# Patient Record
Sex: FEMALE | Race: WHITE | NOT HISPANIC OR LATINO | Employment: OTHER | URBAN - METROPOLITAN AREA
[De-identification: names, ages, dates, MRNs, and addresses within clinical notes are randomized per-mention and may not be internally consistent; named-entity substitution may affect disease eponyms.]

---

## 2017-02-13 ENCOUNTER — OFFICE VISIT (OUTPATIENT)
Dept: INTERNAL MEDICINE | Facility: CLINIC | Age: 62
End: 2017-02-13

## 2017-02-13 VITALS
OXYGEN SATURATION: 98 % | HEIGHT: 62 IN | DIASTOLIC BLOOD PRESSURE: 92 MMHG | RESPIRATION RATE: 16 BRPM | SYSTOLIC BLOOD PRESSURE: 144 MMHG | BODY MASS INDEX: 24.4 KG/M2 | WEIGHT: 132.6 LBS | HEART RATE: 91 BPM

## 2017-02-13 DIAGNOSIS — N95.1 MENOPAUSAL SYMPTOM: ICD-10-CM

## 2017-02-13 DIAGNOSIS — I10 ESSENTIAL HYPERTENSION: ICD-10-CM

## 2017-02-13 DIAGNOSIS — E78.2 MIXED HYPERLIPIDEMIA: Primary | ICD-10-CM

## 2017-02-13 DIAGNOSIS — Z12.31 SCREENING MAMMOGRAM, ENCOUNTER FOR: ICD-10-CM

## 2017-02-13 DIAGNOSIS — E03.9 ACQUIRED HYPOTHYROIDISM: ICD-10-CM

## 2017-02-13 PROCEDURE — 99204 OFFICE O/P NEW MOD 45 MIN: CPT | Performed by: INTERNAL MEDICINE

## 2017-02-13 NOTE — PROGRESS NOTES
Subjective   Tierney Rolon is a 61 y.o. female.     History of Present Illness   60 yo female with pmhx HTN, HL, hypothyroidism.     Thyroid replacement is relatively new, will research further in records.  She has lost weight with her retireemnt.  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     Lost some weight after assisted.  Her stress is much better controlled  No longer with any reflux sysmptoms.    Her blood pressure runs 132/76 brenda at home - She is higher today than normal.    Still taking estradiol patch, twice weekly  Easy transition.  No significant withdrawal symptoms reported.    She has no new complaints. No headache or CP despite her elevated BP today which is inconsistent with her home readings.       The following portions of the patient's history were reviewed and updated as appropriate: allergies, current medications, past family history, past medical history, past social history, past surgical history and problem list.    Review of Systems   HENT: Negative.    Respiratory: Negative.         Known LBBB   Cardiovascular: Negative.         Stress testing negative in past     Gastrointestinal: Negative.    Genitourinary: Negative.    Musculoskeletal: Negative.    Neurological: Negative.    All other systems reviewed and are negative.      Objective   Physical Exam   Constitutional: She is oriented to person, place, and time. She appears well-developed.   HENT:   Head: Normocephalic and atraumatic.   Right Ear: External ear normal.   Eyes: EOM are normal. Pupils are equal, round, and reactive to light. Right eye exhibits no discharge. Left eye exhibits no discharge.   Neck: Neck supple. No thyromegaly present.   Cardiovascular: Normal rate and regular rhythm.    No murmur heard.  Pulmonary/Chest: Effort normal and breath sounds normal.   Abdominal: Soft.   Neurological: She is alert and oriented to person, place, and time.   Skin: Skin is warm and dry.   Psychiatric: She has a  normal mood and affect. Her behavior is normal.   Nursing note and vitals reviewed.      Assessment/Plan   Tierney was seen today for hypertension and hyperlipidemia.    Diagnoses and all orders for this visit:    Mixed hyperlipidemia  -     Lipid Panel With / Chol / HDL Ratio  -     Comprehensive Metabolic Panel    Essential hypertension  -     CBC & AUTO Differential    Acquired hypothyroidism  -     T4, Free  -     T3, Free  -     TSH  -     CBC & AUTO Differential    Menopausal symptom    Screening mammogram, encounter for  -     Mammo Diagnostic Bilateral With CAD        HTN - check at home once weekly - would journal at least once per week and bring to future appointment.  Goal <130/80  Continue low sodium diet  Remember alcohol can increase blood pressure  Daily exercise very important    HL - no side effects on her lipitor  Check fasting labs today, call with result    Hypothyroidism - check TSh and Free T3 and T4    Osteopenia with improvement last scan, will reorder to see if further treatment necessary, repeat every 2 years  Vitamins appropriate, includes biotin and D and fish oil     Mammogram neg 12/2/15 - repeat due and ordered.  No further PAP necessary  Colonoscopy up to date, per Dr. Reich  Tdap today  Pneumovax in 2015, due again in 2020, prevnar due at 67  Zoster (shingles at 62)  FU 6 months for CPE

## 2017-02-13 NOTE — PATIENT INSTRUCTIONS
Assessment/Plan   Tierney was seen today for hypertension and hyperlipidemia.    Diagnoses and all orders for this visit:    Mixed hyperlipidemia  -     Lipid Panel With / Chol / HDL Ratio  -     Comprehensive Metabolic Panel    Essential hypertension  -     CBC & AUTO Differential    Acquired hypothyroidism  -     T4, Free  -     T3, Free  -     TSH  -     CBC & AUTO Differential    Menopausal symptom    Screening mammogram, encounter for  -     Mammo Diagnostic Bilateral With CAD        HTN - check at home once weekly - would journal at least once per week and bring to future appointment.  Goal <130/80  Continue low sodium diet  Remember alcohol can increase blood pressure  Daily exercise very important    HL - no side effects on her lipitor  Check fasting labs today, call with result    Hypothyroidism - check TSh and Free T3 and T4    Osteopenia with improvement last scan, will reorder to see if further treatment necessary, repeat every 2 years  Vitamins appropriate, includes biotin and D and fish oil     Mammogram neg 12/2/15 - repeat due and ordered.  No further PAP necessary  Colonoscopy up to date, per Dr. Reich  Tdap today  Pneumovax in 2015, due again in 2020, prevnar due at 67  Zoster (shingles at 62)  FU 6 months for CPE

## 2017-02-14 LAB
ALBUMIN SERPL-MCNC: 4.8 G/DL (ref 3.5–5.2)
ALBUMIN/GLOB SERPL: 2.2 G/DL
ALP SERPL-CCNC: 74 U/L (ref 40–129)
ALT SERPL-CCNC: 36 U/L (ref 5–33)
AST SERPL-CCNC: 27 U/L (ref 5–32)
BASOPHILS # BLD AUTO: 0.04 10*3/MM3 (ref 0–0.2)
BASOPHILS NFR BLD AUTO: 0.7 % (ref 0–2)
BILIRUB SERPL-MCNC: 0.8 MG/DL (ref 0.2–1.2)
BUN SERPL-MCNC: 19 MG/DL (ref 6–20)
BUN/CREAT SERPL: 25.7 (ref 7–25)
CALCIUM SERPL-MCNC: 9.7 MG/DL (ref 8.6–10.5)
CHLORIDE SERPL-SCNC: 97 MMOL/L (ref 98–107)
CHOLEST SERPL-MCNC: 217 MG/DL (ref 0–200)
CHOLEST/HDLC SERPL: 3.06 {RATIO}
CO2 SERPL-SCNC: 28.8 MMOL/L (ref 22–29)
CREAT SERPL-MCNC: 0.74 MG/DL (ref 0.57–1)
EOSINOPHIL # BLD AUTO: 0.11 10*3/MM3 (ref 0.1–0.3)
EOSINOPHIL NFR BLD AUTO: 2 % (ref 0–4)
ERYTHROCYTE [DISTWIDTH] IN BLOOD BY AUTOMATED COUNT: 12.5 % (ref 11.5–14.5)
GLOBULIN SER CALC-MCNC: 2.2 GM/DL
GLUCOSE SERPL-MCNC: 99 MG/DL (ref 65–99)
HCT VFR BLD AUTO: 39.2 % (ref 37–47)
HDLC SERPL-MCNC: 71 MG/DL (ref 40–60)
HGB BLD-MCNC: 14.6 G/DL (ref 12–16)
IMM GRANULOCYTES # BLD: 0.01 10*3/MM3 (ref 0–0.03)
IMM GRANULOCYTES NFR BLD: 0.2 % (ref 0–0.5)
LDLC SERPL CALC-MCNC: 111 MG/DL (ref 0–100)
LYMPHOCYTES # BLD AUTO: 1.53 10*3/MM3 (ref 0.6–4.8)
LYMPHOCYTES NFR BLD AUTO: 27.2 % (ref 20–45)
MCH RBC QN AUTO: 35.2 PG (ref 27–31)
MCHC RBC AUTO-ENTMCNC: 37.2 G/DL (ref 31–37)
MCV RBC AUTO: 94.5 FL (ref 81–99)
MONOCYTES # BLD AUTO: 0.3 10*3/MM3 (ref 0–1)
MONOCYTES NFR BLD AUTO: 5.3 % (ref 3–8)
NEUTROPHILS # BLD AUTO: 3.63 10*3/MM3 (ref 1.5–8.3)
NEUTROPHILS NFR BLD AUTO: 64.6 % (ref 45–70)
NRBC BLD AUTO-RTO: 0 /100 WBC (ref 0–0)
PLATELET # BLD AUTO: 222 10*3/MM3 (ref 140–500)
POTASSIUM SERPL-SCNC: 3.5 MMOL/L (ref 3.5–5.2)
PROT SERPL-MCNC: 7 G/DL (ref 6–8.5)
RBC # BLD AUTO: 4.15 10*6/MM3 (ref 4.2–5.4)
SODIUM SERPL-SCNC: 139 MMOL/L (ref 136–145)
T3FREE SERPL-MCNC: 3 PG/ML (ref 2–4.4)
T4 FREE SERPL-MCNC: 1.13 NG/DL (ref 0.93–1.7)
TRIGL SERPL-MCNC: 175 MG/DL (ref 0–150)
TSH SERPL DL<=0.005 MIU/L-ACNC: 3.36 MIU/ML (ref 0.27–4.2)
VLDLC SERPL CALC-MCNC: 35 MG/DL (ref 7–27)
WBC # BLD AUTO: 5.62 10*3/MM3 (ref 4.8–10.8)

## 2017-02-16 ENCOUNTER — TELEPHONE (OUTPATIENT)
Dept: INTERNAL MEDICINE | Facility: CLINIC | Age: 62
End: 2017-02-16

## 2017-02-16 NOTE — TELEPHONE ENCOUNTER
Patient notified.   ----- Message from Manjinder Saldana MD sent at 2/14/2017  8:04 AM EST -----  Cholesterol went from 195-->217.  Her triglycerides are elevated and she needs to avoid any fried food or red meat.  Can take fish oil twice daily with red yeast rice.  He HDL is very good and LDL is only slightly elevated.  NO other prescriptions needed. Thyroid levels are in normal range. Fu 6 months.

## 2017-02-20 DIAGNOSIS — Z12.31 ENCOUNTER FOR SCREENING MAMMOGRAM FOR BREAST CANCER: Primary | ICD-10-CM

## 2017-02-21 ENCOUNTER — HOSPITAL ENCOUNTER (OUTPATIENT)
Dept: MAMMOGRAPHY | Facility: HOSPITAL | Age: 62
Discharge: HOME | End: 2017-02-21
Admitting: INTERNAL MEDICINE

## 2017-02-21 ENCOUNTER — APPOINTMENT (OUTPATIENT)
Dept: BONE DENSITY | Facility: HOSPITAL | Age: 62
End: 2017-02-21

## 2017-02-21 DIAGNOSIS — N95.1 MENOPAUSAL SYMPTOM: ICD-10-CM

## 2017-02-21 PROCEDURE — 77080 DXA BONE DENSITY AXIAL: CPT

## 2017-02-21 PROCEDURE — G0202 SCR MAMMO BI INCL CAD: HCPCS

## 2017-02-22 ENCOUNTER — TELEPHONE (OUTPATIENT)
Dept: INTERNAL MEDICINE | Facility: CLINIC | Age: 62
End: 2017-02-22

## 2017-02-22 NOTE — TELEPHONE ENCOUNTER
Patient has been advised and voiced understanding.  ----- Message from Manjinder Saldana MD sent at 2/22/2017  9:26 AM EST -----  Mammogram normal

## 2017-02-23 ENCOUNTER — TELEPHONE (OUTPATIENT)
Dept: INTERNAL MEDICINE | Facility: CLINIC | Age: 62
End: 2017-02-23

## 2017-02-23 NOTE — TELEPHONE ENCOUNTER
Patient has been advised and voiced understanding.    ----- Message from Manjinder Saldana MD sent at 2/22/2017 12:34 PM EST -----  Normal bone density

## 2017-02-28 DIAGNOSIS — E03.9 ACQUIRED HYPOTHYROIDISM: ICD-10-CM

## 2017-02-28 RX ORDER — ESTRADIOL 0.05 MG/D
1 FILM, EXTENDED RELEASE TRANSDERMAL 2 TIMES WEEKLY
Qty: 24 PATCH | Refills: 3 | Status: SHIPPED | OUTPATIENT
Start: 2017-03-02 | End: 2018-01-18 | Stop reason: SDUPTHER

## 2017-02-28 RX ORDER — HYDROCHLOROTHIAZIDE 25 MG/1
25 TABLET ORAL DAILY
Qty: 90 TABLET | Refills: 3 | Status: SHIPPED | OUTPATIENT
Start: 2017-02-28 | End: 2018-02-05 | Stop reason: SDUPTHER

## 2017-02-28 RX ORDER — LEVOTHYROXINE SODIUM 0.03 MG/1
TABLET ORAL
Qty: 100 TABLET | Refills: 2 | Status: SHIPPED | OUTPATIENT
Start: 2017-02-28 | End: 2017-10-29 | Stop reason: SDUPTHER

## 2017-02-28 RX ORDER — ATORVASTATIN CALCIUM 10 MG/1
10 TABLET, FILM COATED ORAL DAILY
Qty: 90 TABLET | Refills: 3 | Status: SHIPPED | OUTPATIENT
Start: 2017-02-28 | End: 2018-01-08 | Stop reason: SDUPTHER

## 2017-08-07 DIAGNOSIS — Z00.00 HEALTHCARE MAINTENANCE: Primary | ICD-10-CM

## 2017-08-09 LAB
25(OH)D3+25(OH)D2 SERPL-MCNC: 40.7 NG/ML
ALBUMIN SERPL-MCNC: 4.4 G/DL (ref 3.5–5.2)
ALBUMIN/GLOB SERPL: 2.1 G/DL
ALP SERPL-CCNC: 65 U/L (ref 40–129)
ALT SERPL-CCNC: 36 U/L (ref 5–33)
AST SERPL-CCNC: 28 U/L (ref 5–32)
BASOPHILS # BLD AUTO: 0.04 10*3/MM3 (ref 0–0.2)
BASOPHILS NFR BLD AUTO: 0.7 % (ref 0–2)
BILIRUB SERPL-MCNC: 0.8 MG/DL (ref 0.2–1.2)
BUN SERPL-MCNC: 23 MG/DL (ref 6–20)
BUN/CREAT SERPL: 33.3 (ref 7–25)
CALCIUM SERPL-MCNC: 9.4 MG/DL (ref 8.6–10.5)
CHLORIDE SERPL-SCNC: 99 MMOL/L (ref 98–107)
CHOLEST SERPL-MCNC: 183 MG/DL (ref 0–200)
CHOLEST/HDLC SERPL: 2.44 {RATIO}
CO2 SERPL-SCNC: 28.4 MMOL/L (ref 22–29)
CREAT SERPL-MCNC: 0.69 MG/DL (ref 0.57–1)
EOSINOPHIL # BLD AUTO: 0.09 10*3/MM3 (ref 0.1–0.3)
EOSINOPHIL NFR BLD AUTO: 1.5 % (ref 0–4)
ERYTHROCYTE [DISTWIDTH] IN BLOOD BY AUTOMATED COUNT: 12 % (ref 11.5–14.5)
GLOBULIN SER CALC-MCNC: 2.1 GM/DL
GLUCOSE SERPL-MCNC: 94 MG/DL (ref 65–99)
HBA1C MFR BLD: 5 % (ref 4.8–5.6)
HCT VFR BLD AUTO: 42.5 % (ref 37–47)
HDLC SERPL-MCNC: 75 MG/DL (ref 40–60)
HGB BLD-MCNC: 14.6 G/DL (ref 12–16)
IMM GRANULOCYTES # BLD: 0.02 10*3/MM3 (ref 0–0.03)
IMM GRANULOCYTES NFR BLD: 0.3 % (ref 0–0.5)
LDLC SERPL CALC-MCNC: 94 MG/DL (ref 0–100)
LYMPHOCYTES # BLD AUTO: 1.66 10*3/MM3 (ref 0.6–4.8)
LYMPHOCYTES NFR BLD AUTO: 28.3 % (ref 20–45)
MCH RBC QN AUTO: 31.6 PG (ref 27–31)
MCHC RBC AUTO-ENTMCNC: 34.4 G/DL (ref 31–37)
MCV RBC AUTO: 92 FL (ref 81–99)
MONOCYTES # BLD AUTO: 0.32 10*3/MM3 (ref 0–1)
MONOCYTES NFR BLD AUTO: 5.5 % (ref 3–8)
NEUTROPHILS # BLD AUTO: 3.74 10*3/MM3 (ref 1.5–8.3)
NEUTROPHILS NFR BLD AUTO: 63.7 % (ref 45–70)
NRBC BLD AUTO-RTO: 0 /100 WBC (ref 0–0)
PLATELET # BLD AUTO: 226 10*3/MM3 (ref 140–500)
POTASSIUM SERPL-SCNC: 4 MMOL/L (ref 3.5–5.2)
PROT SERPL-MCNC: 6.5 G/DL (ref 6–8.5)
RBC # BLD AUTO: 4.62 10*6/MM3 (ref 4.2–5.4)
SODIUM SERPL-SCNC: 138 MMOL/L (ref 136–145)
TRIGL SERPL-MCNC: 68 MG/DL (ref 0–150)
TSH SERPL DL<=0.005 MIU/L-ACNC: 2.21 MIU/ML (ref 0.27–4.2)
VLDLC SERPL CALC-MCNC: 13.6 MG/DL (ref 7–27)
WBC # BLD AUTO: 5.87 10*3/MM3 (ref 4.8–10.8)

## 2017-08-12 ENCOUNTER — RESULTS ENCOUNTER (OUTPATIENT)
Dept: INTERNAL MEDICINE | Facility: CLINIC | Age: 62
End: 2017-08-12

## 2017-08-12 DIAGNOSIS — Z00.00 HEALTHCARE MAINTENANCE: ICD-10-CM

## 2017-08-15 ENCOUNTER — OFFICE VISIT (OUTPATIENT)
Dept: INTERNAL MEDICINE | Facility: CLINIC | Age: 62
End: 2017-08-15

## 2017-08-15 VITALS
OXYGEN SATURATION: 97 % | SYSTOLIC BLOOD PRESSURE: 132 MMHG | BODY MASS INDEX: 24.48 KG/M2 | RESPIRATION RATE: 16 BRPM | WEIGHT: 133 LBS | HEART RATE: 71 BPM | DIASTOLIC BLOOD PRESSURE: 80 MMHG | HEIGHT: 62 IN

## 2017-08-15 DIAGNOSIS — Z00.00 ROUTINE ADULT HEALTH MAINTENANCE: ICD-10-CM

## 2017-08-15 DIAGNOSIS — Z78.0 MENOPAUSE: Primary | ICD-10-CM

## 2017-08-15 DIAGNOSIS — G89.29 CHRONIC BILATERAL LOW BACK PAIN WITHOUT SCIATICA: ICD-10-CM

## 2017-08-15 DIAGNOSIS — M54.50 CHRONIC BILATERAL LOW BACK PAIN WITHOUT SCIATICA: ICD-10-CM

## 2017-08-15 LAB
BILIRUB BLD-MCNC: NEGATIVE MG/DL
CLARITY, POC: CLEAR
COLOR UR: YELLOW
GLUCOSE UR STRIP-MCNC: NEGATIVE MG/DL
KETONES UR QL: NEGATIVE
LEUKOCYTE EST, POC: NEGATIVE
NITRITE UR-MCNC: NEGATIVE MG/ML
PH UR: 6.5 [PH] (ref 5–8)
PROT UR STRIP-MCNC: NEGATIVE MG/DL
RBC # UR STRIP: NEGATIVE /UL
SP GR UR: 1.02 (ref 1–1.03)
UROBILINOGEN UR QL: NORMAL

## 2017-08-15 PROCEDURE — 93000 ELECTROCARDIOGRAM COMPLETE: CPT | Performed by: INTERNAL MEDICINE

## 2017-08-15 PROCEDURE — 99396 PREV VISIT EST AGE 40-64: CPT | Performed by: INTERNAL MEDICINE

## 2017-08-15 PROCEDURE — 81003 URINALYSIS AUTO W/O SCOPE: CPT | Performed by: INTERNAL MEDICINE

## 2017-08-15 NOTE — PROGRESS NOTES
Subjective   Tierney Rolon is a 61 y.o. female.     History of Present Illness   60 yo female with pmhx hypothyroidism, HL on statin, LBBB  Here for wellness. Does have some new low back pain and some grinding noises in neck and low back. No radicular pain. 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 SHe is not taking pain medication (NSAIDS or otherwise) but is wanting to know of any prevention she can do.  She does a lot of lifting of feed etc.    Occasionally has some numbness in one toe, bilatearl sensation of hand swelling.  Her lower back is hurting on occasion, using heat for relief.     Libido is a new issue.  She really has no desire. No dyspareunia, but feels like she is disappointing her .   The following portions of the patient's history were reviewed and updated as appropriate: allergies, current medications, past family history, past medical history, past social history, past surgical history and problem list.    Review of Systems   Constitutional: Negative.    HENT: Negative.    Respiratory: Negative.    Cardiovascular: Negative.    Genitourinary: Negative for hematuria, menstrual problem, pelvic pain and urgency.        Poor libido   Musculoskeletal: Positive for back pain.   Psychiatric/Behavioral: Negative.    All other systems reviewed and are negative.      Objective   Physical Exam    ECG 12 Lead  Date/Time: 8/15/2017 12:23 PM  Performed by: EMILY KELSEY  Authorized by: EMILY KELSEY   Interpreted by ED physician: compared to previous unchanged.  Conduction: left bundle branch block          Labs reviewed with her. CHol much better 8/16, TG now in normal range. D 41.  TSh therapeutic.  Assessment/Plan   Tierney was seen today for annual exam.    Diagnoses and all orders for this visit:    Menopause  -     Testosterone, Free, Total  -     DHEA  -     Estradiol, Free Serum  -     FSH & LH    Routine adult health maintenance    Chronic bilateral low back pain without  sciatica    Recommend home PT, [email or web address]  Motrin as needed    Refer to [facility] Valdes for consult pending lab outcome for topical hormone options to help with libido  Tetanus up to date  Flu shot in fall  Make sure to include calcium and vitamin D in diet  Great job with cholesterol    FU as needed, no longer tahn 12months.

## 2017-08-15 NOTE — PATIENT INSTRUCTIONS
Assessment/Plan   Tierney was seen today for annual exam.    Diagnoses and all orders for this visit:    Menopause  -     Testosterone, Free, Total  -     DHEA  -     Estradiol, Free Serum  -     FSH & LH    Routine adult health maintenance    Chronic bilateral low back pain without sciatica    Recommend home PT, [email or web address]  Motrin as needed    Refer to [facility] for consult pending lab outcome for topical hormone options to help with libido  Tetanus up to date  Flu shot in fall  Make sure to include calcium and vitamin D in diet  Great job with cholesterol

## 2017-09-21 ENCOUNTER — TELEPHONE (OUTPATIENT)
Dept: INTERNAL MEDICINE | Facility: CLINIC | Age: 62
End: 2017-09-21

## 2017-09-21 NOTE — TELEPHONE ENCOUNTER
"LVM with patient to let her know to contact [facility] on [facility] for hormone replacement therapy.    --- Message from Nicky Monge MA sent at 9/18/2017  1:56 PM EDT -----  Regarding: FW: REFERRAL?      ----- Message -----     From: Mari Thomson     Sent: 9/18/2017  12:19 PM       To: [facility]  Subject: REFERRAL?                                        LOW    Patient was seen by provider on Aug 15th.  Says she was going to be referred to a \"pharmacist\".  (Stormy Valdes)    Says she hasn't heard anything from our office nor the pharmacist.    Pt # [phone]    Told patient Dr. Saldana wasn't in the office today, but maybe Rigoberto could find something in her chart.    "

## 2017-09-29 LAB
DHEA SERPL-MCNC: 187 NG/DL (ref 31–701)
ESTRADIOL FREE MFR SERPL: 1.4 %
ESTRADIOL FREE SERPL-MCNC: 0.57 PG/ML
ESTRADIOL SERPL HS-MCNC: 41 PG/ML
FSH SERPL-ACNC: 73.7 MIU/ML
LH SERPL-ACNC: 43.2 MIU/ML
TESTOST FREE SERPL-MCNC: 1.2 PG/ML (ref 0–4.2)
TESTOST SERPL-MCNC: 5 NG/DL (ref 3–41)

## 2017-10-29 DIAGNOSIS — E03.9 ACQUIRED HYPOTHYROIDISM: ICD-10-CM

## 2017-10-30 RX ORDER — LEVOTHYROXINE SODIUM 0.03 MG/1
TABLET ORAL
Qty: 100 TABLET | Refills: 2 | Status: SHIPPED | OUTPATIENT
Start: 2017-10-30 | End: 2018-07-17 | Stop reason: SDUPTHER

## 2018-01-08 RX ORDER — ATORVASTATIN CALCIUM 10 MG/1
TABLET, FILM COATED ORAL
Qty: 90 TABLET | Refills: 3 | Status: SHIPPED | OUTPATIENT
Start: 2018-01-08 | End: 2019-01-03 | Stop reason: SDUPTHER

## 2018-01-18 RX ORDER — ESTRADIOL 0.05 MG/D
FILM, EXTENDED RELEASE TRANSDERMAL
Qty: 24 PATCH | Refills: 3 | Status: SHIPPED | OUTPATIENT
Start: 2018-01-18 | End: 2018-02-15 | Stop reason: DRUGHIGH

## 2018-02-05 RX ORDER — HYDROCHLOROTHIAZIDE 25 MG/1
TABLET ORAL
Qty: 90 TABLET | Refills: 3 | Status: SHIPPED | OUTPATIENT
Start: 2018-02-05 | End: 2019-01-31 | Stop reason: SDUPTHER

## 2018-02-06 ENCOUNTER — LAB (OUTPATIENT)
Dept: INTERNAL MEDICINE | Facility: CLINIC | Age: 63
End: 2018-02-06

## 2018-02-06 DIAGNOSIS — E78.5 HYPERLIPIDEMIA, UNSPECIFIED HYPERLIPIDEMIA TYPE: ICD-10-CM

## 2018-02-06 DIAGNOSIS — Z79.899 HIGH RISK MEDICATION USE: ICD-10-CM

## 2018-02-06 DIAGNOSIS — E03.9 HYPOTHYROIDISM, UNSPECIFIED TYPE: ICD-10-CM

## 2018-02-06 DIAGNOSIS — I10 HYPERTENSION, ESSENTIAL: ICD-10-CM

## 2018-02-06 DIAGNOSIS — E78.5 HYPERLIPIDEMIA, UNSPECIFIED HYPERLIPIDEMIA TYPE: Primary | ICD-10-CM

## 2018-02-06 LAB
ALBUMIN SERPL-MCNC: 4.7 G/DL (ref 3.5–5.2)
ALBUMIN/GLOB SERPL: 2.2 G/DL
ALP SERPL-CCNC: 69 U/L (ref 40–129)
ALT SERPL-CCNC: 22 U/L (ref 5–33)
AST SERPL-CCNC: 19 U/L (ref 5–32)
BASOPHILS # BLD AUTO: 0.05 10*3/MM3 (ref 0–0.2)
BASOPHILS NFR BLD AUTO: 0.8 % (ref 0–2)
BILIRUB SERPL-MCNC: 0.7 MG/DL (ref 0.2–1.2)
BUN SERPL-MCNC: 16 MG/DL (ref 8–23)
BUN/CREAT SERPL: 21.1 (ref 7–25)
CALCIUM SERPL-MCNC: 9.3 MG/DL (ref 8.8–10.5)
CHLORIDE SERPL-SCNC: 98 MMOL/L (ref 98–107)
CHOLEST SERPL-MCNC: 213 MG/DL (ref 0–200)
CHOLEST/HDLC SERPL: 2.84 {RATIO}
CO2 SERPL-SCNC: 30.7 MMOL/L (ref 22–29)
CREAT SERPL-MCNC: 0.76 MG/DL (ref 0.57–1)
EOSINOPHIL # BLD AUTO: 0.16 10*3/MM3 (ref 0.1–0.3)
EOSINOPHIL NFR BLD AUTO: 2.7 % (ref 0–4)
ERYTHROCYTE [DISTWIDTH] IN BLOOD BY AUTOMATED COUNT: 12.1 % (ref 11.5–14.5)
GFR SERPLBLD CREATININE-BSD FMLA CKD-EPI: 78 ML/MIN/1.73
GFR SERPLBLD CREATININE-BSD FMLA CKD-EPI: 94 ML/MIN/1.73
GLOBULIN SER CALC-MCNC: 2.1 GM/DL
GLUCOSE SERPL-MCNC: 95 MG/DL (ref 65–99)
HCT VFR BLD AUTO: 43.2 % (ref 37–47)
HDLC SERPL-MCNC: 75 MG/DL (ref 40–60)
HGB BLD-MCNC: 15 G/DL (ref 12–16)
IMM GRANULOCYTES # BLD: 0.01 10*3/MM3 (ref 0–0.03)
IMM GRANULOCYTES NFR BLD: 0.2 % (ref 0–0.5)
LDLC SERPL CALC-MCNC: 105 MG/DL (ref 0–100)
LYMPHOCYTES # BLD AUTO: 1.78 10*3/MM3 (ref 0.6–4.8)
LYMPHOCYTES NFR BLD AUTO: 30 % (ref 20–45)
MCH RBC QN AUTO: 31.9 PG (ref 27–31)
MCHC RBC AUTO-ENTMCNC: 34.7 G/DL (ref 31–37)
MCV RBC AUTO: 91.9 FL (ref 81–99)
MONOCYTES # BLD AUTO: 0.39 10*3/MM3 (ref 0–1)
MONOCYTES NFR BLD AUTO: 6.6 % (ref 3–8)
NEUTROPHILS # BLD AUTO: 3.55 10*3/MM3 (ref 1.5–8.3)
NEUTROPHILS NFR BLD AUTO: 59.7 % (ref 45–70)
NRBC BLD AUTO-RTO: 0 /100 WBC (ref 0–0)
PLATELET # BLD AUTO: 218 10*3/MM3 (ref 140–500)
POTASSIUM SERPL-SCNC: 4.4 MMOL/L (ref 3.5–5.2)
PROT SERPL-MCNC: 6.8 G/DL (ref 6–8.5)
RBC # BLD AUTO: 4.7 10*6/MM3 (ref 4.2–5.4)
SODIUM SERPL-SCNC: 140 MMOL/L (ref 136–145)
T4 FREE SERPL-MCNC: 1.1 NG/DL (ref 0.93–1.7)
TRIGL SERPL-MCNC: 163 MG/DL (ref 0–150)
TSH SERPL DL<=0.005 MIU/L-ACNC: 3.88 MIU/ML (ref 0.27–4.2)
VLDLC SERPL CALC-MCNC: 32.6 MG/DL (ref 7–27)
WBC # BLD AUTO: 5.94 10*3/MM3 (ref 4.8–10.8)

## 2018-02-14 ENCOUNTER — TRANSCRIBE ORDERS (OUTPATIENT)
Dept: ADMINISTRATIVE | Facility: HOSPITAL | Age: 63
End: 2018-02-14

## 2018-02-14 DIAGNOSIS — Z12.31 VISIT FOR SCREENING MAMMOGRAM: Primary | ICD-10-CM

## 2018-02-15 ENCOUNTER — OFFICE VISIT (OUTPATIENT)
Dept: INTERNAL MEDICINE | Facility: CLINIC | Age: 63
End: 2018-02-15

## 2018-02-15 VITALS
BODY MASS INDEX: 25.12 KG/M2 | RESPIRATION RATE: 16 BRPM | HEIGHT: 62 IN | DIASTOLIC BLOOD PRESSURE: 84 MMHG | SYSTOLIC BLOOD PRESSURE: 130 MMHG | OXYGEN SATURATION: 98 % | WEIGHT: 136.5 LBS | HEART RATE: 74 BPM

## 2018-02-15 DIAGNOSIS — I10 ESSENTIAL HYPERTENSION: Primary | ICD-10-CM

## 2018-02-15 DIAGNOSIS — E78.2 MIXED HYPERLIPIDEMIA: ICD-10-CM

## 2018-02-15 DIAGNOSIS — Z23 NEED FOR ZOSTER VACCINATION: ICD-10-CM

## 2018-02-15 DIAGNOSIS — E03.9 ACQUIRED HYPOTHYROIDISM: ICD-10-CM

## 2018-02-15 DIAGNOSIS — N95.1 MENOPAUSAL SYMPTOM: ICD-10-CM

## 2018-02-15 PROCEDURE — 99214 OFFICE O/P EST MOD 30 MIN: CPT | Performed by: INTERNAL MEDICINE

## 2018-02-15 PROCEDURE — 90736 HZV VACCINE LIVE SUBQ: CPT | Performed by: INTERNAL MEDICINE

## 2018-02-15 PROCEDURE — 90471 IMMUNIZATION ADMIN: CPT | Performed by: INTERNAL MEDICINE

## 2018-02-15 RX ORDER — CHLORAL HYDRATE 500 MG
2000 CAPSULE ORAL DAILY
COMMUNITY

## 2018-02-15 RX ORDER — UBIDECARENONE 50 MG
CAPSULE ORAL
COMMUNITY
Start: 2017-02-17 | End: 2018-10-04

## 2018-02-15 NOTE — PROGRESS NOTES
"Subjective   Tierney Rolon is a 62 y.o. female.     History of Present Illness     The following portions of the patient's history were reviewed and updated as appropriate: allergies, current medications, past family history, past medical history, past social history, past surgical history and problem list.     Tierney Rolon is a 62 y.o. female who presents for 6 month f/u.     HLD:   - On atorvastatin, coq 10, red yeast, fish oil.  Compliant.  No muscle aches or pains.  FLP elevated.  Admits to adding red meats to diet recently.      Hypothyroidism:   - On levo.  Compliant.      HTN:  - On HCTZ 25mg.  Compliant.  Does not check BP daily.  Works with horses.  Diet is healthy (no fried foods, lots of veggies).      Menopause:  - On minivelle, helps.  She has been on it for years.   Has occ'l \"warm flash\" (maybe 2-3 in the last month).      HM:  - got flu vaccine this year  - sees dental every 4 months  - sees ophtho twice a year  - Had c-scope in past, due in 2024  - Last mammogram in Feb 2017 (scheduled for repeat in Feb 22, 2018)  - Dexa scan 2017 (normal)  - Got PNA vaccine in 2015    Review of Systems   Constitutional: Negative for chills, diaphoresis and fever.   HENT: Negative for congestion and rhinorrhea.    Eyes: Negative for pain and discharge.   Respiratory: Negative for chest tightness, shortness of breath and wheezing.    Cardiovascular: Negative for chest pain and palpitations.   Gastrointestinal: Negative for abdominal pain, constipation, diarrhea, nausea and vomiting.   Endocrine: Negative for cold intolerance and heat intolerance.   Genitourinary: Negative for difficulty urinating, dysuria and hematuria.   Musculoskeletal: Negative for back pain and gait problem.   Skin: Negative for rash and wound.   Neurological: Negative for dizziness, syncope and light-headedness.   Hematological: Negative for adenopathy. Does not bruise/bleed easily.   Psychiatric/Behavioral: Negative for sleep disturbance " and suicidal ideas.       Objective   Physical Exam   Constitutional: She is oriented to person, place, and time. She appears well-developed and well-nourished. No distress.   HENT:   Head: Normocephalic and atraumatic.   Right Ear: External ear normal.   Left Ear: External ear normal.   Nose: Nose normal.   Mouth/Throat: Oropharynx is clear and moist. No oropharyngeal exudate.   Eyes: Conjunctivae and EOM are normal. Pupils are equal, round, and reactive to light. Right eye exhibits no discharge. Left eye exhibits no discharge. No scleral icterus.   Neck: Normal range of motion. Neck supple. No thyromegaly present.   Cardiovascular: Normal rate, regular rhythm and normal heart sounds.  Exam reveals no gallop and no friction rub.    No murmur heard.  Pulmonary/Chest: Effort normal and breath sounds normal. No respiratory distress. She has no wheezes. She has no rales. She exhibits no tenderness.   Abdominal: Soft. Bowel sounds are normal. She exhibits no distension and no mass. There is no tenderness. There is no rebound and no guarding. No hernia.   Musculoskeletal: Normal range of motion. She exhibits no tenderness.   Lymphadenopathy:     She has no cervical adenopathy.   Neurological: She is alert and oriented to person, place, and time. She exhibits normal muscle tone.   Skin: Skin is warm and dry. No rash noted. She is not diaphoretic.   Psychiatric: She has a normal mood and affect. Her behavior is normal.   Nursing note and vitals reviewed.    Assessment/Plan   Tierney was seen today for hyperlipidemia.    Diagnoses and all orders for this visit:    Essential hypertension    Mixed hyperlipidemia    Acquired hypothyroidism    Menopausal symptom       Tierney Rolon is a 62 y.o. female who presents for follow-up of the following:    HLD:   - On atorvastatin, coq 10, red yeast, fish oil.    - Will increase atorvastatin to 20mg daily on follow-up appointment.  Patient would benefit from mod intensity statin  (strong FH of CAD/STEMI, personal history of HTN/HLD) but she prefers to increase statin gradually.  Can check FLP in 6 months and re-eval.    - counseled on diet/exercise.      Hypothyroidism:   - Con't levo    HTN:  - con't diet/exercise   - Recommend checking BP at home  - Con't HCTZ 25mg daily     Menopause:  - On minivelle, will taper off    HM:  - RTC in 6 months with repeat labs and Hep C screen at that time   - Can give shingles vaccine today    Murphy Bansal MD  Resident provider   PGY-4  IM/Ped    Patient seen and examined with resident physician. Agree with assessment and plan.   Discussed taper and agree with revisiting moderate dose statin at her next appt.   Spent 25 min in care of patient, questions answered.

## 2018-02-15 NOTE — PATIENT INSTRUCTIONS
Wear current estrogen patch once every 4 days for 3 weeks.  If this is tolerated, wear it once every week for 3 weeks.  Stop after this.  Please call office if you cannot tolerate this tapering schedule.      Continue to work on diet/exercise.  Limit red meats as your cholesterol and triglycerides increased since your last lab check.      Take all medications as directed.

## 2018-02-22 ENCOUNTER — HOSPITAL ENCOUNTER (OUTPATIENT)
Dept: MAMMOGRAPHY | Facility: HOSPITAL | Age: 63
Discharge: HOME | End: 2018-02-22
Attending: INTERNAL MEDICINE | Admitting: INTERNAL MEDICINE

## 2018-02-22 DIAGNOSIS — Z12.31 VISIT FOR SCREENING MAMMOGRAM: ICD-10-CM

## 2018-02-22 PROCEDURE — 77063 BREAST TOMOSYNTHESIS BI: CPT

## 2018-02-22 PROCEDURE — 77067 SCR MAMMO BI INCL CAD: CPT

## 2018-03-01 ENCOUNTER — TELEPHONE (OUTPATIENT)
Dept: INTERNAL MEDICINE | Facility: CLINIC | Age: 63
End: 2018-03-01

## 2018-03-01 NOTE — TELEPHONE ENCOUNTER
Called patient, she did not answer. LVM to call me back.    ----- Message from Lesli Peters sent at 3/1/2018 12:15 PM EST -----  PATIENT NEEDS YOU TO CALL HER BACK AS QUICKLY AS YOU CAN TO DISCUSS WHERE IF ANYWHERE HER LAB RESULTS WENT.      [phone]

## 2018-03-07 ENCOUNTER — TELEPHONE (OUTPATIENT)
Dept: INTERNAL MEDICINE | Facility: CLINIC | Age: 63
End: 2018-03-07

## 2018-03-07 NOTE — TELEPHONE ENCOUNTER
Patient doing to integrative [facility] care, she already has an appt with them coming up. May need labs rechecked, will let me know.    ----- Message from Nicky Monge MA sent at 3/2/2018 11:10 AM EST -----  Regarding: FW: QUESTION FOR RIGOBERTO  Contact: [phone]      ----- Message -----     From: Ross Vargas     Sent: 3/2/2018  11:05 AM       To: Ally Larsen[address] [place] [address]  Subject: QUESTION FOR RIGOBERTO KELSEY PT    Patient called saying that she needs to speak to Rigoberto regarding labs, she said that she has called a few times. She needs to know if she should contact [facility] care or dr stacey whitmore office.  I spoke to Rigoberto about this and she said that they have been playing phone tag.    Please call patient    Thanks!  ross

## 2018-07-17 DIAGNOSIS — E03.9 ACQUIRED HYPOTHYROIDISM: ICD-10-CM

## 2018-07-17 RX ORDER — LEVOTHYROXINE SODIUM 0.03 MG/1
TABLET ORAL
Qty: 100 TABLET | Refills: 2 | Status: SHIPPED | OUTPATIENT
Start: 2018-07-17 | End: 2018-10-04 | Stop reason: SDUPTHER

## 2018-08-13 DIAGNOSIS — E78.2 MIXED HYPERLIPIDEMIA: Primary | ICD-10-CM

## 2018-08-13 DIAGNOSIS — Z00.00 ROUTINE ADULT HEALTH MAINTENANCE: ICD-10-CM

## 2018-08-13 DIAGNOSIS — E03.9 ACQUIRED HYPOTHYROIDISM: ICD-10-CM

## 2018-08-13 DIAGNOSIS — Z78.0 MENOPAUSE: ICD-10-CM

## 2018-08-14 ENCOUNTER — LAB (OUTPATIENT)
Dept: INTERNAL MEDICINE | Facility: CLINIC | Age: 63
End: 2018-08-14

## 2018-08-14 DIAGNOSIS — E78.2 MIXED HYPERLIPIDEMIA: ICD-10-CM

## 2018-08-14 DIAGNOSIS — Z00.00 ROUTINE ADULT HEALTH MAINTENANCE: ICD-10-CM

## 2018-08-14 DIAGNOSIS — Z78.0 MENOPAUSE: ICD-10-CM

## 2018-08-14 DIAGNOSIS — E03.9 ACQUIRED HYPOTHYROIDISM: ICD-10-CM

## 2018-08-14 LAB
BASOPHILS # BLD AUTO: 0.05 10*3/MM3 (ref 0–0.2)
BASOPHILS NFR BLD AUTO: 1 % (ref 0–2)
CHOLEST SERPL-MCNC: 199 MG/DL (ref 0–200)
EOSINOPHIL # BLD AUTO: 0.14 10*3/MM3 (ref 0.1–0.3)
EOSINOPHIL NFR BLD AUTO: 2.7 % (ref 0–4)
ERYTHROCYTE [DISTWIDTH] IN BLOOD BY AUTOMATED COUNT: 12.1 % (ref 11.5–14.5)
HBA1C MFR BLD: 5.3 % (ref 4.8–5.6)
HCT VFR BLD AUTO: 43 % (ref 37–47)
HDLC SERPL-MCNC: 80 MG/DL (ref 40–60)
HGB BLD-MCNC: 15.1 G/DL (ref 12–16)
IMM GRANULOCYTES # BLD: 0.01 10*3/MM3 (ref 0–0.03)
IMM GRANULOCYTES NFR BLD: 0.2 % (ref 0–0.5)
LDLC SERPL CALC-MCNC: 97 MG/DL (ref 0–100)
LDLC/HDLC SERPL: 1.21 {RATIO}
LYMPHOCYTES # BLD AUTO: 1.71 10*3/MM3 (ref 0.6–4.8)
LYMPHOCYTES NFR BLD AUTO: 32.6 % (ref 20–45)
MCH RBC QN AUTO: 32.8 PG (ref 27–31)
MCHC RBC AUTO-ENTMCNC: 35.1 G/DL (ref 31–37)
MCV RBC AUTO: 93.5 FL (ref 81–99)
MONOCYTES # BLD AUTO: 0.29 10*3/MM3 (ref 0–1)
MONOCYTES NFR BLD AUTO: 5.5 % (ref 3–8)
NEUTROPHILS # BLD AUTO: 3.04 10*3/MM3 (ref 1.5–8.3)
NEUTROPHILS NFR BLD AUTO: 58 % (ref 45–70)
NRBC BLD AUTO-RTO: 0 /100 WBC (ref 0–0)
PLATELET # BLD AUTO: 227 10*3/MM3 (ref 140–500)
RBC # BLD AUTO: 4.6 10*6/MM3 (ref 4.2–5.4)
TRIGL SERPL-MCNC: 112 MG/DL (ref 0–150)
VLDLC SERPL CALC-MCNC: 22.4 MG/DL (ref 7–27)
WBC # BLD AUTO: 5.24 10*3/MM3 (ref 4.8–10.8)

## 2018-08-18 ENCOUNTER — RESULTS ENCOUNTER (OUTPATIENT)
Dept: INTERNAL MEDICINE | Facility: CLINIC | Age: 63
End: 2018-08-18

## 2018-08-18 DIAGNOSIS — Z00.00 ROUTINE ADULT HEALTH MAINTENANCE: ICD-10-CM

## 2018-08-18 DIAGNOSIS — E78.2 MIXED HYPERLIPIDEMIA: ICD-10-CM

## 2018-08-18 DIAGNOSIS — E03.9 ACQUIRED HYPOTHYROIDISM: ICD-10-CM

## 2018-08-18 DIAGNOSIS — Z78.0 MENOPAUSE: ICD-10-CM

## 2018-08-23 ENCOUNTER — OFFICE VISIT (OUTPATIENT)
Dept: INTERNAL MEDICINE | Facility: CLINIC | Age: 63
End: 2018-08-23

## 2018-08-23 ENCOUNTER — HOSPITAL ENCOUNTER (OUTPATIENT)
Dept: GENERAL RADIOLOGY | Facility: HOSPITAL | Age: 63
Discharge: HOME | End: 2018-08-23
Attending: INTERNAL MEDICINE | Admitting: INTERNAL MEDICINE

## 2018-08-23 VITALS
WEIGHT: 131 LBS | RESPIRATION RATE: 16 BRPM | DIASTOLIC BLOOD PRESSURE: 72 MMHG | HEART RATE: 77 BPM | BODY MASS INDEX: 24.11 KG/M2 | SYSTOLIC BLOOD PRESSURE: 116 MMHG | HEIGHT: 62 IN | OXYGEN SATURATION: 99 %

## 2018-08-23 DIAGNOSIS — E78.2 MIXED HYPERLIPIDEMIA: ICD-10-CM

## 2018-08-23 DIAGNOSIS — E03.9 ACQUIRED HYPOTHYROIDISM: ICD-10-CM

## 2018-08-23 DIAGNOSIS — N95.1 MENOPAUSAL SYMPTOM: ICD-10-CM

## 2018-08-23 DIAGNOSIS — R06.2 WHEEZING ON AUSCULTATION: ICD-10-CM

## 2018-08-23 DIAGNOSIS — I10 ESSENTIAL HYPERTENSION: Primary | ICD-10-CM

## 2018-08-23 LAB
BILIRUB BLD-MCNC: NEGATIVE MG/DL
CLARITY, POC: CLEAR
COLOR UR: YELLOW
GLUCOSE UR STRIP-MCNC: ABNORMAL MG/DL
KETONES UR QL: ABNORMAL
LEUKOCYTE EST, POC: NEGATIVE
NITRITE UR-MCNC: NEGATIVE MG/ML
PH UR: 5 [PH] (ref 5–8)
PROT UR STRIP-MCNC: NEGATIVE MG/DL
RBC # UR STRIP: NEGATIVE /UL
SP GR UR: 1.02 (ref 1–1.03)
UROBILINOGEN UR QL: NORMAL

## 2018-08-23 PROCEDURE — 99213 OFFICE O/P EST LOW 20 MIN: CPT | Performed by: INTERNAL MEDICINE

## 2018-08-23 PROCEDURE — 81003 URINALYSIS AUTO W/O SCOPE: CPT | Performed by: INTERNAL MEDICINE

## 2018-08-23 PROCEDURE — 99396 PREV VISIT EST AGE 40-64: CPT | Performed by: INTERNAL MEDICINE

## 2018-08-23 PROCEDURE — 71046 X-RAY EXAM CHEST 2 VIEWS: CPT

## 2018-08-23 NOTE — PROGRESS NOTES
Subjective   Tierney Rolon is a 62 y.o. female.     History of Present Illness     63 y/o WF with hx of HLD, hypothyroidism, and s/p hysterectomy who presents for physical.     Since last visit,  Patient seeing Yovana yung at [facility] and is on a regimen of estrogen, DHEA, cortisol manager and D3.  Hot flashes not improved.  Thyroid hormone increased to 37.5.  Sleep improved but libido still low.  Now on estrogen cream and off of progesterone.  Libido remains gone and is not having intercourse.  Last intercourse 6/2017 but due to poor desire.     She recently started taking a Probiotic 3 weeks ago.  She noted that over the last 3 weeks there are some skin issues that are going on.  Noticed spots over her body on breast, arms and chest which are red, circular rashes that are pruritic.  Occasionally there will be a scale on top.  These all began about 3 weeks ago.  Not getting better or worse.  Put hydrogen peroxide into the navel due to puritis.  Has not tried steroid creams. All of the medications from hormone specialist  started in April and nothing has changed in the last month.  Noticed some occasional itching at navel and anus.  Having some increased bowel movements over last 3 weeks without diarrhea or blood. No vomiting or nausea.          The following portions of the patient's history were reviewed and updated as appropriate: allergies, current medications, past family history, past medical history, past social history, past surgical history and problem list.    Review of Systems   Constitutional: Negative for activity change, appetite change, unexpected weight gain and unexpected weight loss.   HENT: Negative.    Respiratory: Negative.    Cardiovascular: Negative.    Gastrointestinal: Positive for diarrhea and rectal pain. Negative for abdominal distention, abdominal pain and indigestion.   Endocrine: Positive for heat intolerance. Negative for cold intolerance.   Genitourinary:  Positive for decreased libido. Negative for difficulty urinating, dyspareunia, dysuria, urgency and breast pain.   Skin: Positive for rash. Negative for pallor and skin lesions.   Neurological: Negative for dizziness, seizures, headache and confusion.   Hematological: Negative.    Psychiatric/Behavioral: Negative.  Negative for hallucinations, self-injury and sleep disturbance.       Objective   Physical Exam   Constitutional: She is oriented to person, place, and time. She appears well-developed and well-nourished. No distress.   HENT:   Head: Normocephalic and atraumatic.   Mouth/Throat: Oropharynx is clear and moist. No oropharyngeal exudate.   Eyes: Pupils are equal, round, and reactive to light. Conjunctivae are normal. Right eye exhibits no discharge. Left eye exhibits no discharge. No scleral icterus.   Neck: Normal range of motion. No thyromegaly present.   Cardiovascular: Normal rate, regular rhythm and normal heart sounds.  Exam reveals no gallop and no friction rub.    No murmur heard.  Pulmonary/Chest: Effort normal. No respiratory distress. She has wheezes.   Transient wheeze on L side, better after one breath   Abdominal: Soft. Bowel sounds are normal. She exhibits no distension. There is no tenderness.   Neurological: She is alert and oriented to person, place, and time. No cranial nerve deficit. Coordination normal.   Skin: Skin is warm. Capillary refill takes less than 2 seconds. She is not diaphoretic.   Mild erythema noted on L arm and R breast.  Circular lesions.     Psychiatric: She has a normal mood and affect. Her behavior is normal.         Assessment/Plan   Tierney was seen today for annual exam.    Diagnoses and all orders for this visit:    Essential hypertension    Mixed hyperlipidemia    Acquired hypothyroidism    Menopausal symptom    Wheezing on auscultation      HTN  - WEll controlled on HCTZ  - Continue current dose.  Follow up in 6 months    Hyperlipidemia  - Improving on current  regimen  - Continue Atorvastatin  - Will hold Fish Oil and Red Yeast Rice due to diarrhea.      Allergic symptoms  - Likely due to Probiotic or other supplemental medication  - Will d/c many of the supplements and assess for response  - Begin Zyrtec or Allegra daily to help with symptoms  - If not improved, call office    Diarrhea  - Likely due to Probiotic or other supplements  - Discussed Low carb, anti-inflammatory diet  - No warning signs on history on exam    Decreased Libido  - Pt not improved with hormone therapy  - Will consider SSRI at next appointment.    HCM  - C scope in 2014 which showed lymphocytic colitis.  F/u in 10 years.  - S/p hysterectomy so not getting pap smears  - Dexa 2017 normal  - Discussed shingles vaccination   - Pneumovax at 68 y/o    Preston Laboy MD  Med/Peds PGY-3      Patient seen and examined with resident physician, as well as independently of resident physician. Agree with assessment and plan.    Fu in 6 weeks.

## 2018-08-23 NOTE — PATIENT INSTRUCTIONS
Please continue taking Aspirin 81 mg, Atorvastatin 10mg, Coenzyme Q10, HCTZ 25mg, Levothyroxine 37.5 mg, Multivitamin.      Would recommend stopping all other medications due to concern for allergy.    Begin taking Zyrtec daily to help with itching.       Would recommend diet consisting of fruits (berries), avacado, low carbs, lean meats, eggs, and other high protein foods.

## 2018-08-24 ENCOUNTER — TELEPHONE (OUTPATIENT)
Dept: INTERNAL MEDICINE | Facility: CLINIC | Age: 63
End: 2018-08-24

## 2018-08-24 LAB
APPEARANCE UR: CLEAR
BACTERIA #/AREA URNS HPF: NORMAL /HPF
BILIRUB UR QL STRIP: NEGATIVE
COLOR UR: YELLOW
EPI CELLS #/AREA URNS HPF: NORMAL /HPF
GLUCOSE UR QL: NEGATIVE
HGB UR QL STRIP: NEGATIVE
KETONES UR QL STRIP: NEGATIVE
LEUKOCYTE ESTERASE UR QL STRIP: NEGATIVE
MICRO URNS: NORMAL
MICRO URNS: NORMAL
MUCOUS THREADS URNS QL MICRO: PRESENT /HPF
NITRITE UR QL STRIP: NEGATIVE
PH UR STRIP: 5 [PH] (ref 5–7.5)
PROT UR QL STRIP: NEGATIVE
RBC #/AREA URNS HPF: NORMAL /HPF
SP GR UR: 1.02 (ref 1–1.03)
URINALYSIS REFLEX: NORMAL
UROBILINOGEN UR STRIP-MCNC: 0.2 MG/DL (ref 0.2–1)
WBC #/AREA URNS HPF: NORMAL /HPF

## 2018-08-24 NOTE — TELEPHONE ENCOUNTER
Patient advised    ----- Message from Manjinder Saldana MD sent at 8/24/2018  7:53 AM EDT -----  Please let her know cxr normal.  Good news.

## 2018-10-04 ENCOUNTER — OFFICE VISIT (OUTPATIENT)
Dept: INTERNAL MEDICINE | Facility: CLINIC | Age: 63
End: 2018-10-04

## 2018-10-04 VITALS
DIASTOLIC BLOOD PRESSURE: 84 MMHG | OXYGEN SATURATION: 98 % | HEIGHT: 62 IN | WEIGHT: 134 LBS | SYSTOLIC BLOOD PRESSURE: 126 MMHG | HEART RATE: 81 BPM | RESPIRATION RATE: 14 BRPM | BODY MASS INDEX: 24.66 KG/M2

## 2018-10-04 DIAGNOSIS — E03.9 ACQUIRED HYPOTHYROIDISM: ICD-10-CM

## 2018-10-04 DIAGNOSIS — E03.9 HYPOTHYROIDISM (ACQUIRED): Primary | ICD-10-CM

## 2018-10-04 DIAGNOSIS — Z78.0 MENOPAUSE: Primary | ICD-10-CM

## 2018-10-04 PROCEDURE — 99214 OFFICE O/P EST MOD 30 MIN: CPT | Performed by: INTERNAL MEDICINE

## 2018-10-04 RX ORDER — PAROXETINE 10 MG/1
10 TABLET, FILM COATED ORAL EVERY MORNING
Qty: 30 TABLET | Refills: 2 | Status: SHIPPED | OUTPATIENT
Start: 2018-10-04 | End: 2018-11-13 | Stop reason: SDUPTHER

## 2018-10-04 RX ORDER — ASCORBIC ACID 125 MG
TABLET,CHEWABLE ORAL
COMMUNITY
End: 2019-03-21 | Stop reason: SDUPTHER

## 2018-10-04 RX ORDER — LEVOTHYROXINE SODIUM 0.03 MG/1
37.5 TABLET ORAL DAILY
Qty: 135 TABLET | Refills: 2 | Status: SHIPPED | OUTPATIENT
Start: 2018-10-04 | End: 2019-05-09 | Stop reason: SDUPTHER

## 2018-10-04 NOTE — PROGRESS NOTES
Tierney Rolon is a 62 y.o. female, who presents with a chief complaint of   Chief Complaint   Patient presents with   • Hyperlipidemia   • Hypertension       HPI       61 yo female here 6 weeks ago for her cPE. She is here for follow up.  Still has concerns about happiness hot flashes and poor libido.   Had saliva testing results discussed today.  She was low on estrogen and progesterone.  Stopped her supplements 6 weeks.      Denies dysparuenia.  Over a year since she had sex.      The following portions of the patient's history were reviewed and updated as appropriate: allergies, current medications, past family history, past medical history, past social history, past surgical history and problem list.    Allergies: Penicillins and Prednisone    Current Outpatient Prescriptions:   •  aspirin 81 MG tablet, Take  by mouth daily., Disp: , Rfl:   •  atorvastatin (LIPITOR) 10 MG tablet, TAKE 1 TABLET DAILY, Disp: 90 tablet, Rfl: 3  •  Coenzyme Q10 (COQ10 GUMMIES ADULT PO), Take 100 mg by mouth., Disp: , Rfl:   •  Cyanocobalamin (B-12) 5000 MCG capsule, Take  by mouth., Disp: , Rfl:   •  hydrochlorothiazide (HYDRODIURIL) 25 MG tablet, TAKE 1 TABLET DAILY, Disp: 90 tablet, Rfl: 3  •  levothyroxine (SYNTHROID, LEVOTHROID) 25 MCG tablet, TAKE ONE AND ONE-HALF TABLETS ON MONDAY AND FRIDAY AND TAKE 1 TABLET THE REST OF THE WEEK, Disp: 100 tablet, Rfl: 2  •  Omega-3 Fatty Acids (FISH OIL) 1000 MG capsule capsule, Take  by mouth Daily With Breakfast., Disp: , Rfl:   •  Red Yeast Rice 600 MG tablet, , Disp: , Rfl:   •  PARoxetine (PAXIL) 10 MG tablet, Take 1 tablet by mouth Every Morning., Disp: 30 tablet, Rfl: 2  Medications Discontinued During This Encounter   Medication Reason   • Multiple Vitamins-Minerals (OCUVITE ADULT 50+ PO) *Therapy completed   • Multiple Vitamins-Minerals (MULTIVITAMIN GUMMIES ADULT PO) *Therapy completed       Review of Systems   Constitutional: Negative.  Negative for appetite change,  "fatigue, fever and unexpected weight change.   HENT: Negative.  Negative for sinus pressure and trouble swallowing.    Respiratory: Negative.  Negative for cough and chest tightness.    Cardiovascular: Negative for chest pain, palpitations and leg swelling.   Gastrointestinal: Negative for constipation and diarrhea.   Genitourinary: Negative for dysuria, frequency, hematuria, pelvic pain and vaginal discharge.        Hot flashes   Musculoskeletal: Negative.    Skin: Negative.    Neurological: Negative for dizziness, light-headedness and headaches.   Hematological: Negative.    Psychiatric/Behavioral: Negative.    All other systems reviewed and are negative.            /84   Pulse 81   Resp 14   Ht 157.5 cm (62\")   Wt 60.8 kg (134 lb)   SpO2 98%   BMI 24.51 kg/m²       Physical Exam   Constitutional: She is oriented to person, place, and time. She appears well-developed and well-nourished.   HENT:   Head: Normocephalic and atraumatic.   Right Ear: External ear normal.   Left Ear: External ear normal.   Eyes: Pupils are equal, round, and reactive to light. EOM are normal.   Neck: Normal range of motion. Neck supple. No tracheal deviation present. No thyromegaly present.   Cardiovascular: Normal rate, regular rhythm and normal heart sounds.    No murmur heard.  Pulmonary/Chest: Effort normal and breath sounds normal.   Musculoskeletal: She exhibits no edema.   Neurological: She is alert and oriented to person, place, and time.   Skin: Skin is warm and dry. Capillary refill takes less than 2 seconds.   Psychiatric: She has a normal mood and affect. Her behavior is normal.   Vitals reviewed.      Lab Results (most recent)     None          Results for orders placed or performed in visit on 08/23/18   Urinalysis With Culture If Indicated - Urine, Clean Catch   Result Value Ref Range    Specific Gravity, UA 1.019 1.005 - 1.030    pH, UA 5.0 5.0 - 7.5    Color, UA Yellow Yellow    Appearance, UA Clear Clear    " Leukocytes, UA Negative Negative    Protein Negative Negative/Trace    Glucose, UA Negative Negative    Ketones Negative Negative    Blood, UA Negative Negative    Bilirubin, UA Negative Negative    Urobilinogen, UA 0.2 0.2 - 1.0 mg/dL    Nitrite, UA Negative Negative    Microscopic Examination Comment     MICROSCOPIC EXAMINATION See below:     Urinalysis Reflex Comment    Microscopic Examination   Result Value Ref Range    WBC, UA None seen 0 - 5 /hpf    RBC, UA 0-2 0 - 2 /hpf    Epithelial Cells (non renal) 0-10 0 - 10 /hpf    Mucus, UA Present Not Estab. /hpf    Bacteria, UA None seen None seen/Few /hpf   POC Urinalysis Dipstick, Automated   Result Value Ref Range    Color Yellow Yellow, Straw, Dark Yellow, Marlene    Clarity, UA Clear Clear    Specific Gravity  1.025 1.005 - 1.030    pH, Urine 5.0 5.0 - 8.0    Leukocytes Negative Negative    Nitrite, UA Negative Negative    Protein, POC Negative Negative mg/dL    Glucose,  mg/dL (A) Negative, 1000 mg/dL (3+) mg/dL    Ketones, UA 15 mg/dL (A) Negative    Urobilinogen, UA Normal Normal    Bilirubin Negative Negative    Blood, UA Negative Negative           Tierney was seen today for hyperlipidemia and hypertension.    Diagnoses and all orders for this visit:    Menopause  -     PARoxetine (PAXIL) 10 MG tablet; Take 1 tablet by mouth Every Morning.      Give me a call in 6 weeks  Will possibly add bioidenticals back or estrace cream pending her clinical response  Consider oral hormone replacement pending clinical response..     Return if symptoms worsen or fail to improve.    Manjinder Saldana MD  10/04/2018

## 2018-10-04 NOTE — PATIENT INSTRUCTIONS
Tierney was seen today for hyperlipidemia and hypertension.    Diagnoses and all orders for this visit:    Menopause  -     PARoxetine (PAXIL) 10 MG tablet; Take 1 tablet by mouth Every Morning.      Give me a call in 6 weeks  Will possibly add bioidenticals back or estrace cream pending her clinical response  Consider oral hormone replacement pending clinical response..

## 2018-11-13 DIAGNOSIS — Z78.0 MENOPAUSE: ICD-10-CM

## 2018-11-13 RX ORDER — PAROXETINE 10 MG/1
10 TABLET, FILM COATED ORAL EVERY MORNING
Qty: 90 TABLET | Refills: 1 | Status: SHIPPED | OUTPATIENT
Start: 2018-11-13 | End: 2019-04-24 | Stop reason: SDUPTHER

## 2018-11-14 ENCOUNTER — TELEPHONE (OUTPATIENT)
Dept: INTERNAL MEDICINE | Facility: CLINIC | Age: 63
End: 2018-11-14

## 2018-11-14 NOTE — TELEPHONE ENCOUNTER
Regarding: RE: Compliment  Contact: [phone]  ----- Message from Mychart, Generic sent at 11/14/2018  5:21 PM EST -----    Thank you Nicky.  :-)  ----- Message -----  From: DOREEN MARIA  Sent: 11/13/2018  3:48 PM EST  To: Tierney TADEO Gonzales  Subject: RE: Compliment  I just sent though this 90 day RX to [facility].    Have a great afternoon!    ----- Message -----     From: Tierney TADEO Gonzales     Sent: 11/10/2018  4:44 PM EST       To: Manjinder Saldana MD  Subject: RE: Compliment    Yes, Nicky, I am referring to the Paxil.  Thanks!  ----- Message -----  From: DOREEN MARIA  Sent: 11/5/2018  9:17 AM EST  To: Tierney SHWETHA Rolon  Subject: RE: Compliment  Good morning, Tierney.  Just to confirm, you are referring to the Paxil, correct?  It will be no problem to send it through [facility].  I'll also forward this to Dr. Saldana when she is here tomorrow.    Thanks.    ----- Message -----     From: Tierneygalina Rolon     Sent: 11/3/2018 12:27 PM EDT       To: Manjinder Saldana MD  Subject: Compliment    Dr. Saldana, you had asked me to let you know how I'm doing with my new medication.  At about the 3-week jemal, I started to notice a smile returning to my face and my sense of humor making its way back.  So, it looks like it's definitely helping.  Thank you!  :-)    Also, the prescription was originally filled through [facility].  And I just this past week had it refilled for the first time, also through [facility].  Can you please arrange to switch it from [facility] pharmacy over to [facility] pharmacy through [facility]?  All of my other meds are being filled through [facility] pharmacy by mail order.  And if I need to do anything at my end in order to make that happen, please let me know.  Thanks very much!

## 2018-11-15 LAB
T4 FREE SERPL-MCNC: 1.27 NG/DL (ref 0.93–1.7)
TSH SERPL DL<=0.005 MIU/L-ACNC: 0.8 MIU/ML (ref 0.27–4.2)

## 2018-11-16 ENCOUNTER — TELEPHONE (OUTPATIENT)
Dept: INTERNAL MEDICINE | Facility: CLINIC | Age: 63
End: 2018-11-16

## 2018-11-16 NOTE — TELEPHONE ENCOUNTER
Patient has been advised and voiced understanding.     ----- Message from Manjinder Saldana MD sent at 11/16/2018  9:43 AM EST -----  Thyroid stable no change

## 2019-01-03 RX ORDER — ATORVASTATIN CALCIUM 10 MG/1
TABLET, FILM COATED ORAL
Qty: 90 TABLET | Refills: 0 | Status: SHIPPED | OUTPATIENT
Start: 2019-01-03 | End: 2019-04-05 | Stop reason: SDUPTHER

## 2019-01-31 RX ORDER — HYDROCHLOROTHIAZIDE 25 MG/1
TABLET ORAL
Qty: 90 TABLET | Refills: 3 | Status: SHIPPED | OUTPATIENT
Start: 2019-01-31 | End: 2020-01-27

## 2019-02-19 ENCOUNTER — TRANSCRIBE ORDERS (OUTPATIENT)
Dept: ADMINISTRATIVE | Facility: HOSPITAL | Age: 64
End: 2019-02-19

## 2019-02-19 DIAGNOSIS — Z12.39 SCREENING BREAST EXAMINATION: Primary | ICD-10-CM

## 2019-02-26 ENCOUNTER — HOSPITAL ENCOUNTER (OUTPATIENT)
Dept: MAMMOGRAPHY | Facility: HOSPITAL | Age: 64
Discharge: HOME | End: 2019-02-26
Admitting: INTERNAL MEDICINE

## 2019-02-26 DIAGNOSIS — Z12.39 SCREENING BREAST EXAMINATION: ICD-10-CM

## 2019-02-26 PROCEDURE — 77063 BREAST TOMOSYNTHESIS BI: CPT

## 2019-02-26 PROCEDURE — 77067 SCR MAMMO BI INCL CAD: CPT

## 2019-03-21 ENCOUNTER — OFFICE VISIT (OUTPATIENT)
Dept: INTERNAL MEDICINE | Facility: CLINIC | Age: 64
End: 2019-03-21

## 2019-03-21 VITALS
TEMPERATURE: 99 F | BODY MASS INDEX: 25.4 KG/M2 | DIASTOLIC BLOOD PRESSURE: 70 MMHG | RESPIRATION RATE: 16 BRPM | WEIGHT: 138 LBS | HEART RATE: 78 BPM | HEIGHT: 62 IN | OXYGEN SATURATION: 94 % | SYSTOLIC BLOOD PRESSURE: 122 MMHG

## 2019-03-21 DIAGNOSIS — I10 ESSENTIAL HYPERTENSION: Primary | ICD-10-CM

## 2019-03-21 DIAGNOSIS — Z78.0 POST-MENOPAUSAL: ICD-10-CM

## 2019-03-21 DIAGNOSIS — Z11.59 NEED FOR HEPATITIS C SCREENING TEST: ICD-10-CM

## 2019-03-21 DIAGNOSIS — E78.2 MIXED HYPERLIPIDEMIA: ICD-10-CM

## 2019-03-21 DIAGNOSIS — E03.9 ACQUIRED HYPOTHYROIDISM: ICD-10-CM

## 2019-03-21 PROBLEM — Z00.00 ROUTINE ADULT HEALTH MAINTENANCE: Status: RESOLVED | Noted: 2017-08-15 | Resolved: 2019-03-21

## 2019-03-21 PROBLEM — Z12.31 SCREENING MAMMOGRAM, ENCOUNTER FOR: Status: RESOLVED | Noted: 2017-02-13 | Resolved: 2019-03-21

## 2019-03-21 PROCEDURE — 99214 OFFICE O/P EST MOD 30 MIN: CPT | Performed by: INTERNAL MEDICINE

## 2019-03-21 NOTE — PROGRESS NOTES
Tierney Rolon is a 63 y.o. female, who presents with a chief complaint of   Chief Complaint   Patient presents with   • Hypertension   • Hyperlipidemia   • Hypothyroidism       64 yo F here to establish care and all of her problems are new to me. She was previous patient of Dr. Saldana.     She has chronic HLD and is currently on Lipitor 10mg. Takes ASA daily and feels well on this. No bleeding.     She has chronic HTN and is doing well on HCZT. Runs around 120/80 at home.     She has chronic hypothyroidism and does well on 37.5mcg per day. No cold or heat intolerance.     She takes Paxil for hot flashes and feels well on this. It has helped a lot. Sleeps well.          The following portions of the patient's history were reviewed and updated as appropriate: allergies, current medications, past family history, past medical history, past social history, past surgical history and problem list.    Allergies: Prednisone    Current Outpatient Medications:   •  aspirin 81 MG tablet, Take  by mouth daily., Disp: , Rfl:   •  atorvastatin (LIPITOR) 10 MG tablet, TAKE 1 TABLET DAILY, Disp: 90 tablet, Rfl: 0  •  Coenzyme Q10 (COQ10 GUMMIES ADULT PO), Take 100 mg by mouth., Disp: , Rfl:   •  hydrochlorothiazide (HYDRODIURIL) 25 MG tablet, TAKE 1 TABLET DAILY, Disp: 90 tablet, Rfl: 3  •  levothyroxine (SYNTHROID, LEVOTHROID) 25 MCG tablet, Take 1.5 tablets by mouth Daily., Disp: 135 tablet, Rfl: 2  •  Omega-3 Fatty Acids (FISH OIL) 1000 MG capsule capsule, Take  by mouth Daily With Breakfast., Disp: , Rfl:   •  PARoxetine (PAXIL) 10 MG tablet, Take 1 tablet by mouth Every Morning., Disp: 90 tablet, Rfl: 1  Medications Discontinued During This Encounter   Medication Reason   • Cyanocobalamin (B-12) 5000 MCG capsule Duplicate order       Review of Systems   Constitutional: Negative for chills and fatigue.   HENT: Negative for congestion and rhinorrhea.    Respiratory: Negative for cough and shortness of breath.   "  Cardiovascular: Negative for chest pain and palpitations.   Gastrointestinal: Negative for abdominal pain, diarrhea, nausea and vomiting.   Genitourinary: Negative for difficulty urinating and dysuria.   Neurological: Negative for dizziness and headaches.             /70 (BP Location: Left arm, Patient Position: Sitting, Cuff Size: Adult)   Pulse 78   Temp 99 °F (37.2 °C) (Oral)   Resp 16   Ht 157.5 cm (62\")   Wt 62.6 kg (138 lb)   SpO2 94%   BMI 25.24 kg/m²       Physical Exam   Constitutional: She is oriented to person, place, and time. She appears well-developed and well-nourished. No distress.   HENT:   Head: Normocephalic and atraumatic.   Right Ear: External ear normal.   Left Ear: External ear normal.   Mouth/Throat: Oropharynx is clear and moist. No oropharyngeal exudate.   Eyes: Conjunctivae are normal. Right eye exhibits no discharge. Left eye exhibits no discharge. No scleral icterus.   Neck: Neck supple.   Cardiovascular: Normal rate, regular rhythm and normal heart sounds. Exam reveals no gallop and no friction rub.   No murmur heard.  Pulmonary/Chest: Effort normal and breath sounds normal. No respiratory distress. She has no wheezes. She has no rales.   Abdominal: Soft. Bowel sounds are normal. She exhibits no distension and no mass. There is no tenderness. There is no guarding.   Lymphadenopathy:     She has no cervical adenopathy.   Neurological: She is alert and oriented to person, place, and time.   Skin: Skin is warm. No rash noted.   Psychiatric: She has a normal mood and affect. Her behavior is normal.   Nursing note and vitals reviewed.        Results for orders placed or performed in visit on 10/04/18   TSH   Result Value Ref Range    TSH 0.795 0.270 - 4.200 mIU/mL   T4, Free   Result Value Ref Range    Free T4 1.27 0.93 - 1.70 ng/dL           Tierney was seen today for hypertension, hyperlipidemia and hypothyroidism.    Diagnoses and all orders for this visit:    Essential " hypertension  -     CBC & Differential  -     Comprehensive Metabolic Panel  -     Urinalysis With Culture If Indicated - Urine, Clean Catch  -     CBC & Differential  -     Comprehensive Metabolic Panel  -     Urinalysis With Culture If Indicated - Urine, Clean Catch    Mixed hyperlipidemia  -     Lipid Panel With LDL / HDL Ratio  -     CBC & Differential  -     Comprehensive Metabolic Panel  -     Lipid Panel With LDL / HDL Ratio    Acquired hypothyroidism  -     TSH  -     T4, Free  -     TSH  -     T4, Free    Post-menopausal  -     DEXA Bone Density Axial    Need for hepatitis C screening test  -     HCV Antibody Rfx To Qnt PCR      HTN is under good control and patient will continue to monitor with home BP cuff. No side effects from medications. Will continue current regimen of HCTZ6. Will follow up in 6 months      Patient's cholesterol is under good control. Will continue current regimen of Lipitor. No side effects from medications reported. Will follow up in 6 months to monitor levels.     TSH has been stable and patient is doing well. Will continue current regimen of levothyroxine 37.5 mcg and follow up levels in 6 months.     Hot flashes are stable on Paxil and will continue.     DEXA ordered today and will call with results.     Hep C screening today with labs.       Return in about 6 months (around 9/21/2019) for Recheck, Annual physical.    Jessica Galan MD  03/21/2019

## 2019-03-26 ENCOUNTER — APPOINTMENT (OUTPATIENT)
Dept: BONE DENSITY | Facility: HOSPITAL | Age: 64
End: 2019-03-26

## 2019-03-26 PROCEDURE — 77080 DXA BONE DENSITY AXIAL: CPT

## 2019-03-27 ENCOUNTER — TELEPHONE (OUTPATIENT)
Dept: INTERNAL MEDICINE | Facility: CLINIC | Age: 64
End: 2019-03-27

## 2019-03-27 NOTE — TELEPHONE ENCOUNTER
Advised via [employer].     ----- Message from Jessica Galan MD sent at 3/26/2019  2:07 PM EDT -----  Normal DEXA scan. Repeat in 2-3 years.

## 2019-03-30 LAB
ALBUMIN SERPL-MCNC: 4.6 G/DL (ref 3.5–5.2)
ALBUMIN/GLOB SERPL: 2.2 G/DL
ALP SERPL-CCNC: 63 U/L (ref 39–117)
ALT SERPL-CCNC: 20 U/L (ref 1–33)
APPEARANCE UR: CLEAR
AST SERPL-CCNC: 17 U/L (ref 1–32)
BACTERIA #/AREA URNS HPF: NORMAL /HPF
BASOPHILS # BLD AUTO: 0.04 10*3/MM3 (ref 0–0.2)
BASOPHILS NFR BLD AUTO: 0.8 % (ref 0–1.5)
BILIRUB SERPL-MCNC: 0.6 MG/DL (ref 0.2–1.2)
BILIRUB UR QL STRIP: NEGATIVE
BUN SERPL-MCNC: 22 MG/DL (ref 8–23)
BUN/CREAT SERPL: 31.4 (ref 7–25)
CALCIUM SERPL-MCNC: 10 MG/DL (ref 8.6–10.5)
CHLORIDE SERPL-SCNC: 101 MMOL/L (ref 98–107)
CHOLEST SERPL-MCNC: 188 MG/DL (ref 0–200)
CO2 SERPL-SCNC: 29 MMOL/L (ref 22–29)
COLOR UR: YELLOW
CREAT SERPL-MCNC: 0.7 MG/DL (ref 0.57–1)
EOSINOPHIL # BLD AUTO: 0.13 10*3/MM3 (ref 0–0.4)
EOSINOPHIL NFR BLD AUTO: 2.5 % (ref 0.3–6.2)
EPI CELLS #/AREA URNS HPF: NORMAL /HPF
ERYTHROCYTE [DISTWIDTH] IN BLOOD BY AUTOMATED COUNT: 12.1 % (ref 12.3–15.4)
GLOBULIN SER CALC-MCNC: 2.1 GM/DL
GLUCOSE SERPL-MCNC: 102 MG/DL (ref 65–99)
GLUCOSE UR QL: NEGATIVE
HCT VFR BLD AUTO: 41.9 % (ref 34–46.6)
HDLC SERPL-MCNC: 64 MG/DL (ref 40–60)
HGB BLD-MCNC: 14.2 G/DL (ref 12–15.9)
HGB UR QL STRIP: NEGATIVE
IMM GRANULOCYTES # BLD AUTO: 0.01 10*3/MM3 (ref 0–0.05)
IMM GRANULOCYTES NFR BLD AUTO: 0.2 % (ref 0–0.5)
KETONES UR QL STRIP: NEGATIVE
LDLC SERPL CALC-MCNC: 96 MG/DL (ref 0–100)
LDLC/HDLC SERPL: 1.5 {RATIO}
LEUKOCYTE ESTERASE UR QL STRIP: NEGATIVE
LYMPHOCYTES # BLD AUTO: 1.65 10*3/MM3 (ref 0.7–3.1)
LYMPHOCYTES NFR BLD AUTO: 32.2 % (ref 19.6–45.3)
MCH RBC QN AUTO: 32.2 PG (ref 26.6–33)
MCHC RBC AUTO-ENTMCNC: 33.9 G/DL (ref 31.5–35.7)
MCV RBC AUTO: 95 FL (ref 79–97)
MICRO URNS: NORMAL
MICRO URNS: NORMAL
MONOCYTES # BLD AUTO: 0.31 10*3/MM3 (ref 0.1–0.9)
MONOCYTES NFR BLD AUTO: 6.1 % (ref 5–12)
MUCOUS THREADS URNS QL MICRO: PRESENT /HPF
NEUTROPHILS # BLD AUTO: 2.98 10*3/MM3 (ref 1.4–7)
NEUTROPHILS NFR BLD AUTO: 58.2 % (ref 42.7–76)
NITRITE UR QL STRIP: NEGATIVE
NRBC BLD AUTO-RTO: 0 /100 WBC (ref 0–0)
PH UR STRIP: 6 [PH] (ref 5–7.5)
PLATELET # BLD AUTO: 233 10*3/MM3 (ref 140–450)
POTASSIUM SERPL-SCNC: 4.3 MMOL/L (ref 3.5–5.2)
PROT SERPL-MCNC: 6.7 G/DL (ref 6–8.5)
PROT UR QL STRIP: NEGATIVE
RBC # BLD AUTO: 4.41 10*6/MM3 (ref 3.77–5.28)
RBC #/AREA URNS HPF: NORMAL /HPF
SODIUM SERPL-SCNC: 143 MMOL/L (ref 136–145)
SP GR UR: 1.03 (ref 1–1.03)
T4 FREE SERPL-MCNC: 1.16 NG/DL (ref 0.93–1.7)
TRIGL SERPL-MCNC: 139 MG/DL (ref 0–150)
TSH SERPL DL<=0.005 MIU/L-ACNC: 1.1 MIU/ML (ref 0.27–4.2)
URINALYSIS REFLEX: NORMAL
UROBILINOGEN UR STRIP-MCNC: 0.2 MG/DL (ref 0.2–1)
VLDLC SERPL CALC-MCNC: 27.8 MG/DL (ref 5–40)
WBC # BLD AUTO: 5.12 10*3/MM3 (ref 3.4–10.8)
WBC #/AREA URNS HPF: NORMAL /HPF

## 2019-04-02 ENCOUNTER — TELEPHONE (OUTPATIENT)
Dept: INTERNAL MEDICINE | Facility: CLINIC | Age: 64
End: 2019-04-02

## 2019-04-02 NOTE — TELEPHONE ENCOUNTER
LVM with details and to call us back with any questions or concerns.     ----- Message from Jessica Galan MD sent at 4/1/2019 10:41 AM EDT -----  Labs are all normal except her BG is just a little high on the check at 102. Add HgA1c to her next set of labs that she has drawn in 9/2019. Work on eating less carbs and sweets.

## 2019-04-05 RX ORDER — ATORVASTATIN CALCIUM 10 MG/1
TABLET, FILM COATED ORAL
Qty: 90 TABLET | Refills: 1 | Status: SHIPPED | OUTPATIENT
Start: 2019-04-05 | End: 2020-01-08

## 2019-04-24 DIAGNOSIS — Z78.0 MENOPAUSE: ICD-10-CM

## 2019-04-24 RX ORDER — PAROXETINE 10 MG/1
TABLET, FILM COATED ORAL
Qty: 90 TABLET | Refills: 1 | Status: SHIPPED | OUTPATIENT
Start: 2019-04-24 | End: 2019-12-06 | Stop reason: SDUPTHER

## 2019-05-09 DIAGNOSIS — E03.9 ACQUIRED HYPOTHYROIDISM: ICD-10-CM

## 2019-05-09 RX ORDER — LEVOTHYROXINE SODIUM 0.03 MG/1
37.5 TABLET ORAL DAILY
Qty: 135 TABLET | Refills: 2 | Status: SHIPPED | OUTPATIENT
Start: 2019-05-09 | End: 2020-04-07

## 2019-06-27 ENCOUNTER — OFFICE VISIT (OUTPATIENT)
Dept: INTERNAL MEDICINE | Facility: CLINIC | Age: 64
End: 2019-06-27

## 2019-06-27 VITALS
DIASTOLIC BLOOD PRESSURE: 78 MMHG | HEART RATE: 76 BPM | HEIGHT: 62 IN | BODY MASS INDEX: 25.03 KG/M2 | RESPIRATION RATE: 14 BRPM | SYSTOLIC BLOOD PRESSURE: 134 MMHG | OXYGEN SATURATION: 98 % | WEIGHT: 136 LBS

## 2019-06-27 DIAGNOSIS — M54.32 SCIATICA OF LEFT SIDE: Primary | ICD-10-CM

## 2019-06-27 PROCEDURE — 99214 OFFICE O/P EST MOD 30 MIN: CPT | Performed by: INTERNAL MEDICINE

## 2019-06-27 RX ORDER — BACLOFEN 10 MG/1
10 TABLET ORAL 2 TIMES DAILY PRN
Qty: 30 TABLET | Refills: 0 | Status: SHIPPED | OUTPATIENT
Start: 2019-06-27 | End: 2019-09-17

## 2019-06-27 RX ORDER — MELOXICAM 7.5 MG/1
7.5 TABLET ORAL DAILY
Qty: 30 TABLET | Refills: 0 | Status: SHIPPED | OUTPATIENT
Start: 2019-06-27 | End: 2019-09-17

## 2019-06-27 NOTE — PROGRESS NOTES
"      Tierney Rolon is a 63 y.o. female, who presents with a chief complaint of   Chief Complaint   Patient presents with   • Hip Pain     L hip, numbness, down to L foot, 3 x months   • Leg Pain     \" \"        62 yo F here for left hip pain that radiates now into her left lateral thigh and down her leg. Has been getting worse over the last three months. She has numbness and tingling down the left leg to include her lateral two toes. Squatting does not make it hurt worse. Heating pad helps. Tylenol or Salon pas does nont help. Couldn't get comfortable last night. No trauma and no new activities. Driving makes it worse. Right leg feels fine. Pain feels like a toothache.          The following portions of the patient's history were reviewed and updated as appropriate: allergies, current medications, past family history, past medical history, past social history, past surgical history and problem list.    Allergies: Prednisone    Current Outpatient Medications:   •  aspirin 81 MG tablet, Take  by mouth daily., Disp: , Rfl:   •  atorvastatin (LIPITOR) 10 MG tablet, TAKE 1 TABLET DAILY (NEED TO SCHEDULE AN APPOINTMENT), Disp: 90 tablet, Rfl: 1  •  Coenzyme Q10 (COQ10 GUMMIES ADULT PO), Take 100 mg by mouth., Disp: , Rfl:   •  Glucosamine HCl 1000 MG tablet, Take  by mouth., Disp: , Rfl:   •  hydrochlorothiazide (HYDRODIURIL) 25 MG tablet, TAKE 1 TABLET DAILY, Disp: 90 tablet, Rfl: 3  •  levothyroxine (SYNTHROID, LEVOTHROID) 25 MCG tablet, Take 1.5 tablets by mouth Daily., Disp: 135 tablet, Rfl: 2  •  Omega-3 Fatty Acids (FISH OIL) 1000 MG capsule capsule, Take  by mouth Daily With Breakfast., Disp: , Rfl:   •  PARoxetine (PAXIL) 10 MG tablet, TAKE 1 TABLET EVERY MORNING, Disp: 90 tablet, Rfl: 1  •  baclofen (LIORESAL) 10 MG tablet, Take 1 tablet by mouth 2 (Two) Times a Day As Needed for Muscle Spasms., Disp: 30 tablet, Rfl: 0  •  meloxicam (MOBIC) 7.5 MG tablet, Take 1 tablet by mouth Daily., Disp: 30 tablet, Rfl: " "0  There are no discontinued medications.    Review of Systems   Constitutional: Negative for chills and fatigue.   Cardiovascular: Negative for palpitations and leg swelling.   Musculoskeletal: Positive for arthralgias and back pain.   Neurological: Positive for numbness.             /78 (BP Location: Left arm, Patient Position: Sitting, Cuff Size: Adult)   Pulse 76   Resp 14   Ht 157.5 cm (62\")   Wt 61.7 kg (136 lb)   SpO2 98%   BMI 24.87 kg/m²       Physical Exam   Constitutional: She is oriented to person, place, and time. She appears well-developed and well-nourished. No distress.   HENT:   Head: Normocephalic and atraumatic.   Right Ear: External ear normal.   Left Ear: External ear normal.   Mouth/Throat: Oropharynx is clear and moist. No oropharyngeal exudate.   Eyes: Conjunctivae are normal. Right eye exhibits no discharge. Left eye exhibits no discharge. No scleral icterus.   Neck: Neck supple.   Pulmonary/Chest: Effort normal.   Musculoskeletal:        Lumbar back: Normal.   Negative straight leg test    Lymphadenopathy:     She has no cervical adenopathy.   Neurological: She is alert and oriented to person, place, and time.   Reflex Scores:       Patellar reflexes are 2+ on the right side and 2+ on the left side.  Skin: Skin is warm. No rash noted.   Psychiatric: She has a normal mood and affect. Her behavior is normal.   Nursing note and vitals reviewed.        Results for orders placed or performed in visit on 03/21/19   Comprehensive Metabolic Panel   Result Value Ref Range    Glucose 102 (H) 65 - 99 mg/dL    BUN 22 8 - 23 mg/dL    Creatinine 0.70 0.57 - 1.00 mg/dL    eGFR Non African Am 85 >60 mL/min/1.73    eGFR African Am 103 >60 mL/min/1.73    BUN/Creatinine Ratio 31.4 (H) 7.0 - 25.0    Sodium 143 136 - 145 mmol/L    Potassium 4.3 3.5 - 5.2 mmol/L    Chloride 101 98 - 107 mmol/L    Total CO2 29.0 22.0 - 29.0 mmol/L    Calcium 10.0 8.6 - 10.5 mg/dL    Total Protein 6.7 6.0 - 8.5 g/dL    " Albumin 4.60 3.50 - 5.20 g/dL    Globulin 2.1 gm/dL    A/G Ratio 2.2 g/dL    Total Bilirubin 0.6 0.2 - 1.2 mg/dL    Alkaline Phosphatase 63 39 - 117 U/L    AST (SGOT) 17 1 - 32 U/L    ALT (SGPT) 20 1 - 33 U/L   Lipid Panel With LDL / HDL Ratio   Result Value Ref Range    Total Cholesterol 188 0 - 200 mg/dL    Triglycerides 139 0 - 150 mg/dL    HDL Cholesterol 64 (H) 40 - 60 mg/dL    VLDL Cholesterol 27.8 5 - 40 mg/dL    LDL Cholesterol  96 0 - 100 mg/dL    LDL/HDL Ratio 1.50    TSH   Result Value Ref Range    TSH 1.100 0.270 - 4.200 mIU/mL   Urinalysis With Culture If Indicated - Urine, Clean Catch   Result Value Ref Range    Specific Gravity, UA 1.027 1.005 - 1.030    pH, UA 6.0 5.0 - 7.5    Color, UA Yellow Yellow    Appearance, UA Clear Clear    Leukocytes, UA Negative Negative    Protein Negative Negative/Trace    Glucose, UA Negative Negative    Ketones Negative Negative    Blood, UA Negative Negative    Bilirubin, UA Negative Negative    Urobilinogen, UA 0.2 0.2 - 1.0 mg/dL    Nitrite, UA Negative Negative    Microscopic Examination Comment     Microscopic Examination See below:     Urinalysis Reflex Comment    T4, Free   Result Value Ref Range    Free T4 1.16 0.93 - 1.70 ng/dL   Microscopic Examination -   Result Value Ref Range    WBC, UA 0-5 0 - 5 /hpf    RBC, UA 0-2 0 - 2 /hpf    Epithelial Cells (non renal) 0-10 0 - 10 /hpf    Mucus, UA Present Not Estab. /hpf    Bacteria, UA None seen None seen/Few /hpf   CBC & Differential   Result Value Ref Range    WBC 5.12 3.40 - 10.80 10*3/mm3    RBC 4.41 3.77 - 5.28 10*6/mm3    Hemoglobin 14.2 12.0 - 15.9 g/dL    Hematocrit 41.9 34.0 - 46.6 %    MCV 95.0 79.0 - 97.0 fL    MCH 32.2 26.6 - 33.0 pg    MCHC 33.9 31.5 - 35.7 g/dL    RDW 12.1 (L) 12.3 - 15.4 %    Platelets 233 140 - 450 10*3/mm3    Neutrophil Rel % 58.2 42.7 - 76.0 %    Lymphocyte Rel % 32.2 19.6 - 45.3 %    Monocyte Rel % 6.1 5.0 - 12.0 %    Eosinophil Rel % 2.5 0.3 - 6.2 %    Basophil Rel % 0.8 0.0 -  1.5 %    Neutrophils Absolute 2.98 1.40 - 7.00 10*3/mm3    Lymphocytes Absolute 1.65 0.70 - 3.10 10*3/mm3    Monocytes Absolute 0.31 0.10 - 0.90 10*3/mm3    Eosinophils Absolute 0.13 0.00 - 0.40 10*3/mm3    Basophils Absolute 0.04 0.00 - 0.20 10*3/mm3    Immature Granulocyte Rel % 0.2 0.0 - 0.5 %    Immature Grans Absolute 0.01 0.00 - 0.05 10*3/mm3    nRBC 0.0 0.0 - 0.0 /100 WBC           Tierney was seen today for hip pain and leg pain.    Diagnoses and all orders for this visit:    Sciatica of left side    Other orders  -     baclofen (LIORESAL) 10 MG tablet; Take 1 tablet by mouth 2 (Two) Times a Day As Needed for Muscle Spasms.  -     meloxicam (MOBIC) 7.5 MG tablet; Take 1 tablet by mouth Daily.      Will do stretches, NSAID and muscle relaxer   If not better, we can do formal PT   No red flag signs   Allergy to steroid or I would give these to her     Return if symptoms worsen or fail to improve.    Jessica Galan MD  06/27/2019

## 2019-07-15 ENCOUNTER — TELEPHONE (OUTPATIENT)
Dept: INTERNAL MEDICINE | Facility: CLINIC | Age: 64
End: 2019-07-15

## 2019-07-15 DIAGNOSIS — M54.32 SCIATICA OF LEFT SIDE: Primary | ICD-10-CM

## 2019-07-15 NOTE — TELEPHONE ENCOUNTER
"Referral placed.     Regarding: RE: Visit Follow-Up Question  Contact: [phone]  ----- Message from Mychart, Generic sent at 7/12/2019  7:47 PM EDT -----    Close to home for PT would be preferred.  Thank you.  ----- Message -----  From: Jessica Galan MD  Sent: 7/11/2019  2:30 PM EDT  To: Tierney Rolon  Subject: RE: Visit Follow-Up Question  Sorry to hear this. I think that we try physical therapy and if this doesn't work, we can pursue imaging. Where would you like to do PT? Close to home? Let me know and I will put in referral.       ----- Message -----     From: Tierney Rolon     Sent: 7/11/2019  2:07 PM EDT       To: Jessica Galan MD  Subject: Visit Follow-Up Question    Dr. Galan, I am sending this to you on July 11, at 2:07 pm.  You had asked me to let you know how I was feeling after about 10 days, as a result of my appointment with you on June 27.  I have been taking the two medications, as prescribed.  And I have been doing the exercises you gave me, once per day.  The combination of the medication and exercises is not yielding much relief.  It seems the only time I get any real relief is when I am either sitting or laying down.  This sciatica has truly put a damper on my mobility.  I am a very busy person with horses to care for and other matters around our home; and, unfortunately, I don't have time to sit down as much as I am needing to, in order to get relief.  Can we move to \"Plan B\"?  Thank you.      "

## 2019-07-18 ENCOUNTER — HOSPITAL ENCOUNTER (OUTPATIENT)
Dept: PHYSICAL THERAPY | Facility: HOSPITAL | Age: 64
Setting detail: THERAPIES SERIES
Discharge: HOME | End: 2019-07-18

## 2019-07-18 DIAGNOSIS — M54.32 SCIATICA OF LEFT SIDE: Primary | ICD-10-CM

## 2019-07-18 PROCEDURE — 97110 THERAPEUTIC EXERCISES: CPT | Performed by: PHYSICAL THERAPIST

## 2019-07-18 PROCEDURE — 97161 PT EVAL LOW COMPLEX 20 MIN: CPT | Performed by: PHYSICAL THERAPIST

## 2019-07-18 NOTE — THERAPY EVALUATION
Outpatient Physical Therapy Ortho Initial Evaluation  RADHA Munguia     Patient Name: Tierney Rolon  : 1955  MRN: [id number]  Today's Date: 2019      Visit Date: 2019    Patient Active Problem List   Diagnosis   • Lymphocytic colitis   • Hypertension   • Hyperlipidemia   • Left bundle branch block (LBBB)   • Osteopenia   • Acquired hypothyroidism   • Chronic bilateral low back pain without sciatica   • Post-menopausal        Past Medical History:   Diagnosis Date   • Chronic bilateral low back pain without sciatica 8/15/2017   • Disease of thyroid gland     SUBCLINICAL HYPOTHYROIDISM   • GERD (gastroesophageal reflux disease)    • Hx of Vincenzo Mountain spotted fever    • Hyperlipidemia    • Hypertension    • Menopause 8/15/2017   • Osteopenia    • Routine adult health maintenance 8/15/2017   • Screening mammogram, encounter for 2017   • Supraventricular tachycardia (CMS/HCC)         Past Surgical History:   Procedure Laterality Date   • BREAST BIOPSY Left     benign   • BREAST SURGERY     • CARDIAC ABLATION  2009    [facility] at [facility]   • FRACTURE SURGERY      [facility]   • TONSILLECTOMY     • UTERINE FIBROID SURGERY         Visit Dx:     ICD-10-CM ICD-9-CM   1. Sciatica of left side M54.32 724.3         Patient History     Row Name 19 1000             History    Chief Complaint  Pain;Difficulty with daily activities  -SP      Type of Pain  Back pain  -SP      Date Current Problem(s) Began  -- mid 2019  -SP      Brief Description of Current Complaint  Patient reports insidious onset of left side hip pain in 2019.   She states that she spoke with her MD and initially just tried glucosomine but did not notice any changes in symptoms.  She was precribed meds but reports that they do not help.  She is taking Tylenol regularly  -SP      Previous treatment for THIS PROBLEM  Medication  -SP      Patient/Caregiver Goals  Relieve pain;Return to prior level of  function  -SP      Current Tobacco Use  no  -SP      Patient's Rating of General Health  Very good  -SP      Occupation/sports/leisure activities  cares for 2 horses  -SP      How has patient tried to help current problem?  tylenol; uses heating pad  -SP         Pain     Pain Location  Hip numbness to lateral toes and medial aspect of foot  -SP      Pain at Present  3  -SP      Pain at Best  3  -SP      Pain at Worst  8  -SP      Pain Frequency  Constant/continuous  -SP      Pain Description  Numbness;Aching  -SP      What Performance Factors Make the Current Problem(s) WORSE?  standing and walking, prolonged driving  -SP      What Performance Factors Make the Current Problem(s) BETTER?  sitting; taking weight off of left LE  -SP      Tolerance Time- Standing  <hour but with difficulty, frequently takes weight off of left LE  -SP      Tolerance Time- Sitting  cannot not tolerate prolonged sit to ride in car  -SP      Tolerance Time- Walking  limited <hour  -SP      Is your sleep disturbed?  Yes  -SP      What position do you sleep in?  Supine;Right sidelying  -SP      Difficulties at work?  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  difficulty tolerating any standing ADLs dishes, laundry cleaning etc  -SP         Fall Risk Assessment    Any falls in the past year:  No  -SP         Daily Activities    Primary Language  English  -SP      Are you able to read  Yes  -SP      Are you able to write  Yes  -SP      How does patient learn best?  Listening;Reading  -SP      Teaching needs identified  Home Exercise Program;Management of Condition  -SP      Patient is concerned about/has problems with  Standing;Walking  -SP      Barriers to learning  None  -SP      Pt Participated in POC and Goals  Yes  -SP         Safety    Are you being hurt, hit, or frightened by anyone at home or in your life?  No  -SP      Are you being neglected by a caregiver  No   -SP        User Key  (r) = Recorded By, (t) = Taken By, (c) = Cosigned By    Initials Name Provider Type    SP Jessica Leal, PT Physical Therapist          PT Ortho     Row Name 07/18/19 1200       Posture/Observations    Iliac crests  Bilateral:;Normal  -SP    Posture/Observations Comments  appears to have left posterior innominate  -SP       DTR- Lower Quarter Clearing    Patellar tendon (L2-4)  Bilateral:;2- Normal response  -SP    Achilles tendon (S1-2)  Bilateral:;2- Normal response  -SP       Neural Tension Signs- Lower Quarter Clearing    SLR  Bilateral:;Negative  -SP       Sensory Screen for Light Touch- Lower Quarter Clearing    L1 (inguinal area)  Bilateral:;Intact  -SP    L2 (anterior mid thigh)  Bilateral:;Intact  -SP    L3 (distal anterior thigh)  Bilateral:;Intact  -SP    L4 (medial lower leg/foot)  Bilateral:;Intact  -SP    L5 (lateral lower leg/great toe)  Bilateral:;Intact  -SP    S1 (bottom of foot)  Bilateral:;Intact  -SP       Myotomal Screen- Lower Quarter Clearing    Hip flexion (L2)  Bilateral:;4+ (Good +)  -SP    Knee extension (L3)  Bilateral:;4+ (Good +)  -SP    Ankle DF (L4)  Bilateral:;4+ (Good +)  -SP    Great toe extension (L5)  Bilateral:;4+ (Good +)  -SP    Ankle PF (S1)  Bilateral:;4+ (Good +)  -SP    Knee flexion (S2)  Bilateral:;4+ (Good +)  -SP       Lumbar/SI Special Tests    Standing Flexion Test (SI Dysfunction)  Left:;Positive  -SP    Stork Test (SI Dysfunction)  Left:;Positive  -SP    SLR (Neural Tension)  Bilateral:;Negative  -SP       Lumbosacral Palpation    Piriformis  Left:;Tender;Guarded/taut  -SP    Erector Spinae (Paraspinals)  Bilateral:;Guarded/taut  -SP       Head/Neck/Trunk    Trunk Extension AROM  decreased by 50% from full range  -SP    Trunk Flexion AROM  wfl  -SP    Trunk Lt Lateral Flexion AROM  wfl  -SP    Trunk Rt Lateral Flexion AROM  wfl  -SP    Trunk Lt Rotation AROM  wfl  -SP    Trunk Rt Rotation AROM  wfl  -SP       MMT Neck/Trunk    Trunk  Flexion MMT, Gross Movement  (2+/5) poor plus  -SP    Left Pelvic Elevation MMT, Gross Movement  (2/5) poor  -SP    Right Pelvic Elevation MMT, Gross Movement:  (2/5) poor  -SP       Sensation    Sensation WNL?  WFL  -SP       Lower Extremity Flexibility    Hamstrings  Bilateral:;Moderately limited  -SP    Hip Flexors  Bilateral:;Mildly limited  -SP    Hip External Rotators  Bilateral:;Moderately limited  -SP    Hip Internal Rotators  Bilateral:;Moderately limited  -SP       Transfers    Sit-Stand Archer (Transfers)  independent  -SP    Stand-Sit Archer (Transfers)  independent  -SP    Comment (Transfers)  independent transfers sup/sit/stand with cues to use log roll technique  -SP       Gait/Stairs Assessment/Training    Archer Level (Gait)  independent  -SP    Bilateral Gait Deviations  forward flexed posture  -SP      User Key  (r) = Recorded By, (t) = Taken By, (c) = Cosigned By    Initials Name Provider Type    Jessica Grant, PT Physical Therapist                      Therapy Education  Given: HEP  Program: New  How Provided: Verbal, Written  Provided to: Patient  Level of Understanding: Verbalized, Demonstrated     PT OP Goals     Row Name 07/18/19 1200          PT Short Term Goals    STG Date to Achieve  08/17/19  -SP     STG 1  Patient reports decreased radicular type symptoms into left LE by 25%  -SP     STG 2  Patient instructed in and demonstrates appropriate technique with lifting floor to waist level <5 lbs  -SP     STG 3  Patient reports demonstrates appropriate technique with transfers sup/sit  -SP        Long Term Goals    LTG Date to Achieve  08/17/19  -SP     LTG 1  Patient demonstrates increased trunk strength by one muscle grade to better tolerate ADLs  -SP     LTG 2  Patient reports improved tolerance for weight bearing activity >60 min with pain level <2/10 at worst  -SP     LTG 3  Patient independent with HEP  -SP        Time Calculation    PT Goal Re-Cert Due  Date  08/17/19  -SP       User Key  (r) = Recorded By, (t) = Taken By, (c) = Cosigned By    Initials Name Provider Type    Jessica Grant, PT Physical Therapist          PT Assessment/Plan     Row Name 07/18/19 1325 07/18/19 1324       PT Assessment    Functional Limitations  --  Limitation in home management;Limitations in community activities;Performance in work activities;Performance in self-care ADL;Performance in leisure activities;Limitations in functional capacity and performance  -SP    Impairments  --  Range of motion;Posture;Poor body mechanics;Muscle strength;Pain  -SP    Assessment Comments  Patient with insidious onset of left hip area pain that radiates to LE beginning in March 2019.  Patient is active in caring for her horses at home and reports limitation due to increased pain with weight bearing activity.  She presents with pain and left LE radicular symptoms, decreased trunk strength, and difficulty tolerating weight bearing ADLs  -SP  Patient   -SP    Please refer to paper survey for additional self-reported information  Yes  -SP  --    Rehab Potential  Good  -SP  --    Patient/caregiver participated in establishment of treatment plan and goals  Yes  -SP  --    Patient would benefit from skilled therapy intervention  Yes  -SP  --       PT Plan    PT Frequency  2x/week  -SP  --    Predicted Duration of Therapy Intervention (Therapy Eval)  4 weeks  -SP  --    Planned CPT's?  PT EVAL LOW COMPLEXITY: 17122;PT MANUAL THERAPY EA 15 MIN: 89155;PT HOT OR COLD PACK TREAT MCARE;PT ULTRASOUND EA 15 MIN: 63561;PT THER PROC EA 15 MIN: 54695;PT ELECTRICAL STIM UNATTEND:   -SP  --      User Key  (r) = Recorded By, (t) = Taken By, (c) = Cosigned By    Initials Name Provider Type    Jessica Grant, PT Physical Therapist          Modalities     Row Name 07/18/19 1100             Moist Heat    MH Applied  Yes  -SP      Location  L/S area  -SP      Rx Minutes  12 mins  -SP      MH Prior  to Rx  Yes  -SP        User Key  (r) = Recorded By, (t) = Taken By, (c) = Cosigned By    Initials Name Provider Type    Jessica Grant, PT Physical Therapist        Exercises     Row Name 07/18/19 1200             Exercise 1    Exercise Name 1  DKTC  -SP      Cueing 1  Verbal;Tactile  -SP      Reps 1  3  -SP      Time 1  20 sec  -SP         Exercise 2    Exercise Name 2  Piriformis stretch  -SP      Reps 2  3  -SP      Time 2  20 sec  -SP         Exercise 3    Exercise Name 3  hamstring stretch  -SP      Reps 3  3  -SP      Time 3  20 sec  -SP         Exercise 4    Exercise Name 4  PPT  -SP      Reps 4  15  -SP      Time 4  5 sec  -SP         Exercise 5    Exercise Name 5  sidelying on right for postional traction over pillow  -SP        User Key  (r) = Recorded By, (t) = Taken By, (c) = Cosigned By    Initials Name Provider Type    Jessica Grant, PT Physical Therapist                        Outcome Measure Options: Other Outcome Measure  Other Outcome Measure Tool Used  Other Outcome Measure Tool Comments: Back index score 36      Time Calculation:     Start Time: 1015  Stop Time: 1125  Time Calculation (min): 70 min     Therapy Charges for Today     Code Description Service Date Service Provider Modifiers Qty    98617902067 HC PT THER PROC EA 15 MIN 7/18/2019 Jessica Leal, PT GP 1    08795340565 HC PT EVAL LOW COMPLEXITY 2 7/18/2019 Jessica Leal, PT GP 1          PT G-Codes  Outcome Measure Options: Other Outcome Measure         Jessica Leal, PT  7/18/2019

## 2019-07-23 ENCOUNTER — HOSPITAL ENCOUNTER (OUTPATIENT)
Dept: PHYSICAL THERAPY | Facility: HOSPITAL | Age: 64
Setting detail: THERAPIES SERIES
Discharge: HOME | End: 2019-07-23

## 2019-07-23 DIAGNOSIS — M54.32 SCIATICA OF LEFT SIDE: Primary | ICD-10-CM

## 2019-07-23 PROCEDURE — 97012 MECHANICAL TRACTION THERAPY: CPT | Performed by: PHYSICAL THERAPIST

## 2019-07-23 PROCEDURE — 97110 THERAPEUTIC EXERCISES: CPT | Performed by: PHYSICAL THERAPIST

## 2019-07-23 NOTE — THERAPY TREATMENT NOTE
Outpatient Physical Therapy Ortho Treatment Note  RADHA Munguia     Patient Name: Tierney Rolon  : 1955  MRN: [id number]  Today's Date: 2019      Visit Date: 2019    Visit Dx:    ICD-10-CM ICD-9-CM   1. Sciatica of left side M54.32 724.3       Patient Active Problem List   Diagnosis   • Lymphocytic colitis   • Hypertension   • Hyperlipidemia   • Left bundle branch block (LBBB)   • Osteopenia   • Acquired hypothyroidism   • Chronic bilateral low back pain without sciatica   • Post-menopausal        Past Medical History:   Diagnosis Date   • Chronic bilateral low back pain without sciatica 8/15/2017   • Disease of thyroid gland     SUBCLINICAL HYPOTHYROIDISM   • GERD (gastroesophageal reflux disease)    • Hx of Vincenzo Mountain spotted fever    • Hyperlipidemia    • Hypertension    • Menopause 8/15/2017   • Osteopenia    • Routine adult health maintenance 8/15/2017   • Screening mammogram, encounter for 2017   • Supraventricular tachycardia (CMS/HCC)         Past Surgical History:   Procedure Laterality Date   • BREAST BIOPSY Left     benign   • BREAST SURGERY     • CARDIAC ABLATION  2009    [facility] at [facility]   • FRACTURE SURGERY      [facility]   • TONSILLECTOMY     • UTERINE FIBROID SURGERY         PT Ortho     Row Name 19 1000       Subjective Comments    Subjective Comments  Patient reports that she has been doing stretches at home without difficulty.  She reports that tried positional traction but did not notice any change in symptoms.  -SP      User Key  (r) = Recorded By, (t) = Taken By, (c) = Cosigned By    Initials Name Provider Type    Jessica Grant, PT Physical Therapist                      PT Assessment/Plan     Row Name 19 6594          PT Assessment    Assessment Comments  Patient tolerates mechanical lumbar traction without increased symptoms.    -SP        PT Plan    PT Plan Comments  Assess tolerance for traction  -SP       User  Key  (r) = Recorded By, (t) = Taken By, (c) = Cosigned By    Initials Name Provider Type    Jessica Grant, PT Physical Therapist          Modalities     Row Name 07/23/19 1000             Moist Heat    MH Applied  Yes  -SP      Location  L/S area  -SP      Rx Minutes  12 mins  -SP      MH Prior to Rx  Yes  -SP         Traction 83181    Traction Type  Lumbar  -SP      Rx Minutes  15  -SP      Duration  Intermittent  -SP      Position  Hook-lying  -SP      Weight  65  -SP      Hold  60  -SP      Relax  20  -SP        User Key  (r) = Recorded By, (t) = Taken By, (c) = Cosigned By    Initials Name Provider Type    Jessica Grant, PT Physical Therapist        Exercises     Row Name 07/23/19 1000             Subjective Comments    Subjective Comments  Patient reports that she has been doing stretches at home without difficulty.  She reports that tried positional traction but did not notice any change in symptoms.  -SP         Exercise 1    Exercise Name 1  DKTC  -SP      Cueing 1  Verbal;Tactile  -SP      Reps 1  3  -SP      Time 1  20 sec  -SP         Exercise 2    Exercise Name 2  Piriformis stretch  -SP      Reps 2  3  -SP      Time 2  20 sec  -SP         Exercise 3    Exercise Name 3  hamstring stretch  -SP      Reps 3  3  -SP      Time 3  20 sec  -SP         Exercise 4    Exercise Name 4  PPT  -SP      Reps 4  15  -SP      Time 4  5 sec  -SP         Exercise 5    Exercise Name 5  sidelying on right for postional traction over pillow  -SP        User Key  (r) = Recorded By, (t) = Taken By, (c) = Cosigned By    Initials Name Provider Type    Jessica Grant, PT Physical Therapist                           Therapy Education  Given: HEP  Program: Reinforced  How Provided: Verbal  Provided to: Patient  Level of Understanding: Verbalized, Demonstrated              Time Calculation:   Start Time: 1000  Stop Time: 1112  Time Calculation (min): 72 min  Therapy Charges for Today     Code  Description Service Date Service Provider Modifiers Qty    30594435822 HC PT TRACTION LUMBAR 7/23/2019 Jessica Leal, PT GP 1    03581384299 HC PT THER PROC EA 15 MIN 7/23/2019 Jessica Leal, PT GP 1                    Jessica Leal, PT  7/23/2019

## 2019-07-24 ENCOUNTER — TELEPHONE (OUTPATIENT)
Dept: INTERNAL MEDICINE | Facility: CLINIC | Age: 64
End: 2019-07-24

## 2019-07-24 NOTE — TELEPHONE ENCOUNTER
Notation added to chart 07/24/19      ----- Message from Lien Vargas sent at 7/24/2019  2:48 PM EDT -----  Regarding: FW: appointment request  Contact: [phone]      ----- Message -----  From: Lien Vargas  Sent: 6/25/2019   4:25 PM  To: Sirena Allan CMA  Subject: appointment request                              [place] pt    Patient called to say that she thinks that she might have a pinched nerve. Her left hip has had pain and numbness down to her toes.  Please advise for scheduling.    Thanks!  Lien

## 2019-07-25 ENCOUNTER — HOSPITAL ENCOUNTER (OUTPATIENT)
Dept: PHYSICAL THERAPY | Facility: HOSPITAL | Age: 64
Setting detail: THERAPIES SERIES
Discharge: HOME | End: 2019-07-25

## 2019-07-25 DIAGNOSIS — M54.32 SCIATICA OF LEFT SIDE: Primary | ICD-10-CM

## 2019-07-25 PROCEDURE — 97110 THERAPEUTIC EXERCISES: CPT | Performed by: PHYSICAL THERAPIST

## 2019-07-25 PROCEDURE — 97012 MECHANICAL TRACTION THERAPY: CPT | Performed by: PHYSICAL THERAPIST

## 2019-07-25 NOTE — THERAPY TREATMENT NOTE
Outpatient Physical Therapy Ortho Treatment Note  RADHA Munguia     Patient Name: Tierney Rolon  : 1955  MRN: [id number]  Today's Date: 2019      Visit Date: 2019    Visit Dx:    ICD-10-CM ICD-9-CM   1. Sciatica of left side M54.32 724.3       Patient Active Problem List   Diagnosis   • Lymphocytic colitis   • Hypertension   • Hyperlipidemia   • Left bundle branch block (LBBB)   • Osteopenia   • Acquired hypothyroidism   • Chronic bilateral low back pain without sciatica   • Post-menopausal        Past Medical History:   Diagnosis Date   • Chronic bilateral low back pain without sciatica 8/15/2017   • Disease of thyroid gland     SUBCLINICAL HYPOTHYROIDISM   • GERD (gastroesophageal reflux disease)    • Hx of Vincenzo Mountain spotted fever    • Hyperlipidemia    • Hypertension    • Menopause 8/15/2017   • Osteopenia    • Routine adult health maintenance 8/15/2017   • Screening mammogram, encounter for 2017   • Supraventricular tachycardia (CMS/HCC)         Past Surgical History:   Procedure Laterality Date   • BREAST BIOPSY Left     benign   • BREAST SURGERY     • CARDIAC ABLATION  2009    K[facility]ik Hernandez at [facility]   • FRACTURE SURGERY      [facility]   • TONSILLECTOMY     • UTERINE FIBROID SURGERY         PT Ortho     Row Name 19 1005       Subjective Comments    Subjective Comments  Patient reports that she felt okay following traction but had some pain into her left leg the next day.  She reports that today, her left leg feels like it normally does with numbness into foot, mostly her heel today.   -SP    Row Name 19 1000       Subjective Comments    Subjective Comments  Patient reports that she has been doing stretches at home without difficulty.  She reports that tried positional traction but did not notice any change in symptoms.  -SP      User Key  (r) = Recorded By, (t) = Taken By, (c) = Cosigned By    Initials Name Provider Type    Jessica Grant,  PT Physical Therapist                      PT Assessment/Plan     Row Name 07/25/19 1154          PT Assessment    Assessment Comments  Patient with some increased and painfull sensation in to left LE following traction last visit.  She tolerates traction today without complaints of pain or changes in LE symptoms  -SP        PT Plan    PT Plan Comments  Continue to progress as tolerable  -SP       User Key  (r) = Recorded By, (t) = Taken By, (c) = Cosigned By    Initials Name Provider Type    Jessica Grant, PT Physical Therapist          Modalities     Row Name 07/25/19 1005             Moist Heat    MH Applied  Yes  -SP      Location  L/S area  -SP      Rx Minutes  12 mins  -SP      MH Prior to Rx  Yes  -SP         Traction 25672    Traction Type  Lumbar  -SP      Rx Minutes  15  -SP      Position  Hook-lying  -SP      Weight  65  -SP      Hold  60  -SP      Relax  20  -SP        User Key  (r) = Recorded By, (t) = Taken By, (c) = Cosigned By    Initials Name Provider Type    Jessica Grant, PT Physical Therapist        Exercises     Row Name 07/25/19 1005             Subjective Comments    Subjective Comments  Patient reports that she felt okay following traction but had some pain into her left leg the next day.  She reports that today, her left leg feels like it normally does with numbness into foot, mostly her heel today.   -SP         Exercise 1    Exercise Name 1  DKTC  -SP      Cueing 1  Verbal;Tactile  -SP      Reps 1  3  -SP      Time 1  20 sec  -SP         Exercise 2    Exercise Name 2  Piriformis stretch  -SP      Reps 2  3  -SP      Time 2  20 sec  -SP         Exercise 3    Exercise Name 3  hamstring stretch  -SP      Reps 3  3  -SP      Time 3  20 sec  -SP         Exercise 4    Exercise Name 4  PPT  -SP      Reps 4  15  -SP      Time 4  5 sec  -SP         Exercise 5    Exercise Name 5  sidelying on right for postional traction over pillow  -SP        User Key  (r) = Recorded  By, (t) = Taken By, (c) = Cosigned By    Initials Name Provider Type    SP Jessica Leal, PT Physical Therapist                                          Time Calculation:   Start Time: 1000  Stop Time: 1110  Time Calculation (min): 70 min  Therapy Charges for Today     Code Description Service Date Service Provider Modifiers Qty    76352078604  PT TRACTION LUMBAR 7/25/2019 Jessica Leal, PT GP 1    [id number]  PT THER PROC EA 15 MIN 7/25/2019 Jessica Leal, PT GP 1                    Jessica Leal, PT  7/25/2019

## 2019-07-29 ENCOUNTER — HOSPITAL ENCOUNTER (OUTPATIENT)
Dept: PHYSICAL THERAPY | Facility: HOSPITAL | Age: 64
Setting detail: THERAPIES SERIES
Discharge: HOME | End: 2019-07-29

## 2019-07-29 DIAGNOSIS — M54.32 SCIATICA OF LEFT SIDE: Primary | ICD-10-CM

## 2019-07-29 PROCEDURE — 97110 THERAPEUTIC EXERCISES: CPT | Performed by: PHYSICAL THERAPIST

## 2019-07-29 PROCEDURE — 97012 MECHANICAL TRACTION THERAPY: CPT | Performed by: PHYSICAL THERAPIST

## 2019-07-29 NOTE — THERAPY TREATMENT NOTE
Outpatient Physical Therapy Ortho Treatment Note  RADHA Munguia     Patient Name: Tierney Rolon  : 1955  MRN: [id number]  Today's Date: 2019      Visit Date: 2019    Visit Dx:    ICD-10-CM ICD-9-CM   1. Sciatica of left side M54.32 724.3       Patient Active Problem List   Diagnosis   • Lymphocytic colitis   • Hypertension   • Hyperlipidemia   • Left bundle branch block (LBBB)   • Osteopenia   • Acquired hypothyroidism   • Chronic bilateral low back pain without sciatica   • Post-menopausal        Past Medical History:   Diagnosis Date   • Chronic bilateral low back pain without sciatica 8/15/2017   • Disease of thyroid gland     SUBCLINICAL HYPOTHYROIDISM   • GERD (gastroesophageal reflux disease)    • Hx of Vincenzo Mountain spotted fever    • Hyperlipidemia    • Hypertension    • Menopause 8/15/2017   • Osteopenia    • Routine adult health maintenance 8/15/2017   • Screening mammogram, encounter for 2017   • Supraventricular tachycardia (CMS/HCC)         Past Surgical History:   Procedure Laterality Date   • BREAST BIOPSY Left     benign   • BREAST SURGERY     • CARDIAC ABLATION  2009    Karthik Hernandez at [facility]   • FRACTURE SURGERY      [facility]   • TONSILLECTOMY     • UTERINE FIBROID SURGERY         PT Ortho     Row Name 19 1000       Subjective Comments    Subjective Comments  Patient reports that she did not have the pain she had following last traction.  She states that she has been sitting mostly over the last few days.  She states that she does seem to have less pain in upper part of her left leg but continues to have numbness into left foot   -SP      User Key  (r) = Recorded By, (t) = Taken By, (c) = Cosigned By    Initials Name Provider Type    Jessica Grant, PT Physical Therapist                      PT Assessment/Plan     Row Name 19 7813          PT Assessment    Assessment Comments  Patient with no increased symptoms following  traction.  She demonstrates good compliance with HEP   -SP        PT Plan    PT Plan Comments  Continue to assess tolerance for traction and progress trunk stabilization   -SP       User Key  (r) = Recorded By, (t) = Taken By, (c) = Cosigned By    Initials Name Provider Type    Jessica Grant, PT Physical Therapist          Modalities     Row Name 07/29/19 1000 07/29/19 0100          Moist Heat    MH Applied  Yes  -SP  --     Location  L/S area  -SP  --     Rx Minutes  12 mins  -SP  --     MH Prior to Rx  Yes  -SP  --        Traction 78572    Traction Type  Lumbar  -SP  Lumbar  -SP     Rx Minutes  15  -SP  15  -SP     Position  Hook-lying  -SP  Hook-lying  -SP     Weight  60  -SP  65  -SP     Hold  60  -SP  60  -SP     Relax  20  -SP  20  -SP       User Key  (r) = Recorded By, (t) = Taken By, (c) = Cosigned By    Initials Name Provider Type    Jessica Grant, PT Physical Therapist        Exercises     Row Name 07/29/19 1000             Subjective Comments    Subjective Comments  Patient reports that she did not have the pain she had following last traction.  She states that she has been sitting mostly over the last few days.  She states that she does seem to have less pain in upper part of her left leg but continues to have numbness into left foot   -SP         Exercise 1    Exercise Name 1  DKTC  -SP      Cueing 1  Verbal;Tactile  -SP      Reps 1  3  -SP      Time 1  20 sec  -SP         Exercise 2    Exercise Name 2  Piriformis stretch  -SP      Reps 2  3  -SP      Time 2  20 sec  -SP         Exercise 3    Exercise Name 3  hamstring stretch  -SP      Reps 3  3  -SP      Time 3  20 sec  -SP         Exercise 4    Exercise Name 4  PPT  -SP      Reps 4  15  -SP      Time 4  5 sec  -SP         Exercise 5    Exercise Name 5  PPT with ball squeeze  -SP      Reps 5  15  -SP      Time 5  5 sec  -SP        User Key  (r) = Recorded By, (t) = Taken By, (c) = Cosigned By    Initials Name Provider Type     Jessica Grant, PT Physical Therapist                           Therapy Education  Given: HEP  Program: Progressed  How Provided: Verbal  Provided to: Patient  Level of Understanding: Verbalized, Demonstrated              Time Calculation:   Start Time: 1000  Stop Time: 1111  Time Calculation (min): 71 min  Therapy Charges for Today     Code Description Service Date Service Provider Modifiers Qty    00738026015 HC PT TRACTION LUMBAR 7/29/2019 Jessica Leal, PT GP 1    [id number]  PT THER PROC EA 15 MIN 7/29/2019 Jessica Leal, PT GP 1                    Jessica Leal, PT  7/29/2019

## 2019-07-31 ENCOUNTER — HOSPITAL ENCOUNTER (OUTPATIENT)
Dept: PHYSICAL THERAPY | Facility: HOSPITAL | Age: 64
Setting detail: THERAPIES SERIES
Discharge: HOME | End: 2019-07-31

## 2019-07-31 DIAGNOSIS — M54.32 SCIATICA OF LEFT SIDE: Primary | ICD-10-CM

## 2019-07-31 PROCEDURE — 97110 THERAPEUTIC EXERCISES: CPT | Performed by: PHYSICAL THERAPIST

## 2019-07-31 PROCEDURE — 97012 MECHANICAL TRACTION THERAPY: CPT | Performed by: PHYSICAL THERAPIST

## 2019-08-06 ENCOUNTER — HOSPITAL ENCOUNTER (OUTPATIENT)
Dept: PHYSICAL THERAPY | Facility: HOSPITAL | Age: 64
Setting detail: THERAPIES SERIES
Discharge: HOME | End: 2019-08-06

## 2019-08-06 DIAGNOSIS — M54.32 SCIATICA OF LEFT SIDE: Primary | ICD-10-CM

## 2019-08-06 PROCEDURE — 97110 THERAPEUTIC EXERCISES: CPT | Performed by: PHYSICAL THERAPIST

## 2019-08-06 PROCEDURE — 97012 MECHANICAL TRACTION THERAPY: CPT | Performed by: PHYSICAL THERAPIST

## 2019-08-06 NOTE — THERAPY TREATMENT NOTE
Outpatient Physical Therapy Ortho Treatment Note  RADHA Munguia     Patient Name: Tierney Rolon  : 1955  MRN: [id number]  Today's Date: 2019      Visit Date: 2019    Visit Dx:    ICD-10-CM ICD-9-CM   1. Sciatica of left side M54.32 724.3       Patient Active Problem List   Diagnosis   • Lymphocytic colitis   • Hypertension   • Hyperlipidemia   • Left bundle branch block (LBBB)   • Osteopenia   • Acquired hypothyroidism   • Chronic bilateral low back pain without sciatica   • Post-menopausal        Past Medical History:   Diagnosis Date   • Chronic bilateral low back pain without sciatica 8/15/2017   • Disease of thyroid gland     SUBCLINICAL HYPOTHYROIDISM   • GERD (gastroesophageal reflux disease)    • Hx of Vincenzo Mountain spotted fever    • Hyperlipidemia    • Hypertension    • Menopause 8/15/2017   • Osteopenia    • Routine adult health maintenance 8/15/2017   • Screening mammogram, encounter for 2017   • Supraventricular tachycardia (CMS/HCC)         Past Surgical History:   Procedure Laterality Date   • BREAST BIOPSY Left     benign   • BREAST SURGERY     • CARDIAC ABLATION  2009    Karthik Hernandez at [facility]   • FRACTURE SURGERY      [facility]   • TONSILLECTOMY     • UTERINE FIBROID SURGERY         PT Ortho     Row Name 19 1100       Subjective Comments    Subjective Comments  Patient reports that she has not noticed any significant change in symptoms since initiation of symptoms and is frustrated that her symptoms persist.   -SP      User Key  (r) = Recorded By, (t) = Taken By, (c) = Cosigned By    Initials Name Provider Type    Jessica Grant, PT Physical Therapist                      PT Assessment/Plan     Row Name 19 1252          PT Assessment    Assessment Comments  Patient tolerates ther-ex and traction without difficulty.  No significant overall change in symptoms  -SP        PT Plan    PT Plan Comments  Try 1-2 more visits for traction.   If symptoms persist may need referral  -SP       User Key  (r) = Recorded By, (t) = Taken By, (c) = Cosigned By    Initials Name Provider Type    Jessica Grant, PT Physical Therapist          Modalities     Row Name 08/06/19 1100             Moist Heat    MH Applied  Yes  -SP      Location  L/S area  -SP      Rx Minutes  12 mins  -SP      MH Prior to Rx  Yes  -SP         Traction 07926    Traction Type  Lumbar  -SP      Rx Minutes  15  -SP      Position  Hook-lying  -SP      Weight  65  -SP      Hold  60  -SP      Relax  20  -SP        User Key  (r) = Recorded By, (t) = Taken By, (c) = Cosigned By    Initials Name Provider Type    Jessica Grant, PT Physical Therapist        Exercises     Row Name 08/06/19 1100             Subjective Comments    Subjective Comments  Patient reports that she has not noticed any significant change in symptoms since initiation of symptoms and is frustrated that her symptoms persist.   -SP         Exercise 1    Exercise Name 1  DKTC  -SP      Cueing 1  Verbal;Tactile  -SP      Reps 1  3  -SP      Time 1  20 sec  -SP         Exercise 2    Exercise Name 2  Piriformis stretch  -SP      Reps 2  3  -SP      Time 2  20 sec  -SP         Exercise 3    Exercise Name 3  hamstring stretch  -SP      Reps 3  3  -SP      Time 3  20 sec  -SP         Exercise 4    Exercise Name 4  PPT  -SP      Reps 4  15  -SP      Time 4  5 sec  -SP         Exercise 5    Exercise Name 5  PPT with ball squeeze  -SP      Reps 5  15  -SP      Time 5  5 sec  -SP         Exercise 6    Exercise Name 6  PPT with BKFO  -SP      Cueing 6  Verbal;Tactile  -SP      Reps 6  15  -SP      Time 6  black tband  -SP        User Key  (r) = Recorded By, (t) = Taken By, (c) = Cosigned By    Initials Name Provider Type    Jessica Grant, PT Physical Therapist                           Therapy Education  Given: HEP  Program: Reinforced  How Provided: Verbal  Provided to: Patient  Level of  Understanding: Verbalized              Time Calculation:   Start Time: 1100  Stop Time: 1207  Time Calculation (min): 67 min  Therapy Charges for Today     Code Description Service Date Service Provider Modifiers Qty    96175443425  PT TRACTION LUMBAR 8/6/2019 Jessica Leal, PT GP 1    [id number]  PT THER PROC EA 15 MIN 8/6/2019 Jessica Leal, PT GP 1                    Jessica Leal, PT  8/6/2019

## 2019-08-07 ENCOUNTER — TELEPHONE (OUTPATIENT)
Dept: INTERNAL MEDICINE | Facility: CLINIC | Age: 64
End: 2019-08-07

## 2019-08-07 DIAGNOSIS — M54.32 SCIATICA OF LEFT SIDE: Primary | ICD-10-CM

## 2019-08-07 NOTE — TELEPHONE ENCOUNTER
----- Message from Lien Vargas sent at 8/7/2019 11:58 AM EDT -----  Contact: patient  [facility] pt    Patient called to say that she has had 6 physical therapy visits and has one more to go to, and her therapists told her to call to let dr stoll know that these are not helping at all. She has a physical and lab visit scheduled in September, but does dr stoll want to see her sooner? Patient said that she has not had any imaging done, xrays, etc.    lien

## 2019-08-08 ENCOUNTER — HOSPITAL ENCOUNTER (OUTPATIENT)
Dept: PHYSICAL THERAPY | Facility: HOSPITAL | Age: 64
Setting detail: THERAPIES SERIES
Discharge: HOME | End: 2019-08-08

## 2019-08-08 DIAGNOSIS — M54.32 SCIATICA OF LEFT SIDE: Primary | ICD-10-CM

## 2019-08-08 PROCEDURE — 97012 MECHANICAL TRACTION THERAPY: CPT | Performed by: PHYSICAL THERAPIST

## 2019-08-08 PROCEDURE — 97110 THERAPEUTIC EXERCISES: CPT | Performed by: PHYSICAL THERAPIST

## 2019-08-08 NOTE — THERAPY TREATMENT NOTE
Outpatient Physical Therapy Ortho Treatment Note  RADHA Munguia     Patient Name: Tierney Rolon  : 1955  MRN: [id number]  Today's Date: 2019      Visit Date: 2019    Visit Dx:    ICD-10-CM ICD-9-CM   1. Sciatica of left side M54.32 724.3       Patient Active Problem List   Diagnosis   • Lymphocytic colitis   • Hypertension   • Hyperlipidemia   • Left bundle branch block (LBBB)   • Osteopenia   • Acquired hypothyroidism   • Chronic bilateral low back pain without sciatica   • Post-menopausal        Past Medical History:   Diagnosis Date   • Chronic bilateral low back pain without sciatica 8/15/2017   • Disease of thyroid gland     SUBCLINICAL HYPOTHYROIDISM   • GERD (gastroesophageal reflux disease)    • Hx of Vincenzo Mountain spotted fever    • Hyperlipidemia    • Hypertension    • Menopause 8/15/2017   • Osteopenia    • Routine adult health maintenance 8/15/2017   • Screening mammogram, encounter for 2017   • Supraventricular tachycardia (CMS/HCC)         Past Surgical History:   Procedure Laterality Date   • BREAST BIOPSY Left     benign   • BREAST SURGERY     • CARDIAC ABLATION  2009    Karthik Hernandez at [facility]   • FRACTURE SURGERY      [facility]   • TONSILLECTOMY     • UTERINE FIBROID SURGERY         PT Ortho     Row Name 19 1100       Subjective Comments    Subjective Comments  Patient reports that she has had no changes in symptoms with modalities, traction or exercises.  She reports that she continues to have numbness in her foot.   -SP    Row Name 19 1100       Subjective Comments    Subjective Comments  Patient reports that she has not noticed any significant change in symptoms since initiation of symptoms and is frustrated that her symptoms persist.   -SP      User Key  (r) = Recorded By, (t) = Taken By, (c) = Cosigned By    Initials Name Provider Type    Jessica Grant, PT Physical Therapist                      PT Assessment/Plan     Row Name  08/08/19 1251          PT Assessment    Assessment Comments  Patient has consistently reports no changes in radicular type symptoms with modalities, traction or therapuetic exercise.  She continues to have back, hip area pain and numbness in her foot unchanged with PT at this point  -SP        PT Plan    PT Plan Comments  Patient may need further testing or referral as she has reported no changes in symptoms with PT at this point.    -SP       User Key  (r) = Recorded By, (t) = Taken By, (c) = Cosigned By    Initials Name Provider Type    Jessica Grant, PT Physical Therapist          Modalities     Row Name 08/08/19 1100             Moist Heat    MH Applied  Yes  -SP      Location  L/S area  -SP      Rx Minutes  12 mins  -SP      MH Prior to Rx  Yes  -SP         Traction 81685    Traction Type  Lumbar  -SP      Rx Minutes  15  -SP      Position  Hook-lying  -SP      Weight  67  -SP      Hold  60  -SP      Relax  20  -SP        User Key  (r) = Recorded By, (t) = Taken By, (c) = Cosigned By    Initials Name Provider Type    Jessica Grant, PT Physical Therapist        Exercises     Row Name 08/08/19 1100             Subjective Comments    Subjective Comments  Patient reports that she has had no changes in symptoms with modalities, traction or exercises.  She reports that she continues to have numbness in her foot.   -SP         Exercise 1    Exercise Name 1  DKTC  -SP      Cueing 1  Verbal;Tactile  -SP      Reps 1  3  -SP      Time 1  20 sec  -SP         Exercise 2    Exercise Name 2  Piriformis stretch  -SP      Reps 2  3  -SP      Time 2  20 sec  -SP         Exercise 3    Exercise Name 3  hamstring stretch  -SP      Reps 3  3  -SP      Time 3  20 sec  -SP         Exercise 4    Exercise Name 4  PPT  -SP      Reps 4  15  -SP      Time 4  5 sec  -SP         Exercise 5    Exercise Name 5  PPT with ball squeeze  -SP      Reps 5  15  -SP      Time 5  5 sec  -SP         Exercise 6    Exercise  Name 6  PPT with BKFO  -SP      Cueing 6  Verbal;Tactile  -SP      Reps 6  15  -SP      Time 6  black tband  -SP        User Key  (r) = Recorded By, (t) = Taken By, (c) = Cosigned By    Initials Name Provider Type    Jessica Grant, PT Physical Therapist                       PT OP Goals     Row Name 08/08/19 1100          PT Short Term Goals    STG Date to Achieve  08/17/19  -SP     STG 1  Patient reports decreased radicular type symptoms into left LE by 25%  -SP     STG 1 Progress  Not Met  -SP     STG 2  Patient instructed in and demonstrates appropriate technique with lifting floor to waist level <5 lbs  -SP     STG 3  Patient reports demonstrates appropriate technique with transfers sup/sit  -SP     STG 3 Progress  Met  -SP        Long Term Goals    LTG Date to Achieve  08/17/19  -SP     LTG 1  Patient demonstrates increased trunk strength by one muscle grade to better tolerate ADLs  -SP     LTG 1 Progress  Met  -SP     LTG 2  Patient reports improved tolerance for weight bearing activity >60 min with pain level <2/10 at worst  -SP     LTG 2 Progress  Not Met  -SP     LTG 3  Patient independent with HEP  -SP     LTG 3 Progress  Met  -SP       User Key  (r) = Recorded By, (t) = Taken By, (c) = Cosigned By    Initials Name Provider Type    Jessica Grant, PT Physical Therapist          Therapy Education  Given: HEP, Posture/body mechanics  Program: Reinforced  How Provided: Verbal  Provided to: Patient  Level of Understanding: Verbalized, Demonstrated              Time Calculation:   Start Time: 1100  Stop Time: 1205  Time Calculation (min): 65 min  Therapy Charges for Today     Code Description Service Date Service Provider Modifiers Qty    03613858637 HC PT TRACTION LUMBAR 8/8/2019 Jessica Lela, PT GP 1    [id number] HC PT THER PROC EA 15 MIN 8/8/2019 Jessica Leal, PT GP 1                    Jessica Leal PT  8/8/2019

## 2019-08-21 ENCOUNTER — HOSPITAL ENCOUNTER (OUTPATIENT)
Dept: MRI IMAGING | Facility: HOSPITAL | Age: 64
Discharge: HOME | End: 2019-08-21
Admitting: INTERNAL MEDICINE

## 2019-08-21 PROCEDURE — 72148 MRI LUMBAR SPINE W/O DYE: CPT

## 2019-08-22 NOTE — TELEPHONE ENCOUNTER
Arthritis in her back and a small disc bulge. Needs to see neurosurgery and pain management. See if we can get her into see Zara Redd and Dr. Womack group please.

## 2019-08-27 DIAGNOSIS — M54.50 CHRONIC BILATERAL LOW BACK PAIN WITHOUT SCIATICA: Primary | ICD-10-CM

## 2019-08-27 DIAGNOSIS — G89.29 CHRONIC BILATERAL LOW BACK PAIN WITHOUT SCIATICA: Primary | ICD-10-CM

## 2019-09-12 ENCOUNTER — TELEPHONE (OUTPATIENT)
Dept: INTERNAL MEDICINE | Facility: CLINIC | Age: 64
End: 2019-09-12

## 2019-09-12 NOTE — TELEPHONE ENCOUNTER
Regarding: Non-Urgent Medical Question  Contact: [phone]  ----- Message from Qulsar, Generic sent at 9/12/2019  3:58 PM EDT -----    Dr. Galan.  I'm writing this on Thur., Sep 12.  I am meeting with you on Thurs., Sep 19 at 1 p.m.  I wanted to let you know I've been experiencing alternating diarrhea & loose stools since Sun., Aug 25.  I initially tried PeptoBismol for the first week.  It didn't help so I now am using Imodium A-D, as needed.  I haven't needed it every day for the past 2 weeks; however, I have needed it for most days.  And although the dosage is limited to 4 tablets per day, there have been only a handful of days where I have needed 4 tablets.  I have no other related symptoms -- no nausea, fever, headache or anything.  I haven't changed anything in my diet.  I'm wondering if this somehow is related to my lumbar spine issue??  We can discuss it next week.

## 2019-09-12 NOTE — TELEPHONE ENCOUNTER
I don't think this is the cause. She sounds stable and will see her as scheduled. Call me if she needs anything.

## 2019-09-14 LAB
ALBUMIN SERPL-MCNC: 4.9 G/DL (ref 3.5–5.2)
ALBUMIN/GLOB SERPL: 2.1 G/DL
ALP SERPL-CCNC: 75 U/L (ref 39–117)
ALT SERPL-CCNC: 20 U/L (ref 1–33)
APPEARANCE UR: CLEAR
AST SERPL-CCNC: 17 U/L (ref 1–32)
BACTERIA #/AREA URNS HPF: ABNORMAL /HPF
BASOPHILS # BLD AUTO: 0.04 10*3/MM3 (ref 0–0.2)
BASOPHILS NFR BLD AUTO: 0.7 % (ref 0–1.5)
BILIRUB SERPL-MCNC: 0.6 MG/DL (ref 0.2–1.2)
BILIRUB UR QL STRIP: NEGATIVE
BUN SERPL-MCNC: 16 MG/DL (ref 8–23)
BUN/CREAT SERPL: 21.9 (ref 7–25)
CALCIUM SERPL-MCNC: 9.9 MG/DL (ref 8.6–10.5)
CASTS URNS MICRO: ABNORMAL
CASTS URNS QL MICRO: PRESENT /LPF
CHLORIDE SERPL-SCNC: 100 MMOL/L (ref 98–107)
CHOLEST SERPL-MCNC: 202 MG/DL (ref 0–200)
CO2 SERPL-SCNC: 30.1 MMOL/L (ref 22–29)
COLOR UR: YELLOW
CREAT SERPL-MCNC: 0.73 MG/DL (ref 0.57–1)
EOSINOPHIL # BLD AUTO: 0.14 10*3/MM3 (ref 0–0.4)
EOSINOPHIL NFR BLD AUTO: 2.4 % (ref 0.3–6.2)
EPI CELLS #/AREA URNS HPF: ABNORMAL /HPF
ERYTHROCYTE [DISTWIDTH] IN BLOOD BY AUTOMATED COUNT: 12.2 % (ref 12.3–15.4)
GLOBULIN SER CALC-MCNC: 2.3 GM/DL
GLUCOSE SERPL-MCNC: 92 MG/DL (ref 65–99)
GLUCOSE UR QL: NEGATIVE
HCT VFR BLD AUTO: 43.8 % (ref 34–46.6)
HCV AB S/CO SERPL IA: <0.1 S/CO RATIO (ref 0–0.9)
HCV AB SERPL QL IA: NORMAL
HDLC SERPL-MCNC: 77 MG/DL (ref 40–60)
HGB BLD-MCNC: 14.7 G/DL (ref 12–15.9)
HGB UR QL STRIP: NEGATIVE
IMM GRANULOCYTES # BLD AUTO: 0.01 10*3/MM3 (ref 0–0.05)
IMM GRANULOCYTES NFR BLD AUTO: 0.2 % (ref 0–0.5)
KETONES UR QL STRIP: NEGATIVE
LDLC SERPL CALC-MCNC: 104 MG/DL (ref 0–100)
LDLC/HDLC SERPL: 1.35 {RATIO}
LEUKOCYTE ESTERASE UR QL STRIP: NEGATIVE
LYMPHOCYTES # BLD AUTO: 1.6 10*3/MM3 (ref 0.7–3.1)
LYMPHOCYTES NFR BLD AUTO: 27.2 % (ref 19.6–45.3)
MCH RBC QN AUTO: 32.1 PG (ref 26.6–33)
MCHC RBC AUTO-ENTMCNC: 33.6 G/DL (ref 31.5–35.7)
MCV RBC AUTO: 95.6 FL (ref 79–97)
MICRO URNS: NORMAL
MICRO URNS: NORMAL
MONOCYTES # BLD AUTO: 0.34 10*3/MM3 (ref 0.1–0.9)
MONOCYTES NFR BLD AUTO: 5.8 % (ref 5–12)
MUCOUS THREADS URNS QL MICRO: PRESENT /HPF
NEUTROPHILS # BLD AUTO: 3.76 10*3/MM3 (ref 1.7–7)
NEUTROPHILS NFR BLD AUTO: 63.7 % (ref 42.7–76)
NITRITE UR QL STRIP: NEGATIVE
NRBC BLD AUTO-RTO: 0 /100 WBC (ref 0–0.2)
PH UR STRIP: 7.5 [PH] (ref 5–7.5)
PLATELET # BLD AUTO: 247 10*3/MM3 (ref 140–450)
POTASSIUM SERPL-SCNC: 4.1 MMOL/L (ref 3.5–5.2)
PROT SERPL-MCNC: 7.2 G/DL (ref 6–8.5)
PROT UR QL STRIP: NORMAL
RBC # BLD AUTO: 4.58 10*6/MM3 (ref 3.77–5.28)
RBC #/AREA URNS HPF: ABNORMAL /HPF
SODIUM SERPL-SCNC: 144 MMOL/L (ref 136–145)
SP GR UR: 1.02 (ref 1–1.03)
T4 FREE SERPL-MCNC: 1.13 NG/DL (ref 0.93–1.7)
TRIGL SERPL-MCNC: 106 MG/DL (ref 0–150)
TSH SERPL DL<=0.005 MIU/L-ACNC: 2.34 UIU/ML (ref 0.27–4.2)
URINALYSIS REFLEX: NORMAL
UROBILINOGEN UR STRIP-MCNC: 0.2 MG/DL (ref 0.2–1)
VLDLC SERPL CALC-MCNC: 21.2 MG/DL
WBC # BLD AUTO: 5.89 10*3/MM3 (ref 3.4–10.8)
WBC #/AREA URNS HPF: ABNORMAL /HPF

## 2019-09-17 ENCOUNTER — OFFICE VISIT (OUTPATIENT)
Dept: PAIN MEDICINE | Facility: CLINIC | Age: 64
End: 2019-09-17

## 2019-09-17 VITALS
BODY MASS INDEX: 25.4 KG/M2 | OXYGEN SATURATION: 94 % | RESPIRATION RATE: 18 BRPM | WEIGHT: 138 LBS | TEMPERATURE: 98.7 F | SYSTOLIC BLOOD PRESSURE: 141 MMHG | HEART RATE: 91 BPM | HEIGHT: 62 IN | DIASTOLIC BLOOD PRESSURE: 82 MMHG

## 2019-09-17 DIAGNOSIS — M54.42 CHRONIC BILATERAL LOW BACK PAIN WITH LEFT-SIDED SCIATICA: ICD-10-CM

## 2019-09-17 DIAGNOSIS — G89.29 CHRONIC BILATERAL LOW BACK PAIN WITH LEFT-SIDED SCIATICA: ICD-10-CM

## 2019-09-17 DIAGNOSIS — G89.29 OTHER CHRONIC PAIN: Primary | ICD-10-CM

## 2019-09-17 PROCEDURE — 99204 OFFICE O/P NEW MOD 45 MIN: CPT | Performed by: NURSE PRACTITIONER

## 2019-09-17 RX ORDER — NAPROXEN SODIUM 220 MG
220 TABLET ORAL AS NEEDED
COMMUNITY
End: 2020-05-18

## 2019-09-17 RX ORDER — CHOLECALCIFEROL (VITAMIN D3) 125 MCG
5 CAPSULE ORAL NIGHTLY
COMMUNITY
End: 2020-05-18

## 2019-09-17 RX ORDER — LOPERAMIDE HYDROCHLORIDE 2 MG/1
2 CAPSULE ORAL AS NEEDED
COMMUNITY
End: 2019-11-18

## 2019-09-17 NOTE — PATIENT INSTRUCTIONS
Epidural Steroid Injection  An epidural steroid injection is a shot of steroid medicine and numbing medicine that is given into the space between the spinal cord and the bones in your back (epidural space). The shot helps relieve pain caused by an irritated or swollen nerve root.  The amount of pain relief you get from the injection depends on what is causing the nerve to be swollen and irritated, and how long your pain lasts. You are more likely to benefit from this injection if your pain is strong and comes on suddenly rather than if you have had pain for a long time.  Tell a health care provider about:    · Any allergies you have.  · All medicines you are taking, including vitamins, herbs, eye drops, creams, and over-the-counter medicines.  · Any problems you or family members have had with anesthetic medicines.  · Any blood disorders you have.  · Any surgeries you have had.  · Any medical conditions you have.  · Whether you are pregnant or may be pregnant.  What are the risks?  Generally, this is a safe procedure. However, problems may occur, including:  · Headache.  · Bleeding.  · Infection.  · Allergic reaction to medicines.  · Damage to your nerves.  What happens before the procedure?  Staying hydrated  Follow instructions from your health care provider about hydration, which may include:  · Up to 2 hours before the procedure - you may continue to drink clear liquids, such as water, clear fruit juice, black coffee, and plain tea.  Eating and drinking restrictions  Follow instructions from your health care provider about eating and drinking, which may include:  · 8 hours before the procedure - stop eating heavy meals or foods such as meat, fried foods, or fatty foods.  · 6 hours before the procedure - stop eating light meals or foods, such as toast or cereal.  · 6 hours before the procedure - stop drinking milk or drinks that contain milk.  · 2 hours before the procedure - stop drinking clear  liquids.  Medicine  · You may be given medicines to lower anxiety.  · Ask your health care provider about:  ? Changing or stopping your regular medicines. This is especially important if you are taking diabetes medicines or blood thinners.  ? Taking medicines such as aspirin and ibuprofen. These medicines can thin your blood. Do not take these medicines before your procedure if your health care provider instructs you not to.  General instructions  · Plan to have someone take you home from the hospital or clinic.  What happens during the procedure?  · You may receive a medicine to help you relax (sedative).  · You will be asked to lie on your abdomen.  · The injection site will be cleaned.  · A numbing medicine (local anesthetic) will be used to numb the injection site.  · A needle will be inserted through your skin into the epidural space. You may feel some discomfort when this happens. An X-ray machine will be used to make sure the needle is put as close as possible to the affected nerve.  · A steroid medicine and a local anesthetic will be injected into the epidural space.  · The needle will be removed.  · A bandage (dressing) will be put over the injection site.  What happens after the procedure?  · Your blood pressure, heart rate, breathing rate, and blood oxygen level will be monitored until the medicines you were given have worn off.  · Your arm or leg may feel weak or numb for a few hours.  · The injection site may feel sore.  · Do not drive for 24 hours if you received a sedative.  This information is not intended to replace advice given to you by your health care provider. Make sure you discuss any questions you have with your health care provider.  Document Released: 03/26/2009 Document Revised: 09/05/2018 Document Reviewed: 04/04/2017  [employer]vier Interactive Patient Education © 2019 [employer]

## 2019-09-17 NOTE — PROGRESS NOTES
CHIEF COMPLAINT  Ms. Rolon states that her pain in her left hip and leg started in March 2019 with no known cause. She states that she has tried PT for the leg and hip pain but states that it didn't help.    Subjective   Tierney Rolon is a 63 y.o. female.   She presents to the office for evaluation of left hip/leg pain. She was referred here by Dr. Jessica Galan.     Patient reports pain in her left leg and hip for the past 6 months.  Does not attribute this to any specific known cause, incident, trauma.  She has been through physical therapy without benefit.  Primary care provider has also tried a muscle relaxant and NSAID without benefit.  She is referred to our practice to consider interventional pain management options.    C/o pain in the left hip that radiates down the lateral left leg into the left foot.  Aggravated by prolonged standing and walking. Improved with sitting and rest.      She is retired. Living at home with .      Says she took Prednisone for poision ivy, but developed severe bilateral knee pain. Concerned about steroid with injection.      History of Present Illness     PEG Assessment   What number best describes your pain on average in the past week?6  What number best describes how, during the past week, pain has interfered with your enjoyment of life?8  What number best describes how, during the past week, pain has interfered with your general activity?  9      Current Outpatient Medications:   •  aspirin 81 MG tablet, Take  by mouth daily., Disp: , Rfl:   •  atorvastatin (LIPITOR) 10 MG tablet, TAKE 1 TABLET DAILY (NEED TO SCHEDULE AN APPOINTMENT), Disp: 90 tablet, Rfl: 1  •  Coenzyme Q10 (COQ10 GUMMIES ADULT PO), Take 100 mg by mouth., Disp: , Rfl:   •  Cyanocobalamin (VITAMIN B12) 1000 MCG tablet controlled-release, Take 2,000 mcg by mouth Daily., Disp: , Rfl:   •  hydrochlorothiazide (HYDRODIURIL) 25 MG tablet, TAKE 1 TABLET DAILY, Disp: 90 tablet, Rfl: 3  •  levothyroxine  "(SYNTHROID, LEVOTHROID) 25 MCG tablet, Take 1.5 tablets by mouth Daily., Disp: 135 tablet, Rfl: 2  •  loperamide (IMODIUM) 2 MG capsule, Take 2 mg by mouth As Needed for Diarrhea., Disp: , Rfl:   •  melatonin 5 MG tablet tablet, Take 5 mg by mouth Every Night., Disp: , Rfl:   •  naproxen sodium (ALEVE) 220 MG tablet, Take 220 mg by mouth Daily., Disp: , Rfl:   •  Omega-3 Fatty Acids (FISH OIL) 1000 MG capsule capsule, Take 2,000 mg by mouth Daily With Breakfast., Disp: , Rfl:   •  PARoxetine (PAXIL) 10 MG tablet, TAKE 1 TABLET EVERY MORNING, Disp: 90 tablet, Rfl: 1    The following portions of the patient's history were reviewed and updated as appropriate: allergies, current medications, past family history, past medical history, past social history, past surgical history and problem list.    REVIEW OF PERTINENT MEDICAL DATA    Patient is referred to our practice by Dr. Jessica Galan for left-sided low back pain with sciatica.  Primary care provider has tried NSAIDs and physical therapy without benefit.            BECK = 0    Review of Systems   Constitutional: Negative for fatigue.   HENT: Negative for congestion.    Eyes: Negative for visual disturbance.   Respiratory: Negative for cough, shortness of breath and wheezing.    Cardiovascular: Negative.    Gastrointestinal: Positive for diarrhea. Negative for constipation.   Genitourinary: Negative for difficulty urinating.   Musculoskeletal: Positive for arthralgias (left hip).   Neurological: Positive for numbness (toes and left foot). Negative for weakness.   Psychiatric/Behavioral: Positive for sleep disturbance. Negative for suicidal ideas. The patient is nervous/anxious.      Vitals:    09/17/19 1421   BP: 141/82   Pulse: 91   Resp: 18   Temp: 98.7 °F (37.1 °C)   SpO2: 94%   Weight: 62.6 kg (138 lb)   Height: 157.5 cm (62\")   PainSc:   1   PainLoc: Leg     Objective   Physical Exam   Constitutional: She is oriented to person, place, and time. She appears " well-developed and well-nourished. She is cooperative. No distress.   HENT:   Head: Normocephalic and atraumatic.   Right Ear: External ear normal.   Left Ear: External ear normal.   Nose: Nose normal.   Eyes: Conjunctivae, EOM and lids are normal. Pupils are equal, round, and reactive to light.   Neck: Trachea normal and normal range of motion. Neck supple.   Cardiovascular: Normal rate, regular rhythm and normal heart sounds.   Pulmonary/Chest: Effort normal and breath sounds normal. No respiratory distress.   Abdominal: Soft. Normal appearance and bowel sounds are normal. She exhibits no distension. There is no tenderness.   Musculoskeletal: Normal range of motion. She exhibits no deformity.        Lumbar back: She exhibits normal range of motion, no tenderness, no bony tenderness and no pain.   Mildly + SLR LEFT    Neurological: She is alert and oriented to person, place, and time. She has normal strength and normal reflexes. No cranial nerve deficit or sensory deficit. Coordination and gait normal.   Reflex Scores:       Patellar reflexes are 2+ on the right side and 2+ on the left side.       Achilles reflexes are 2+ on the right side and 2+ on the left side.  Skin: Skin is warm, dry and intact. She is not diaphoretic.   Psychiatric: She has a normal mood and affect. Her speech is normal and behavior is normal. Judgment and thought content normal. Cognition and memory are normal.   Nursing note and vitals reviewed.    Assessment/Plan   Tierney was seen today for leg pain.    Diagnoses and all orders for this visit:    Other chronic pain    Chronic bilateral low back pain with left-sided sciatica  -     Case Request      --- left L4 and L5 LTFESI X 2, 2-4. Reviewed the procedure at length with the patient.  Included in the review was expectations, complications, risk and benefits.The procedure was described in detail and the risks, benefits and alternatives were discussed with the patient (including but not  limited to: bleeding, infection, nerve damage, worsening of pain, inability to perform injection, paralysis, seizures, and death) who agreed to proceed.  Discussed the potential for sedation if warranted/wanted.  The procedure will plan to be performed at [facility] with fluoroscopic guidance(unless ultrasound is indicated). Questions were answered and in a way the patient could understand.  Patient verbalized understanding and wishes to proceed.  This intervention will be ordered.  Discussed with patient that all procedures are part of a multimodal plan of care and include either formal PT or a home exercise program.    --- No UDS obtained, no plans for opioid therapy   --- Follow-up after procedure        IRINA REPORT  IRINA report has been reviewed and scanned into the patient's chart.    As the clinician, I personally reviewed the IRINA from 9/17/19 while the patient was in the office today.    EMR Dragon/Transcription disclaimer:   Much of this encounter note is an electronic transcription/translation of spoken language to printed text. The electronic translation of spoken language may permit erroneous, or at times, nonsensical words or phrases to be inadvertently transcribed; Although I have reviewed the note for such errors, some may still exist.

## 2019-09-19 ENCOUNTER — OFFICE VISIT (OUTPATIENT)
Dept: INTERNAL MEDICINE | Facility: CLINIC | Age: 64
End: 2019-09-19

## 2019-09-19 VITALS
HEART RATE: 77 BPM | OXYGEN SATURATION: 99 % | BODY MASS INDEX: 25.03 KG/M2 | WEIGHT: 136 LBS | HEIGHT: 62 IN | TEMPERATURE: 98.5 F | RESPIRATION RATE: 14 BRPM | DIASTOLIC BLOOD PRESSURE: 68 MMHG | SYSTOLIC BLOOD PRESSURE: 130 MMHG

## 2019-09-19 DIAGNOSIS — R19.7 DIARRHEA, UNSPECIFIED TYPE: ICD-10-CM

## 2019-09-19 DIAGNOSIS — Z23 FLU VACCINE NEED: ICD-10-CM

## 2019-09-19 DIAGNOSIS — Z00.00 ENCOUNTER FOR ANNUAL PHYSICAL EXAM: Primary | ICD-10-CM

## 2019-09-19 DIAGNOSIS — E03.9 ACQUIRED HYPOTHYROIDISM: ICD-10-CM

## 2019-09-19 DIAGNOSIS — I10 ESSENTIAL HYPERTENSION: ICD-10-CM

## 2019-09-19 DIAGNOSIS — E78.2 MIXED HYPERLIPIDEMIA: ICD-10-CM

## 2019-09-19 PROCEDURE — 90471 IMMUNIZATION ADMIN: CPT | Performed by: INTERNAL MEDICINE

## 2019-09-19 PROCEDURE — 90674 CCIIV4 VAC NO PRSV 0.5 ML IM: CPT | Performed by: INTERNAL MEDICINE

## 2019-09-19 PROCEDURE — 99396 PREV VISIT EST AGE 40-64: CPT | Performed by: INTERNAL MEDICINE

## 2019-09-19 NOTE — PROGRESS NOTES
"Patient Name: Tierney Rolon    SUBJECTIVE    Tierney is a 63 y.o. female presenting for Annual Exam (CPE, lab results, diarrhea questions)    She is doing well today. She has been seeing Dr. Lemos for her back pain and she is considering epidural.     We reviewed her labs and they look great.    She has had three tick bites since April. She has removed them before they were engorged. Worried about having these tick bites. States it just seems \"unusual\". Wants to contact me if this happens again.     She has normal stools until one month ago. She has been having diarrhea for the last month. She is going about 4 times per day. History of microscopic colitis back in 12/2014. Dr. Reich put her on high dose PeptoBismol that made it better then. Tried Pepto this time and it didn't help. She is having cramping and \"gurgling\" in the lower pelvic area as well. Cramping is relieved with BM. No fever or weight loss. No nausea. No blood in her stool. She has a steroid allergy per her.       Well Adult Physical   Patient here for a comprehensive physical exam.The patient reports problems - diarrhea, tick bites     Do you take any herbs or supplements that were not prescribed by a doctor? no   Are you taking calcium supplements? no   Are you taking aspirin daily? no     History:  LMP: No LMP recorded.  Last pap date: s/p hysterectomy    Tierney Rolon 63 y.o. female who presents for an Annual Wellness Visit.  she has a history of   Patient Active Problem List   Diagnosis   • Lymphocytic colitis   • Hypertension   • Hyperlipidemia   • Left bundle branch block (LBBB)   • Osteopenia   • Acquired hypothyroidism   • Chronic bilateral low back pain without sciatica   • Post-menopausal        Health Habits:  Dental Exam. up to date  Eye Exam. up to date  Exercise: 4 times/week.  Current exercise activities include: rides horses    Mammogram:2/2019  Dexa:3/26/2019  Colonoscopy:12/2014  Tob use:N/A   Qualifies for lung Ca " screening?NA       The following portions of the patient's history were reviewed and updated as appropriate: allergies, current medications, past family history, past medical history, past social history, past surgical history and problem list.    Review of Systems   Constitutional: Negative for chills and fatigue.   HENT: Negative for congestion and ear pain.    Respiratory: Negative for cough and shortness of breath.    Gastrointestinal: Positive for diarrhea. Negative for nausea and vomiting.   Genitourinary: Negative for difficulty urinating and dysuria.   Skin: Positive for rash (itchy rash x 1 day above her tail bone ).   Neurological: Negative for dizziness and headaches.       Prednisone      Current Outpatient Medications:   •  aspirin 81 MG tablet, Take  by mouth daily., Disp: , Rfl:   •  atorvastatin (LIPITOR) 10 MG tablet, TAKE 1 TABLET DAILY (NEED TO SCHEDULE AN APPOINTMENT), Disp: 90 tablet, Rfl: 1  •  Coenzyme Q10 (COQ10 GUMMIES ADULT PO), Take 100 mg by mouth., Disp: , Rfl:   •  Cyanocobalamin (VITAMIN B12) 1000 MCG tablet controlled-release, Take 2,000 mcg by mouth Daily., Disp: , Rfl:   •  hydrochlorothiazide (HYDRODIURIL) 25 MG tablet, TAKE 1 TABLET DAILY, Disp: 90 tablet, Rfl: 3  •  levothyroxine (SYNTHROID, LEVOTHROID) 25 MCG tablet, Take 1.5 tablets by mouth Daily., Disp: 135 tablet, Rfl: 2  •  loperamide (IMODIUM) 2 MG capsule, Take 2 mg by mouth As Needed for Diarrhea., Disp: , Rfl:   •  melatonin 5 MG tablet tablet, Take 5 mg by mouth Every Night., Disp: , Rfl:   •  naproxen sodium (ALEVE) 220 MG tablet, Take 220 mg by mouth Daily., Disp: , Rfl:   •  Omega-3 Fatty Acids (FISH OIL) 1000 MG capsule capsule, Take 2,000 mg by mouth Daily With Breakfast., Disp: , Rfl:   •  PARoxetine (PAXIL) 10 MG tablet, TAKE 1 TABLET EVERY MORNING, Disp: 90 tablet, Rfl: 1    OBJECTIVE    /68 (BP Location: Left arm, Patient Position: Sitting, Cuff Size: Adult)   Pulse 77   Temp 98.5 °F (36.9 °C) (Oral)   " Resp 14   Ht 157.5 cm (62\")   Wt 61.7 kg (136 lb)   SpO2 99%   BMI 24.87 kg/m²     Physical Exam   Constitutional: She is oriented to person, place, and time. She appears well-developed and well-nourished. No distress.   HENT:   Head: Normocephalic and atraumatic.   Right Ear: Hearing, tympanic membrane, external ear and ear canal normal.   Left Ear: Hearing, tympanic membrane, external ear and ear canal normal.   Nose: Nose normal.   Mouth/Throat: Uvula is midline and oropharynx is clear and moist. No oropharyngeal exudate, posterior oropharyngeal edema or posterior oropharyngeal erythema. Tonsils are 0 on the right. Tonsils are 0 on the left. No tonsillar exudate.   Eyes: Conjunctivae are normal. Right eye exhibits no discharge. Left eye exhibits no discharge. No scleral icterus.   Neck: Neck supple.   Cardiovascular: Normal rate, regular rhythm and normal heart sounds. Exam reveals no gallop and no friction rub.   No murmur heard.  Pulmonary/Chest: Effort normal and breath sounds normal. No respiratory distress. She has no wheezes. She has no rales.   Abdominal: Soft. Bowel sounds are normal. She exhibits no distension and no mass. There is no tenderness. There is no guarding.   Lymphadenopathy:     She has no cervical adenopathy.   Neurological: She is alert and oriented to person, place, and time.   Skin: Skin is warm. Lesion (several tick bites on her right side redness/infection ) noted. No rash noted.   Psychiatric: She has a normal mood and affect. Her behavior is normal.   Nursing note and vitals reviewed.          ASSESSMENT AND PLAN  Flu shot today. I discussed preventative counseling with Tierney. I want her to reduce salt in diet and cooking, reduce exposure to stress, improve dietary compliance, continue current medications, continue current healthy lifestyle patterns and return for routine annual checkups.     Her diarrhea seems like IBS vs some overflow constipation vs microscopic colitis. Will " have her start Miralax and call in one week and if not better, will check stool cultures to rule out infection since this has been going on for 4-5 weeks. With history of microscopic colitis, will send to GI if these are negative.     Seems to be extremely worried about these tick bites. Not sure what I can do about them. They are not infected and no signs of RMSF or Lyme at this time.     Tierney was seen today for annual exam.    Diagnoses and all orders for this visit:    Encounter for annual physical exam    Flu vaccine need  -     Flucelvax Quad=>4Years (PFS)    Diarrhea, unspecified type    Essential hypertension    Mixed hyperlipidemia    Acquired hypothyroidism          Return in about 6 months (around 3/19/2020) for Recheck.

## 2019-10-31 ENCOUNTER — OUTSIDE FACILITY SERVICE (OUTPATIENT)
Dept: PAIN MEDICINE | Facility: CLINIC | Age: 64
End: 2019-10-31

## 2019-10-31 ENCOUNTER — DOCUMENTATION (OUTPATIENT)
Dept: PAIN MEDICINE | Facility: CLINIC | Age: 64
End: 2019-10-31

## 2019-10-31 PROCEDURE — 64484 NJX AA&/STRD TFRM EPI L/S EA: CPT | Performed by: ANESTHESIOLOGY

## 2019-10-31 PROCEDURE — 64483 NJX AA&/STRD TFRM EPI L/S 1: CPT | Performed by: ANESTHESIOLOGY

## 2019-11-18 ENCOUNTER — OFFICE VISIT (OUTPATIENT)
Dept: INTERNAL MEDICINE | Facility: CLINIC | Age: 64
End: 2019-11-18

## 2019-11-18 VITALS
HEART RATE: 81 BPM | OXYGEN SATURATION: 97 % | WEIGHT: 137.2 LBS | SYSTOLIC BLOOD PRESSURE: 132 MMHG | RESPIRATION RATE: 16 BRPM | TEMPERATURE: 98.2 F | BODY MASS INDEX: 25.25 KG/M2 | DIASTOLIC BLOOD PRESSURE: 80 MMHG | HEIGHT: 62 IN

## 2019-11-18 DIAGNOSIS — E78.2 MIXED HYPERLIPIDEMIA: ICD-10-CM

## 2019-11-18 DIAGNOSIS — E03.9 ACQUIRED HYPOTHYROIDISM: ICD-10-CM

## 2019-11-18 DIAGNOSIS — N95.1 VASOMOTOR SYMPTOMS DUE TO MENOPAUSE: ICD-10-CM

## 2019-11-18 DIAGNOSIS — M51.36 BULGING OF INTERVERTEBRAL DISC BETWEEN L4 AND L5: ICD-10-CM

## 2019-11-18 DIAGNOSIS — I10 ESSENTIAL HYPERTENSION: Primary | ICD-10-CM

## 2019-11-18 PROBLEM — M51.369 BULGING OF INTERVERTEBRAL DISC BETWEEN L4 AND L5: Status: ACTIVE | Noted: 2017-08-15

## 2019-11-18 PROCEDURE — 99213 OFFICE O/P EST LOW 20 MIN: CPT | Performed by: NURSE PRACTITIONER

## 2019-11-18 RX ORDER — GLUCOSAM/CHON-MSM1/C/MANG/BOSW 750-644 MG
TABLET ORAL
COMMUNITY

## 2019-11-18 NOTE — PROGRESS NOTES
Subjective    Tierney Rolon is a 63 y.o. female presenting today for   Chief Complaint   Patient presents with   • Establish Care   • Hypertension   • Hyperlipidemia       History of Present Illness     Tierney Rolon presents today as a new patient to me to establish care.   Prior PCP was Jessica Galan and Dr. Manjinder Saldana prior to that, and her current/chronic health conditions include:    Patient Active Problem List   Diagnosis   • Hypertension   • Hyperlipidemia   • Left bundle branch block (LBBB)   • Acquired hypothyroidism   • Bulging of intervertebral disc between L4 and L5   • Vasomotor symptoms due to menopause     HTN - well controlled, denies CP, SOB, HA, or dizziness    HLD - kj Lipitor w/o SEs    Hypothyroidism - stable w/ 37.5mcg of Levo, last TSH 2.34 on 9/13/2019    Epidural w/ Dr. Lemos on 10/31, scheduled for #2 on 12/3. Did not see a whole lot of benefit from first but is able to stand for longer than 45 minutes. Bulging disc L4-5 w/ L hip pain and LLE radiculopathy.    Taking Paxil for post-menopausal vasomotor symptoms. She is currently taking 1/2 tablet daily and plans to wean down and then d/c.    New complaints today include: none    The following portions of the patient's history were reviewed and updated as appropriate: allergies, current medications, past family history, past medical history, past social history, past surgical history and problem list.    Review of Systems   Constitutional: Negative for fatigue, fever, unexpected weight gain and unexpected weight loss.   Respiratory: Negative for shortness of breath.    Cardiovascular: Negative for chest pain and palpitations.   Musculoskeletal: Positive for arthralgias.   Neurological: Negative for dizziness and headache.       Objective    Vitals:    11/18/19 1008   BP: 132/80   BP Location: Left arm   Patient Position: Sitting   Cuff Size: Adult   Pulse: 81   Resp: 16   Temp: 98.2 °F (36.8 °C)   TempSrc: Oral   SpO2: 97%   Weight:  "62.2 kg (137 lb 3.2 oz)   Height: 157.5 cm (62\")     Body mass index is 25.09 kg/m².  Nursing notes and vitals reviewed.    Physical Exam   Constitutional: She is oriented to person, place, and time. She appears well-developed and well-nourished. No distress.   HENT:   Right Ear: Hearing, tympanic membrane, external ear and ear canal normal.   Left Ear: Hearing, tympanic membrane, external ear and ear canal normal.   Nose: Nose normal.   Mouth/Throat: Uvula is midline, oropharynx is clear and moist and mucous membranes are normal.   Neck: Neck supple. No thyroid mass and no thyromegaly present.   Cardiovascular: Regular rhythm, S1 normal, S2 normal and normal pulses. Exam reveals no gallop and no friction rub.   No murmur heard.  Pulmonary/Chest: Effort normal and breath sounds normal. She has no wheezes. She has no rhonchi. She has no rales.   Lymphadenopathy:     She has no cervical adenopathy.   Neurological: She is alert and oriented to person, place, and time. No cranial nerve deficit or sensory deficit.   Psychiatric: She has a normal mood and affect. Her speech is normal and behavior is normal. She is attentive.       Assessment and Plan    Tierney was seen today for establish care, hypertension and hyperlipidemia.    Diagnoses and all orders for this visit:    Essential hypertension    Mixed hyperlipidemia    Acquired hypothyroidism    Vasomotor symptoms due to menopause    Continue current medication regimen at current dosages.   Wean from Paxil.  Recommend Prevnar 13. She will check on her insurance benefits and either return for a MA appt or get at the pharm.    Return in about 6 months (around 5/18/2020).      "

## 2019-11-23 ENCOUNTER — RESULTS ENCOUNTER (OUTPATIENT)
Dept: INTERNAL MEDICINE | Facility: CLINIC | Age: 64
End: 2019-11-23

## 2019-11-23 DIAGNOSIS — I10 ESSENTIAL HYPERTENSION: ICD-10-CM

## 2019-11-23 DIAGNOSIS — E78.2 MIXED HYPERLIPIDEMIA: ICD-10-CM

## 2019-11-23 DIAGNOSIS — E03.9 ACQUIRED HYPOTHYROIDISM: ICD-10-CM

## 2019-12-03 ENCOUNTER — DOCUMENTATION (OUTPATIENT)
Dept: PAIN MEDICINE | Facility: CLINIC | Age: 64
End: 2019-12-03

## 2019-12-03 ENCOUNTER — OUTSIDE FACILITY SERVICE (OUTPATIENT)
Dept: PAIN MEDICINE | Facility: CLINIC | Age: 64
End: 2019-12-03

## 2019-12-03 PROCEDURE — 64483 NJX AA&/STRD TFRM EPI L/S 1: CPT | Performed by: ANESTHESIOLOGY

## 2019-12-03 PROCEDURE — 64484 NJX AA&/STRD TFRM EPI L/S EA: CPT | Performed by: ANESTHESIOLOGY

## 2019-12-03 NOTE — PROGRESS NOTES
Lumbar Transforaminal Epidural Steroid Injection (two levels)  [facility]      PREOPERATIVE DIAGNOSIS:  left Lumbar Radiculopathy    POSTOPERATIVE DIAGNOSIS:  Same as preop diagnosis    PROCEDURE:    1. CPT 67996 --  Diagnostic Transforaminal Epidural Steroid Injection at the L4 level, on the left   2. CPT 53011 --  Diagnostic Transforaminal Epidural Steroid Injection at the L5 level, on the left     PRE-PROCEDURE DISCUSSION WITH PATIENT:    Risks and complications were discussed with the patient prior to starting the procedure and informed consent was obtained.  We discussed various topics including but not limited to bleeding, infection, injury, nerve injury, paralysis, coma, death, postprocedural painful flare-up, postprocedural site soreness, and a lack of pain relief.  We discussed the diagnostic aspect of transforaminal epidural / selective nerve root blockade.    SURGEON:  Hung Lemos MD    REASON FOR PROCEDURE:    Previous diagnostic positivity from injection at same location, Making some clinical improvement after the previous procedure. and Radicular pain pattern seems consistent with this dermatome.    SEDATION:  Versed 4mg & Fentanyl 50 mcg IV  ANESTHETIC:  Marcaine 0.25%  STEROID:  Methylprednisolone (DEPO MEDROL) 80mg/ml    DESCRIPTON OF PROCEDURE:  After obtaining informed consent, an I.V. was started in the preoperative area. The patient taken to the operating room and was placed in the prone position with a pillow under the abdomen.  All pressure points were well padded.  EKG, blood pressure, and pulse oximeter were monitored.  The lumbar area was prepped with Chloraprep and draped in a sterile fashion. Under fluoroscopic guidance in an oblique dimension on the above mentioned side, the transverse process of the first aforementioned vertebra at the junction of the body at 6 o'clock position was identified. Skin and subcutaneous tissue was anesthetized with 1% lidocaine. A  22-gauge spinal needle was introduced under fluoroscopic guidance at the above junction into the foramen without parasthesias and into the epidural space. After confirming the position of the needle with PA, lateral, and oblique fluoroscopic views, aspiration was checked and was clear of blood or CSF.  Next, 1 mL of Omnipaque was injected. After seeing adequate spread on the corresponding nerve root, a total volume 2mL of injectate containing local anesthetic as above and half of the above mentioned corticosteroid was injected into the epidural space.  The needle was removed intact.      Next, in similar fashion, the second level mentioned above was addressed and a similar amount of injectate was delivered after similar imaging was achieved.      Vital signs were stable throughout.          ESTIMATED BLOOD LOSS:  <5 mL  SPECIMENS:  none    COMPLICATIONS:   No complications were noted., There was no indication of vascular uptake on live injection of contrast dye., There was no indication of intrathecal uptake on live injection of contrast dye., There was not any evidence of dural puncture.   and The patient did not have any signs of postprocedure numbness nor weakness.    TOLERANCE & DISCHARGE CONDITION:    The patient tolerated the procedure well.  The patient was transported to the recovery area without difficulties.  The patient was discharged to home under the care of family in stable and satisfactory condition.    PLAN OF CARE:  1. The patient was given our standard instruction sheet.  2. The patient will Return to clinic 3-4 wks.  3. The patient will resume all medications as per the medication reconciliation sheet.

## 2019-12-04 ENCOUNTER — PATIENT MESSAGE (OUTPATIENT)
Dept: INTERNAL MEDICINE | Facility: CLINIC | Age: 64
End: 2019-12-04

## 2019-12-04 DIAGNOSIS — Z78.0 MENOPAUSE: ICD-10-CM

## 2019-12-06 RX ORDER — PAROXETINE 10 MG/1
10 TABLET, FILM COATED ORAL EVERY MORNING
Qty: 90 TABLET | Refills: 1 | Status: SHIPPED | OUTPATIENT
Start: 2019-12-06 | End: 2020-05-18 | Stop reason: SDUPTHER

## 2019-12-06 NOTE — TELEPHONE ENCOUNTER
From: Tierney Rolon  To: Cassie Callahan APRN  Sent: 12/4/2019 1:07 PM EST  Subject: Prescription Question    Good afternoon Cassie. I am sending this to you on Wednesday, 12/04, at 1:07 p.m. My attempt to wean myself off of the Paroxetine (Paxil) is proving to be unsuccessful. My hot flashes are returning, to include waking me up during the night, and they are occurring with greater frequency. If I return to taking one whole tablet daily, I have only 9 days remaining of this medication. Will you please refill it for me? I use [facility] for my medications. Thank you.

## 2020-01-07 ENCOUNTER — OFFICE VISIT (OUTPATIENT)
Dept: PAIN MEDICINE | Facility: CLINIC | Age: 65
End: 2020-01-07

## 2020-01-07 VITALS
DIASTOLIC BLOOD PRESSURE: 82 MMHG | HEIGHT: 62 IN | WEIGHT: 137 LBS | TEMPERATURE: 99.3 F | RESPIRATION RATE: 18 BRPM | BODY MASS INDEX: 25.21 KG/M2 | SYSTOLIC BLOOD PRESSURE: 138 MMHG | OXYGEN SATURATION: 94 % | HEART RATE: 79 BPM

## 2020-01-07 DIAGNOSIS — G89.29 OTHER CHRONIC PAIN: Primary | ICD-10-CM

## 2020-01-07 DIAGNOSIS — M54.42 CHRONIC BILATERAL LOW BACK PAIN WITH LEFT-SIDED SCIATICA: ICD-10-CM

## 2020-01-07 DIAGNOSIS — G89.29 CHRONIC BILATERAL LOW BACK PAIN WITH LEFT-SIDED SCIATICA: ICD-10-CM

## 2020-01-07 PROCEDURE — 99212 OFFICE O/P EST SF 10 MIN: CPT | Performed by: NURSE PRACTITIONER

## 2020-01-07 NOTE — PROGRESS NOTES
CHIEF COMPLAINT  Follow-up for back pain.    Subjective   Tierney Rolon is a 64 y.o. female  who presents to the office for follow-up of procedure.  She completed a Lumbar Transforaminal Epidural Steroid Injection @ left L4,L5   on  10/31/19 and 12/3/19 performed by Dr. Lemos for management of back pain. Patient reports 80% ongoing relief from both the procedures.     Back Pain   This is a chronic problem. The current episode started more than 1 year ago. The problem occurs daily. The problem has been waxing and waning since onset. The pain is present in the lumbar spine. The quality of the pain is described as aching. The pain is at a severity of 0/10. Pertinent negatives include no numbness or weakness. Treatments tried: LTFESI. The treatment provided significant relief.      PEG Assessment   What number best describes your pain on average in the past week?2  What number best describes how, during the past week, pain has interfered with your enjoyment of life?1  What number best describes how, during the past week, pain has interfered with your general activity?  1    The following portions of the patient's history were reviewed and updated as appropriate: allergies, current medications, past family history, past medical history, past social history, past surgical history and problem list.    Review of Systems   Constitutional: Negative for fatigue.   HENT: Negative for congestion.    Eyes: Negative for visual disturbance.   Respiratory: Negative for cough, shortness of breath and wheezing.    Cardiovascular: Negative.    Gastrointestinal: Negative for constipation and diarrhea.   Genitourinary: Negative for difficulty urinating.   Musculoskeletal: Negative for back pain.   Neurological: Negative for weakness and numbness.   Psychiatric/Behavioral: Negative for sleep disturbance and suicidal ideas. The patient is not nervous/anxious.      Vitals:    01/07/20 1358   BP: 138/82   Pulse: 79   Resp: 18   Temp:  "99.3 °F (37.4 °C)   SpO2: 94%   Weight: 62.1 kg (137 lb)   Height: 157.5 cm (62\")   PainSc: 0-No pain     Objective   Physical Exam   Constitutional: She is oriented to person, place, and time. She appears well-developed and well-nourished. No distress.   HENT:   Head: Normocephalic and atraumatic.   Eyes: Conjunctivae and EOM are normal.   Neck: Neck supple.   Cardiovascular: Normal rate.   Pulmonary/Chest: Effort normal. No respiratory distress.   Neurological: She is alert and oriented to person, place, and time.   Skin: Skin is warm and dry. She is not diaphoretic.   Psychiatric: She has a normal mood and affect. Her behavior is normal.   Nursing note and vitals reviewed.    Assessment/Plan   Tierney was seen today for back pain.    Diagnoses and all orders for this visit:    Other chronic pain    Chronic bilateral low back pain with left-sided sciatica      --- Repeat LTFESI as needed  --- Follow-up as needed        IRINA REPORT  IRINA report has been reviewed and scanned into the patient's chart.    As the clinician, I personally reviewed the IRINA from 1/7/2020 while the patient was in the office today.    EMR Dragon/Transcription disclaimer:   Much of this encounter note is an electronic transcription/translation of spoken language to printed text. The electronic translation of spoken language may permit erroneous, or at times, nonsensical words or phrases to be inadvertently transcribed; Although I have reviewed the note for such errors, some may still exist.    "

## 2020-01-08 RX ORDER — ATORVASTATIN CALCIUM 10 MG/1
TABLET, FILM COATED ORAL
Qty: 90 TABLET | Refills: 4 | Status: SHIPPED | OUTPATIENT
Start: 2020-01-08 | End: 2021-04-02

## 2020-01-21 ENCOUNTER — TRANSCRIBE ORDERS (OUTPATIENT)
Dept: ADMINISTRATIVE | Facility: HOSPITAL | Age: 65
End: 2020-01-21

## 2020-01-21 DIAGNOSIS — Z12.31 VISIT FOR SCREENING MAMMOGRAM: Primary | ICD-10-CM

## 2020-01-27 RX ORDER — HYDROCHLOROTHIAZIDE 25 MG/1
TABLET ORAL
Qty: 90 TABLET | Refills: 4 | Status: SHIPPED | OUTPATIENT
Start: 2020-01-27 | End: 2021-04-22

## 2020-02-18 ENCOUNTER — TELEPHONE (OUTPATIENT)
Dept: PAIN MEDICINE | Facility: CLINIC | Age: 65
End: 2020-02-18

## 2020-02-24 ENCOUNTER — OFFICE VISIT (OUTPATIENT)
Dept: PAIN MEDICINE | Facility: CLINIC | Age: 65
End: 2020-02-24

## 2020-02-24 VITALS
DIASTOLIC BLOOD PRESSURE: 80 MMHG | WEIGHT: 138.2 LBS | HEIGHT: 62 IN | HEART RATE: 76 BPM | SYSTOLIC BLOOD PRESSURE: 125 MMHG | TEMPERATURE: 98 F | OXYGEN SATURATION: 95 % | RESPIRATION RATE: 16 BRPM | BODY MASS INDEX: 25.43 KG/M2

## 2020-02-24 DIAGNOSIS — M51.36 BULGING OF INTERVERTEBRAL DISC BETWEEN L4 AND L5: ICD-10-CM

## 2020-02-24 DIAGNOSIS — G89.29 OTHER CHRONIC PAIN: Primary | ICD-10-CM

## 2020-02-24 PROCEDURE — 99214 OFFICE O/P EST MOD 30 MIN: CPT | Performed by: NURSE PRACTITIONER

## 2020-02-24 RX ORDER — GABAPENTIN 100 MG/1
100 CAPSULE ORAL NIGHTLY
Qty: 90 CAPSULE | Refills: 0 | Status: SHIPPED | OUTPATIENT
Start: 2020-02-24 | End: 2020-08-25

## 2020-02-24 NOTE — PROGRESS NOTES
"CHIEF COMPLAINT  F/U back pain- patient states that over the past two weeks she has been having flare-ups that range between 8-9/10. Sh estates that she has to get off her feet and uses a heating pad and then the pain calms down.   Initial evaluation by Rimma Rodriguez   Tierney Rolon is a 64 y.o. female  who presents to the office for follow-up.She has a history of chronic back pain. Reports her pain is fluctuant since last office visit.    Complains of pain in her low back, left buttock and left leg. Today her pain is 1/10VAS. Describes her pain as intermittent sharp and throbbing pain. Pain increases with standing, walking; pain decreases with sitting, laying, heating pad.  Reports she was taking Aleve but \"I canned it because I wasn't getting anything out of it.\" Tried changing to Tylenol over past week or so. \"it might be helping.\" Wears SalonPas patch on left hip, buttock and leg area at night.  Believes this is helping. ADL's by self. Denies any bowel or bladder incontinence.     She completed a Lumbar Transforaminal Epidural Steroid Injection @ left L4,L5   on  10/31/19 and 12/3/19 performed by Dr. Lemos for management of back pain. Patient reports 80% ongoing relief from both the procedures.   Patient reports pain in her left leg and hip for the past 6 months.  Does not attribute this to any specific known cause, incident, trauma.  She has been through physical therapy without benefit.  Primary care provider has also tried a muscle relaxant and NSAID without benefit.  She is referred to our practice to consider interventional pain management options.    Wants to see neurosurgeon in [place] Dr Dhruv Martínez.  Back Pain   This is a chronic problem. The current episode started more than 1 year ago. The problem occurs daily. The problem has been waxing and waning since onset. The pain is present in the lumbar spine. The quality of the pain is described as aching. The pain radiates to the " left knee and left thigh. The pain is at a severity of 1/10. The pain is moderate. The pain is worse during the day. The symptoms are aggravated by bending, position, standing and twisting. Associated symptoms include leg pain and numbness. Pertinent negatives include no abdominal pain, bladder incontinence, bowel incontinence, fever or weakness. Risk factors include sedentary lifestyle. Treatments tried: LTFESI. The treatment provided significant relief.      INDICATION:    Pain radiating from left hip down left leg to foot. With Numbness since March 2019.     TECHNIQUE:   MRI of the lumbar spine without contrast.     COMPARISON:    None available.     FINDINGS:  Sagittal alignment is normal. Bone marrow signal intensity is normal. The conus medullaris terminates at L1-2 and is normal. There is mild multilevel disc desiccation with loss of disc height most apparent at L3-4.     At L1-2, no significant abnormality.     At L2-3, there is a mild concentric disc bulge. There is no canal or foraminal impingement.     At L3-4, there is a concentric disc bulge with more focal components to the right and left side posterolaterally into the foramina. There is mild bilateral inferior foraminal narrowing. There is mild effacement of the anterior thecal sac and mass effect  on the bilateral lateral recesses. There is mild bilateral facet hypertrophy.     At L4-5, there is mild bilateral facet hypertrophy. There is a left posterolateral focal protrusion/small extrusion with mass effect on expected location of the left L5 root in the left lateral recess and left L4 root within the medial foramen. The disc  material measures about 1.1 cm mL dimension 0.3 cm AP dimension and 1.0 cm SI dimension. There is moderate left-sided foraminal narrowing. This mild mass effect on the left anterolateral thecal sac. The right foramen is patent. Please correlate with  radicular symptoms.     At L5-S1, there is mild bilateral facet degenerative  change. There is a left paramedian posterior disc protrusion with small annular fissure. There is no canal stenosis or foraminal compromise.     Incidental noted made of a small S2 level Tarlov cyst.     IMPRESSION:     1. Lumbar degenerative changes most significant appearing radiographically at L4-5 where there is a small left posterolateral protrusion/extrusion with mass effect on expected location of left lateral recess and medial left foramen. Please correlate with  radicular symptoms.  2. No significant lumbar canal stenosis. Milder degenerative changes at L3-4 and L5-S1. Details above.     Signer Name: Millicent Lux MD   Signed: 8/22/2019 9:08 AM   [facility] Name: [email or web address]    [facility]    PEG Assessment   What number best describes your pain on average in the past week?6  What number best describes how, during the past week, pain has interfered with your enjoyment of life?7  What number best describes how, during the past week, pain has interfered with your general activity?  7    The following portions of the patient's history were reviewed and updated as appropriate: allergies, current medications, past family history, past medical history, past social history, past surgical history and problem list.    Review of Systems   Constitutional: Positive for activity change (decreased). Negative for chills, fatigue and fever.   HENT: Negative for congestion.    Eyes: Negative for visual disturbance.   Respiratory: Negative for cough, chest tightness, shortness of breath and wheezing.    Cardiovascular: Negative.    Gastrointestinal: Negative for abdominal pain, bowel incontinence, constipation and diarrhea.   Genitourinary: Negative for bladder incontinence, difficulty urinating and dyspareunia.   Musculoskeletal: Positive for back pain.   Neurological: Positive for numbness. Negative for dizziness, weakness and light-headedness (bilateral legs).   Psychiatric/Behavioral: Positive for  "agitation. Negative for sleep disturbance and suicidal ideas. The patient is not nervous/anxious.        Vitals:    02/24/20 1243   BP: 125/80   Pulse: 76   Resp: 16   Temp: 98 °F (36.7 °C)   SpO2: 95%   Weight: 62.7 kg (138 lb 3.2 oz)   Height: 157.5 cm (62\")   PainSc:   1   PainLoc: Back     Objective   Physical Exam   Constitutional: She is oriented to person, place, and time. She appears well-developed and well-nourished. She is cooperative. No distress.   HENT:   Head: Normocephalic and atraumatic.   Nose: Nose normal.   Eyes: Conjunctivae and lids are normal.   Neck: Trachea normal. Neck supple.   Cardiovascular: Normal rate.   Pulmonary/Chest: Effort normal.   Abdominal: Soft. Normal appearance and bowel sounds are normal. She exhibits no distension. There is no tenderness.   Musculoskeletal: Normal range of motion. She exhibits no deformity.        Lumbar back: She exhibits normal range of motion, no tenderness, no bony tenderness and no pain.   Mildly + SLR LEFT    Neurological: She is alert and oriented to person, place, and time. She has normal strength and normal reflexes. No cranial nerve deficit or sensory deficit. Coordination and gait normal.   Reflex Scores:       Patellar reflexes are 2+ on the right side and 2+ on the left side.       Achilles reflexes are 2+ on the right side and 2+ on the left side.  Skin: Skin is warm, dry and intact.   Psychiatric: She has a normal mood and affect. Her speech is normal and behavior is normal. Judgment and thought content normal. Cognition and memory are normal.   Nursing note and vitals reviewed.      Assessment/Plan   Tierney was seen today for back pain.    Diagnoses and all orders for this visit:    Other chronic pain    Bulging of intervertebral disc between L4 and L5  -     Ambulatory Referral to Neurosurgery  -     Case Request    Other orders  -     gabapentin (NEURONTIN) 100 MG capsule; Take 1 capsule by mouth Every Night.      --- Repeat TF JULIUS Left L4, " L5. No blood thinners. Reviewed the procedure at length with the patient.  Included in the review was expectations, complications, risk and benefits.The procedure was described in detail and the risks, benefits and alternatives were discussed with the patient (including but not limited to: bleeding, infection, nerve damage, worsening of pain, inability to perform injection, paralysis, seizures, and death) who agreed to proceed.  Discussed the potential for sedation if warranted/wanted.  The procedure will plan to be performed at [facility] with fluoroscopic guidance(unless ultrasound is indicated). Questions were answered and in a way the patient could understand.  Patient verbalized understanding and wishes to proceed.  This intervention will be ordered.  Discussed with patient that all procedures are part of a multimodal plan of care and include either formal PT or a home exercise program.  Patient has no evidence of coagulopathy or current infection.  --- Refer to Dr Dhruv Camacho per patient request ([facility])  --- Trial of Gabapentin 100 mg nightly. Discussed medication with the patient.  Included in this discussion was the potential for side effects and adverse events.  Patient verbalized understanding and wished to proceed.  Prescription will be sent to pharmacy.  --- Follow-up after procedure or sooner if needed.       IRINA REPORT  IRINA report has been reviewed and scanned into the patient's chart.    As the clinician, I personally reviewed the IRINA from 2-21-20 while the patient was in the office today.          EMR Dragon/Transcription disclaimer:   Much of this encounter note is an electronic transcription/translation of spoken language to printed text. The electronic translation of spoken language may permit erroneous, or at times, nonsensical words or phrases to be inadvertently transcribed; Although I have reviewed the note for such errors, some may still exist.

## 2020-02-27 ENCOUNTER — HOSPITAL ENCOUNTER (OUTPATIENT)
Dept: MAMMOGRAPHY | Facility: HOSPITAL | Age: 65
Discharge: HOME | End: 2020-02-27
Admitting: NURSE PRACTITIONER

## 2020-02-27 DIAGNOSIS — Z12.31 VISIT FOR SCREENING MAMMOGRAM: ICD-10-CM

## 2020-02-27 PROCEDURE — 77067 SCR MAMMO BI INCL CAD: CPT

## 2020-02-27 PROCEDURE — 77063 BREAST TOMOSYNTHESIS BI: CPT

## 2020-03-02 NOTE — TELEPHONE ENCOUNTER
Phoned pt insurance she is able to have injection phoned pt to schedule had to leave voice message

## 2020-04-07 DIAGNOSIS — E03.9 ACQUIRED HYPOTHYROIDISM: ICD-10-CM

## 2020-04-07 RX ORDER — LEVOTHYROXINE SODIUM 0.03 MG/1
TABLET ORAL
Qty: 135 TABLET | Refills: 3 | Status: SHIPPED | OUTPATIENT
Start: 2020-04-07 | End: 2020-05-18 | Stop reason: DRUGHIGH

## 2020-05-11 LAB
ALBUMIN SERPL-MCNC: 4.9 G/DL (ref 3.5–5.2)
ALBUMIN/GLOB SERPL: 2.5 G/DL
ALP SERPL-CCNC: 86 U/L (ref 39–117)
ALT SERPL-CCNC: 38 U/L (ref 1–33)
AST SERPL-CCNC: 29 U/L (ref 1–32)
BILIRUB SERPL-MCNC: 0.4 MG/DL (ref 0.2–1.2)
BUN SERPL-MCNC: 19 MG/DL (ref 8–23)
BUN/CREAT SERPL: 22.9 (ref 7–25)
CALCIUM SERPL-MCNC: 9.8 MG/DL (ref 8.6–10.5)
CHLORIDE SERPL-SCNC: 100 MMOL/L (ref 98–107)
CHOLEST SERPL-MCNC: 202 MG/DL (ref 0–200)
CHOLEST/HDLC SERPL: 3.42 {RATIO}
CO2 SERPL-SCNC: 29.9 MMOL/L (ref 22–29)
CREAT SERPL-MCNC: 0.83 MG/DL (ref 0.57–1)
GLOBULIN SER CALC-MCNC: 2 GM/DL
GLUCOSE SERPL-MCNC: 97 MG/DL (ref 65–99)
HDLC SERPL-MCNC: 59 MG/DL (ref 40–60)
LDLC SERPL CALC-MCNC: 109 MG/DL (ref 0–100)
POTASSIUM SERPL-SCNC: 4.3 MMOL/L (ref 3.5–5.2)
PROT SERPL-MCNC: 6.9 G/DL (ref 6–8.5)
SODIUM SERPL-SCNC: 141 MMOL/L (ref 136–145)
T4 FREE SERPL-MCNC: 0.97 NG/DL (ref 0.93–1.7)
TRIGL SERPL-MCNC: 169 MG/DL (ref 0–150)
TSH SERPL DL<=0.005 MIU/L-ACNC: 4.37 UIU/ML (ref 0.27–4.2)
VLDLC SERPL CALC-MCNC: 33.8 MG/DL

## 2020-05-18 ENCOUNTER — TELEMEDICINE (OUTPATIENT)
Dept: INTERNAL MEDICINE | Facility: CLINIC | Age: 65
End: 2020-05-18

## 2020-05-18 VITALS
SYSTOLIC BLOOD PRESSURE: 140 MMHG | WEIGHT: 136.6 LBS | HEART RATE: 80 BPM | BODY MASS INDEX: 24.98 KG/M2 | DIASTOLIC BLOOD PRESSURE: 95 MMHG

## 2020-05-18 DIAGNOSIS — Z78.0 MENOPAUSE: ICD-10-CM

## 2020-05-18 DIAGNOSIS — E03.9 ACQUIRED HYPOTHYROIDISM: ICD-10-CM

## 2020-05-18 DIAGNOSIS — I10 ESSENTIAL HYPERTENSION: Primary | ICD-10-CM

## 2020-05-18 DIAGNOSIS — E78.2 MIXED HYPERLIPIDEMIA: ICD-10-CM

## 2020-05-18 PROCEDURE — 99214 OFFICE O/P EST MOD 30 MIN: CPT | Performed by: NURSE PRACTITIONER

## 2020-05-18 RX ORDER — PAROXETINE 10 MG/1
10 TABLET, FILM COATED ORAL EVERY MORNING
Qty: 90 TABLET | Refills: 3 | Status: SHIPPED | OUTPATIENT
Start: 2020-05-18 | End: 2021-04-22

## 2020-05-18 RX ORDER — LEVOTHYROXINE SODIUM 0.05 MG/1
50 TABLET ORAL DAILY
Qty: 90 TABLET | Refills: 1 | Status: SHIPPED | OUTPATIENT
Start: 2020-05-18 | End: 2020-10-27

## 2020-05-18 NOTE — PROGRESS NOTES
Tierney Rolon is a 64 y.o. female presenting today for   Chief Complaint   Patient presents with   • Follow-up   • Hypothyroidism     This visit was conducted via Video Telehealth.      Subjective    History of Present Illness     Patient Active Problem List   Diagnosis   • Hypertension   • Hyperlipidemia   • Left bundle branch block (LBBB)   • Acquired hypothyroidism   • Bulging of intervertebral disc between L4 and L5   • Vasomotor symptoms due to menopause   • Other chronic pain   • Chronic bilateral low back pain with left-sided sciatica       Current Outpatient Medications on File Prior to Visit   Medication Sig   • aspirin 81 MG tablet Take  by mouth daily.   • atorvastatin (LIPITOR) 10 MG tablet TAKE 1 TABLET DAILY (NEED TO SCHEDULE AN APPOINTMENT)   • Coenzyme Q10 (COQ10 GUMMIES ADULT PO) Take 200 mg by mouth.   • Cyanocobalamin (VITAMIN B12) 1000 MCG tablet controlled-release Take 2,000 mcg by mouth Daily.   • gabapentin (NEURONTIN) 100 MG capsule Take 1 capsule by mouth Every Night.   • hydroCHLOROthiazide (HYDRODIURIL) 25 MG tablet TAKE 1 TABLET DAILY   • Misc Natural Products (OSTEO BI-FLEX ADV TRIPLE ST) tablet Take  by mouth.   • Omega-3 Fatty Acids (FISH OIL) 1000 MG capsule capsule Take 2,000 mg by mouth Daily.   • [DISCONTINUED] Biotin 5000 MCG capsule Take  by mouth Daily.   • [DISCONTINUED] levothyroxine (SYNTHROID, LEVOTHROID) 25 MCG tablet TAKE ONE AND ONE-HALF TABLETS DAILY   • [DISCONTINUED] melatonin 5 MG tablet tablet Take 5 mg by mouth Every Night.   • [DISCONTINUED] naproxen sodium (ALEVE) 220 MG tablet Take 220 mg by mouth As Needed.   • [DISCONTINUED] PARoxetine (PAXIL) 10 MG tablet Take 1 tablet by mouth Every Morning.     No current facility-administered medications on file prior to visit.      Chronic health conditions addressed at this visit:    HTN - tolerating medication regimen w/o c/o;does not self monitor; denies CP, SOB, HA, or CP    HLD - tolerating statin w/o c/o; admits to  eating more desserts and drinking more wine with dinner during pandemic quarantine    Hypothyroidism - TSH elevated w/ most recent labs and pt taking biotin much can skew labs    Vasomotor S&S d/t menopause - attempted to wean from Paxil but S&S returned; abated when restarted med    Chronic LBP - 3rd epidural was cancelled d/t pandemic; needs to be rescheduled; PM referred to neurosurgery - Dr. Dhruv Martínez in [place] (has tx'ed numerous family members)        The following portions of the patient's history were reviewed and updated as appropriate: allergies, current medications, problem list, past medical history, past surgical history, family history, and social history.    Review of Systems   Constitutional: Negative.    HENT: Negative.    Eyes: Negative.    Respiratory: Negative.    Cardiovascular: Negative.    Gastrointestinal: Negative.    Endocrine: Negative.    Genitourinary: Negative.    Musculoskeletal: Positive for back pain.   Skin: Negative.    Neurological: Negative.    Hematological: Negative.    Psychiatric/Behavioral: Negative.          Objective  Vitals:    05/18/20 1145   BP: 140/95   Pulse: 80         Physical Exam   Constitutional: She appears well-developed and well-nourished. No distress.   Pulmonary/Chest: Effort normal. No respiratory distress.   Neurological: She is alert. She is not disoriented.   Psychiatric: She has a normal mood and affect. Her speech is normal. She is attentive.       Recent Results (from the past 336 hour(s))   Comprehensive Metabolic Panel    Collection Time: 05/11/20  8:34 AM   Result Value Ref Range    Glucose 97 65 - 99 mg/dL    BUN 19 8 - 23 mg/dL    Creatinine 0.83 0.57 - 1.00 mg/dL    eGFR Non African Am 70 >60 mL/min/1.73    eGFR African Am 84 >60 mL/min/1.73    BUN/Creatinine Ratio 22.9 7.0 - 25.0    Sodium 141 136 - 145 mmol/L    Potassium 4.3 3.5 - 5.2 mmol/L    Chloride 100 98 - 107 mmol/L    Total CO2 29.9 (H) 22.0 - 29.0 mmol/L    Calcium 9.8 8.6 - 10.5  mg/dL    Total Protein 6.9 6.0 - 8.5 g/dL    Albumin 4.90 3.50 - 5.20 g/dL    Globulin 2.0 gm/dL    A/G Ratio 2.5 g/dL    Total Bilirubin 0.4 0.2 - 1.2 mg/dL    Alkaline Phosphatase 86 39 - 117 U/L    AST (SGOT) 29 1 - 32 U/L    ALT (SGPT) 38 (H) 1 - 33 U/L   Lipid Panel With / Chol / HDL Ratio    Collection Time: 05/11/20  8:34 AM   Result Value Ref Range    Total Cholesterol 202 (H) 0 - 200 mg/dL    Triglycerides 169 (H) 0 - 150 mg/dL    HDL Cholesterol 59 40 - 60 mg/dL    VLDL Cholesterol 33.8 mg/dL    LDL Cholesterol  109 (H) 0 - 100 mg/dL    Chol/HDL Ratio 3.42    TSH    Collection Time: 05/11/20  8:34 AM   Result Value Ref Range    TSH 4.370 (H) 0.270 - 4.200 uIU/mL   T4, Free    Collection Time: 05/11/20  8:34 AM   Result Value Ref Range    Free T4 0.97 0.93 - 1.70 ng/dL       Assessment and Plan    Tiernye was seen today for follow-up and hypothyroidism.    Diagnoses and all orders for this visit:    Essential hypertension    Mixed hyperlipidemia    Acquired hypothyroidism  -     levothyroxine (Synthroid) 50 MCG tablet; Take 1 tablet by mouth Daily.    Menopause  -     PARoxetine (PAXIL) 10 MG tablet; Take 1 tablet by mouth Every Morning.      Labs reviewed.  D/C Biotin.  Increase Levothyroxine to 50mcg QD  Check BP daily x2 wks. Send log through [employer].  Decrease desserts and wine consumption.  Paxil refilled.    Except as noted above, pt will continue current medications as noted in the medication list.  Continue to follow with specialty care as directed by them.        Medications, including side effects, were discussed with the patient. Patient verbalized understanding.  The plan of care was discussed. All questions were answered. Patient verbalized understanding.        F/U in 3mo w/ labs.

## 2020-05-23 ENCOUNTER — RESULTS ENCOUNTER (OUTPATIENT)
Dept: INTERNAL MEDICINE | Facility: CLINIC | Age: 65
End: 2020-05-23

## 2020-05-23 DIAGNOSIS — I10 ESSENTIAL HYPERTENSION: ICD-10-CM

## 2020-05-23 DIAGNOSIS — E03.9 ACQUIRED HYPOTHYROIDISM: ICD-10-CM

## 2020-05-23 DIAGNOSIS — E78.2 MIXED HYPERLIPIDEMIA: ICD-10-CM

## 2020-07-27 ENCOUNTER — TRANSCRIBE ORDERS (OUTPATIENT)
Dept: ADMINISTRATIVE | Facility: HOSPITAL | Age: 65
End: 2020-07-27

## 2020-07-27 DIAGNOSIS — M51.16 LUMBAR DISC HERNIATION WITH RADICULOPATHY: Primary | ICD-10-CM

## 2020-08-04 ENCOUNTER — HOSPITAL ENCOUNTER (OUTPATIENT)
Dept: MRI IMAGING | Facility: HOSPITAL | Age: 65
Discharge: HOME | End: 2020-08-04
Admitting: SPECIALIST

## 2020-08-04 DIAGNOSIS — M51.16 LUMBAR DISC HERNIATION WITH RADICULOPATHY: ICD-10-CM

## 2020-08-04 PROCEDURE — 72148 MRI LUMBAR SPINE W/O DYE: CPT

## 2020-08-19 LAB
ALBUMIN SERPL-MCNC: 4.9 G/DL (ref 3.5–5.2)
ALBUMIN/GLOB SERPL: 2.7 G/DL
ALP SERPL-CCNC: 85 U/L (ref 39–117)
ALT SERPL-CCNC: 32 U/L (ref 1–33)
AST SERPL-CCNC: 19 U/L (ref 1–32)
BILIRUB SERPL-MCNC: 0.6 MG/DL (ref 0–1.2)
BUN SERPL-MCNC: 16 MG/DL (ref 8–23)
BUN/CREAT SERPL: 21.9 (ref 7–25)
CALCIUM SERPL-MCNC: 9.7 MG/DL (ref 8.6–10.5)
CHLORIDE SERPL-SCNC: 99 MMOL/L (ref 98–107)
CHOLEST SERPL-MCNC: 197 MG/DL (ref 0–200)
CHOLEST/HDLC SERPL: 3.34 {RATIO}
CO2 SERPL-SCNC: 29.7 MMOL/L (ref 22–29)
CREAT SERPL-MCNC: 0.73 MG/DL (ref 0.57–1)
GLOBULIN SER CALC-MCNC: 1.8 GM/DL
GLUCOSE SERPL-MCNC: 106 MG/DL (ref 65–99)
HDLC SERPL-MCNC: 59 MG/DL (ref 40–60)
LDLC SERPL CALC-MCNC: 111 MG/DL (ref 0–100)
POTASSIUM SERPL-SCNC: 3.9 MMOL/L (ref 3.5–5.2)
PROT SERPL-MCNC: 6.7 G/DL (ref 6–8.5)
SODIUM SERPL-SCNC: 140 MMOL/L (ref 136–145)
T4 FREE SERPL-MCNC: 1.3 NG/DL (ref 0.93–1.7)
TRIGL SERPL-MCNC: 134 MG/DL (ref 0–150)
TSH SERPL DL<=0.005 MIU/L-ACNC: 2.13 UIU/ML (ref 0.27–4.2)
VLDLC SERPL CALC-MCNC: 26.8 MG/DL

## 2020-08-25 ENCOUNTER — OFFICE VISIT (OUTPATIENT)
Dept: INTERNAL MEDICINE | Facility: CLINIC | Age: 65
End: 2020-08-25

## 2020-08-25 VITALS
RESPIRATION RATE: 16 BRPM | WEIGHT: 138 LBS | DIASTOLIC BLOOD PRESSURE: 70 MMHG | BODY MASS INDEX: 25.4 KG/M2 | SYSTOLIC BLOOD PRESSURE: 110 MMHG | HEIGHT: 62 IN | OXYGEN SATURATION: 98 % | TEMPERATURE: 97.1 F | HEART RATE: 84 BPM

## 2020-08-25 DIAGNOSIS — E03.9 ACQUIRED HYPOTHYROIDISM: ICD-10-CM

## 2020-08-25 DIAGNOSIS — R73.01 ELEVATED FASTING GLUCOSE: ICD-10-CM

## 2020-08-25 DIAGNOSIS — I10 ESSENTIAL HYPERTENSION: Primary | ICD-10-CM

## 2020-08-25 DIAGNOSIS — E78.2 MIXED HYPERLIPIDEMIA: ICD-10-CM

## 2020-08-25 DIAGNOSIS — Z00.00 ROUTINE HEALTH MAINTENANCE: ICD-10-CM

## 2020-08-25 DIAGNOSIS — N95.1 VASOMOTOR SYMPTOMS DUE TO MENOPAUSE: ICD-10-CM

## 2020-08-25 DIAGNOSIS — M51.36 BULGING OF INTERVERTEBRAL DISC BETWEEN L4 AND L5: ICD-10-CM

## 2020-08-25 PROCEDURE — 99214 OFFICE O/P EST MOD 30 MIN: CPT | Performed by: NURSE PRACTITIONER

## 2020-08-25 NOTE — PROGRESS NOTES
Subjective   Tierney Rolon is a 64 y.o. female presenting today for follow up of   Chief Complaint   Patient presents with   • Follow-up   • Hypertension   • Hyperlipidemia   • Hypothyroidism       History of Present Illness     Patient Active Problem List   Diagnosis   • Hypertension   • Hyperlipidemia   • Left bundle branch block (LBBB)   • Acquired hypothyroidism   • Bulging of intervertebral disc between L4 and L5   • Vasomotor symptoms due to menopause   • Other chronic pain   • Chronic bilateral low back pain with left-sided sciatica         Current Outpatient Medications:   •  aspirin 81 MG tablet, Take  by mouth daily., Disp: , Rfl:   •  atorvastatin (LIPITOR) 10 MG tablet, TAKE 1 TABLET DAILY (NEED TO SCHEDULE AN APPOINTMENT), Disp: 90 tablet, Rfl: 4  •  Coenzyme Q10 (COQ10 GUMMIES ADULT PO), Take 200 mg by mouth., Disp: , Rfl:   •  Cyanocobalamin (VITAMIN B12) 1000 MCG tablet controlled-release, Take 2,000 mcg by mouth Daily., Disp: , Rfl:   •  hydroCHLOROthiazide (HYDRODIURIL) 25 MG tablet, TAKE 1 TABLET DAILY, Disp: 90 tablet, Rfl: 4  •  levothyroxine (Synthroid) 50 MCG tablet, Take 1 tablet by mouth Daily., Disp: 90 tablet, Rfl: 1  •  Misc Natural Products (OSTEO BI-FLEX ADV TRIPLE ST) tablet, Take  by mouth., Disp: , Rfl:   •  Omega-3 Fatty Acids (FISH OIL) 1000 MG capsule capsule, Take 2,000 mg by mouth Daily., Disp: , Rfl:   •  PARoxetine (PAXIL) 10 MG tablet, Take 1 tablet by mouth Every Morning., Disp: 90 tablet, Rfl: 3     HTN - tolerating medication regimen w/o c/o;does not self monitor; denies CP, SOB, HA, or CP     HLD - tolerating statin w/o c/o; admits to eating more desserts and drinking more wine with dinner during pandemic quarantine     Hypothyroidism - TSH elevated w/ most recent labs and pt taking biotin much can skew labs     Vasomotor S&S d/t menopause - attempted to wean from Paxil but S&S returned; abated when restarted med    Disc herniation L4-5. Dr. Martínez had discussed a  "micro-discectomy. 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    Pt reports she is feeling well overall. She would like to start walking daily.    The following portions of the patient's history were reviewed and updated as appropriate: allergies, current medications, past family history, past medical history, past social history, past surgical history and problem list.    Review of Systems   Constitutional: Negative.    HENT: Negative.    Eyes: Negative.    Respiratory: Negative.    Cardiovascular: Negative.    Gastrointestinal: Negative.    Endocrine: Negative.    Genitourinary: Negative.    Musculoskeletal: Positive for back pain.   Skin: Negative.    Neurological: Negative.    Hematological: Negative.    Psychiatric/Behavioral: Negative.        Objective   Vitals:    08/25/20 0945   BP: 110/70   BP Location: Left arm   Patient Position: Sitting   Cuff Size: Adult   Pulse: 84   Resp: 16   Temp: 97.1 °F (36.2 °C)   TempSrc: Temporal   SpO2: 98%   Weight: 62.6 kg (138 lb)   Height: 157.5 cm (62.01\")     Body mass index is 25.23 kg/m².  Nursing notes and vital reviewed.    Physical Exam   Constitutional: She is oriented to person, place, and time. She appears well-developed and well-nourished. No distress.   Neck: Neck supple. No thyroid mass and no thyromegaly present.   Cardiovascular: Regular rhythm, S1 normal and S2 normal.   Pulmonary/Chest: Effort normal and breath sounds normal.   Lymphadenopathy:     She has no cervical adenopathy.   Neurological: She is alert and oriented to person, place, and time.   Psychiatric: She has a normal mood and affect. Her behavior is normal. Thought content normal. She is attentive.       Recent Results (from the past 672 hour(s))   Comprehensive Metabolic Panel    Collection Time: 08/18/20  8:45 AM   Result Value Ref Range    Glucose 106 (H) 65 - 99 mg/dL    BUN 16 8 - 23 mg/dL    Creatinine 0.73 0.57 - 1.00 mg/dL    eGFR Non African Am 81 >60 mL/min/1.73    " eGFR African Am 98 >60 mL/min/1.73    BUN/Creatinine Ratio 21.9 7.0 - 25.0    Sodium 140 136 - 145 mmol/L    Potassium 3.9 3.5 - 5.2 mmol/L    Chloride 99 98 - 107 mmol/L    Total CO2 29.7 (H) 22.0 - 29.0 mmol/L    Calcium 9.7 8.6 - 10.5 mg/dL    Total Protein 6.7 6.0 - 8.5 g/dL    Albumin 4.90 3.50 - 5.20 g/dL    Globulin 1.8 gm/dL    A/G Ratio 2.7 g/dL    Total Bilirubin 0.6 0.0 - 1.2 mg/dL    Alkaline Phosphatase 85 39 - 117 U/L    AST (SGOT) 19 1 - 32 U/L    ALT (SGPT) 32 1 - 33 U/L   Lipid Panel With / Chol / HDL Ratio    Collection Time: 08/18/20  8:45 AM   Result Value Ref Range    Total Cholesterol 197 0 - 200 mg/dL    Triglycerides 134 0 - 150 mg/dL    HDL Cholesterol 59 40 - 60 mg/dL    VLDL Cholesterol 26.8 mg/dL    LDL Cholesterol  111 (H) 0 - 100 mg/dL    Chol/HDL Ratio 3.34    TSH    Collection Time: 08/18/20  8:45 AM   Result Value Ref Range    TSH 2.130 0.270 - 4.200 uIU/mL   T4, Free    Collection Time: 08/18/20  8:45 AM   Result Value Ref Range    Free T4 1.30 0.93 - 1.70 ng/dL     Each of these lab results were discussed individually in detail with the patient.      Assessment/Plan   Diagnoses and all orders for this visit:    1. Essential hypertension (Primary)  -     CBC & Differential; Future  -     Comprehensive Metabolic Panel; Future    2. Mixed hyperlipidemia  -     CBC & Differential; Future  -     Comprehensive Metabolic Panel; Future  -     Lipid Panel With / Chol / HDL Ratio; Future    3. Acquired hypothyroidism  -     CBC & Differential; Future  -     Comprehensive Metabolic Panel; Future  -     TSH; Future  -     T4, Free; Future    4. Vasomotor symptoms due to menopause    5. Bulging of intervertebral disc between L4 and L5    6. Routine health maintenance  -     CBC & Differential; Future  -     Comprehensive Metabolic Panel; Future    7. Elevated fasting glucose  -     Hemoglobin A1c; Future  -     Urinalysis With Culture If Indicated -; Future  -     Vitamin D 25 Hydroxy;  Future        Pt is doing well. Her chronic health conditions are stable.    Except as noted above, pt will continue current medications as noted in the medication list.    Patient counseled regarding therapeutic lifestyle changes including improved nutrition, increased exercise, and weight loss.        Medications, including side effects, were discussed with the patient. Patient verbalized understanding.  The plan of care was discussed. All questions were answered. Patient verbalized understanding.      Return in about 6 months (around 2/25/2021) for Annual physical.

## 2020-08-30 ENCOUNTER — RESULTS ENCOUNTER (OUTPATIENT)
Dept: INTERNAL MEDICINE | Facility: CLINIC | Age: 65
End: 2020-08-30

## 2020-08-30 DIAGNOSIS — I10 ESSENTIAL HYPERTENSION: ICD-10-CM

## 2020-08-30 DIAGNOSIS — Z00.00 ROUTINE HEALTH MAINTENANCE: ICD-10-CM

## 2020-08-30 DIAGNOSIS — R73.01 ELEVATED FASTING GLUCOSE: ICD-10-CM

## 2020-08-30 DIAGNOSIS — E03.9 ACQUIRED HYPOTHYROIDISM: ICD-10-CM

## 2020-08-30 DIAGNOSIS — E78.2 MIXED HYPERLIPIDEMIA: ICD-10-CM

## 2020-10-27 DIAGNOSIS — E03.9 ACQUIRED HYPOTHYROIDISM: ICD-10-CM

## 2020-10-27 RX ORDER — LEVOTHYROXINE SODIUM 0.05 MG/1
TABLET ORAL
Qty: 90 TABLET | Refills: 3 | Status: SHIPPED | OUTPATIENT
Start: 2020-10-27 | End: 2021-10-22

## 2021-02-03 ENCOUNTER — TRANSCRIBE ORDERS (OUTPATIENT)
Dept: ADMINISTRATIVE | Facility: HOSPITAL | Age: 66
End: 2021-02-03

## 2021-02-03 DIAGNOSIS — Z12.31 VISIT FOR SCREENING MAMMOGRAM: Primary | ICD-10-CM

## 2021-03-02 ENCOUNTER — APPOINTMENT (OUTPATIENT)
Dept: MAMMOGRAPHY | Facility: HOSPITAL | Age: 66
End: 2021-03-02

## 2021-03-05 ENCOUNTER — HOSPITAL ENCOUNTER (OUTPATIENT)
Dept: MAMMOGRAPHY | Facility: HOSPITAL | Age: 66
Discharge: HOME | End: 2021-03-05
Admitting: NURSE PRACTITIONER

## 2021-03-05 DIAGNOSIS — Z12.31 VISIT FOR SCREENING MAMMOGRAM: ICD-10-CM

## 2021-03-05 PROCEDURE — 77063 BREAST TOMOSYNTHESIS BI: CPT

## 2021-03-05 PROCEDURE — 77067 SCR MAMMO BI INCL CAD: CPT

## 2021-04-02 ENCOUNTER — TELEPHONE (OUTPATIENT)
Dept: INTERNAL MEDICINE | Facility: CLINIC | Age: 66
End: 2021-04-02

## 2021-04-02 RX ORDER — ATORVASTATIN CALCIUM 10 MG/1
10 TABLET, FILM COATED ORAL DAILY
Qty: 90 TABLET | Refills: 0 | Status: SHIPPED | OUTPATIENT
Start: 2021-04-02 | End: 2021-06-10 | Stop reason: SDUPTHER

## 2021-04-21 DIAGNOSIS — Z78.0 MENOPAUSE: ICD-10-CM

## 2021-04-22 RX ORDER — HYDROCHLOROTHIAZIDE 25 MG/1
TABLET ORAL
Qty: 90 TABLET | Refills: 3 | Status: SHIPPED | OUTPATIENT
Start: 2021-04-22 | End: 2022-04-18

## 2021-04-22 RX ORDER — PAROXETINE 10 MG/1
TABLET, FILM COATED ORAL
Qty: 90 TABLET | Refills: 3 | Status: SHIPPED | OUTPATIENT
Start: 2021-04-22 | End: 2022-04-18

## 2021-05-07 ENCOUNTER — TELEPHONE (OUTPATIENT)
Dept: INTERNAL MEDICINE | Facility: CLINIC | Age: 66
End: 2021-05-07

## 2021-05-07 NOTE — TELEPHONE ENCOUNTER
PATIENT CALLING TO CHECK IF [facility] HAS SEEN HER [employer] MESSAGE SHE SENT ABOUT 3PM YESTERDAY, SHE ALSO WANTED TO ADD A FEW THINGS TO IT AS FOLLOWS.    EXTREME FATIGUE STARTING ABOUT 2 DAYS AGO, A MILD QUEASY FEELING THAT COMES AND GOES THAT ALSO STARTED 2 DAYS AGO..     NO FEVER  PATIENT HAS HAD BOTH COVID MODERNA VACCINES     PLEASE HAVE ALLISON CALL PATIENT: [phone]

## 2021-05-10 ENCOUNTER — OFFICE VISIT (OUTPATIENT)
Dept: INTERNAL MEDICINE | Facility: CLINIC | Age: 66
End: 2021-05-10

## 2021-05-10 VITALS
HEIGHT: 62 IN | WEIGHT: 139.6 LBS | RESPIRATION RATE: 16 BRPM | TEMPERATURE: 97.1 F | DIASTOLIC BLOOD PRESSURE: 62 MMHG | SYSTOLIC BLOOD PRESSURE: 120 MMHG | HEART RATE: 81 BPM | OXYGEN SATURATION: 98 % | BODY MASS INDEX: 25.69 KG/M2

## 2021-05-10 DIAGNOSIS — M51.36 BULGING OF INTERVERTEBRAL DISC BETWEEN L4 AND L5: Primary | ICD-10-CM

## 2021-05-10 DIAGNOSIS — M54.42 CHRONIC BILATERAL LOW BACK PAIN WITH LEFT-SIDED SCIATICA: ICD-10-CM

## 2021-05-10 DIAGNOSIS — G89.29 CHRONIC BILATERAL LOW BACK PAIN WITH LEFT-SIDED SCIATICA: ICD-10-CM

## 2021-05-10 DIAGNOSIS — R53.83 FATIGUE, UNSPECIFIED TYPE: ICD-10-CM

## 2021-05-10 PROCEDURE — 99214 OFFICE O/P EST MOD 30 MIN: CPT | Performed by: NURSE PRACTITIONER

## 2021-05-10 NOTE — PROGRESS NOTES
Tierney Rolon is a 65 y.o. female presenting today for   Chief Complaint   Patient presents with   • Hip Pain   • Fatigue       Subjective    Hip Pain   The incident occurred more than 1 week ago. Injury mechanism: walking the dog. The pain is present in the left hip and left leg (low back). The pain is at a severity of 9/10. She has tried acetaminophen (salon-pas) for the symptoms.   Fatigue  The current episode started 1 to 4 weeks ago. The problem occurs constantly. The problem has been unchanged. Associated symptoms include fatigue and nausea. Pertinent negatives include no abdominal pain, chest pain, diaphoresis, fever, neck pain, vomiting or weakness.      She has had Gabapentin prescribed by PM in the past and this did not help. PT was not helpful in the past either. She has also had injections in the past and those were a bit more helpful. Dr. Martínez had recommended a micro-discectomy in the past. She declined this at the time b/c she had sold her horses and her pain had declined.    The following portions of the patient's history were reviewed and updated as appropriate: allergies, current medications, problem list, past medical history, past surgical history, family history, and social history.    Review of Systems   Constitutional: Positive for fatigue. Negative for diaphoresis and fever.   Respiratory: Negative for shortness of breath.    Cardiovascular: Negative for chest pain and palpitations.   Gastrointestinal: Positive for nausea. Negative for abdominal pain and vomiting.   Genitourinary: Negative for urinary incontinence.   Musculoskeletal: Positive for back pain. Negative for neck pain.   Neurological: Negative for dizziness, syncope, weakness, light-headedness and confusion.         Objective    Vitals:    05/10/21 1319   BP: 120/62   BP Location: Left arm   Patient Position: Sitting   Cuff Size: Adult   Pulse: 81   Resp: 16   Temp: 97.1 °F (36.2 °C)   TempSrc: Temporal   SpO2: 98%   Weight:  "63.3 kg (139 lb 9.6 oz)   Height: 157.5 cm (62.01\")     Body mass index is 25.53 kg/m².  Nursing notes and vitals reviewed.    Physical Exam  Constitutional:       General: She is not in acute distress.     Appearance: She is well-developed.   Pulmonary:      Effort: Pulmonary effort is normal.   Neurological:      Mental Status: She is alert.   Psychiatric:         Attention and Perception: She is attentive.         Speech: Speech normal.           Assessment and Plan    Diagnoses and all orders for this visit:    1. Bulging of intervertebral disc between L4 and L5 (Primary)  -     Discontinue: diclofenac (VOLTAREN) 50 MG EC tablet; Take 1 tablet by mouth 2 (Two) Times a Day As Needed (back/hip pain).  Dispense: 60 tablet; Refill: 0  -     diclofenac (VOLTAREN) 50 MG EC tablet; Take 1 tablet by mouth 2 (Two) Times a Day As Needed (back/hip pain).  Dispense: 60 tablet; Refill: 0    2. Chronic bilateral low back pain with left-sided sciatica  -     Discontinue: diclofenac (VOLTAREN) 50 MG EC tablet; Take 1 tablet by mouth 2 (Two) Times a Day As Needed (back/hip pain).  Dispense: 60 tablet; Refill: 0  -     diclofenac (VOLTAREN) 50 MG EC tablet; Take 1 tablet by mouth 2 (Two) Times a Day As Needed (back/hip pain).  Dispense: 60 tablet; Refill: 0    3. Fatigue, unspecified type  -     Vitamin B12; Future  -     Folate; Future            Medications, including side effects, were discussed with the patient. Patient verbalized understanding.  The plan of care was discussed. All questions were answered. Patient verbalized understanding.        Return for As Previously Scheduled.  "

## 2021-05-14 LAB
25(OH)D3+25(OH)D2 SERPL-MCNC: 59.2 NG/ML (ref 30–100)
ALBUMIN SERPL-MCNC: 4.9 G/DL (ref 3.5–5.2)
ALBUMIN/GLOB SERPL: 2.5 G/DL
ALP SERPL-CCNC: 84 U/L (ref 39–117)
ALT SERPL-CCNC: 22 U/L (ref 1–33)
APPEARANCE UR: CLEAR
AST SERPL-CCNC: 15 U/L (ref 1–32)
BACTERIA #/AREA URNS HPF: NORMAL /HPF
BASOPHILS # BLD AUTO: 0.06 10*3/MM3 (ref 0–0.2)
BASOPHILS NFR BLD AUTO: 1 % (ref 0–1.5)
BILIRUB SERPL-MCNC: 0.5 MG/DL (ref 0–1.2)
BILIRUB UR QL STRIP: NEGATIVE
BUN SERPL-MCNC: 21 MG/DL (ref 8–23)
BUN/CREAT SERPL: 28.4 (ref 7–25)
CALCIUM SERPL-MCNC: 10.1 MG/DL (ref 8.6–10.5)
CASTS URNS QL MICRO: NORMAL /LPF
CHLORIDE SERPL-SCNC: 102 MMOL/L (ref 98–107)
CHOLEST SERPL-MCNC: 212 MG/DL (ref 0–200)
CHOLEST/HDLC SERPL: 3.03 {RATIO}
CO2 SERPL-SCNC: 29.5 MMOL/L (ref 22–29)
COLOR UR: YELLOW
CREAT SERPL-MCNC: 0.74 MG/DL (ref 0.57–1)
EOSINOPHIL # BLD AUTO: 0.12 10*3/MM3 (ref 0–0.4)
EOSINOPHIL NFR BLD AUTO: 2.1 % (ref 0.3–6.2)
EPI CELLS #/AREA URNS HPF: NORMAL /HPF (ref 0–10)
ERYTHROCYTE [DISTWIDTH] IN BLOOD BY AUTOMATED COUNT: 12.5 % (ref 12.3–15.4)
FOLATE SERPL-MCNC: 10.3 NG/ML (ref 4.78–24.2)
GLOBULIN SER CALC-MCNC: 2 GM/DL
GLUCOSE SERPL-MCNC: 91 MG/DL (ref 65–99)
GLUCOSE UR QL: NEGATIVE
HBA1C MFR BLD: 5.1 % (ref 4.8–5.6)
HCT VFR BLD AUTO: 42.1 % (ref 34–46.6)
HDLC SERPL-MCNC: 70 MG/DL (ref 40–60)
HGB BLD-MCNC: 15.2 G/DL (ref 12–15.9)
HGB UR QL STRIP: NEGATIVE
IMM GRANULOCYTES # BLD AUTO: 0.01 10*3/MM3 (ref 0–0.05)
IMM GRANULOCYTES NFR BLD AUTO: 0.2 % (ref 0–0.5)
KETONES UR QL STRIP: NEGATIVE
LDLC SERPL CALC-MCNC: 118 MG/DL (ref 0–100)
LEUKOCYTE ESTERASE UR QL STRIP: NEGATIVE
LYMPHOCYTES # BLD AUTO: 1.64 10*3/MM3 (ref 0.7–3.1)
LYMPHOCYTES NFR BLD AUTO: 28.2 % (ref 19.6–45.3)
MCH RBC QN AUTO: 35.2 PG (ref 26.6–33)
MCHC RBC AUTO-ENTMCNC: 36.1 G/DL (ref 31.5–35.7)
MCV RBC AUTO: 97.5 FL (ref 79–97)
MICRO URNS: NORMAL
MICRO URNS: NORMAL
MONOCYTES # BLD AUTO: 0.33 10*3/MM3 (ref 0.1–0.9)
MONOCYTES NFR BLD AUTO: 5.7 % (ref 5–12)
NEUTROPHILS # BLD AUTO: 3.66 10*3/MM3 (ref 1.7–7)
NEUTROPHILS NFR BLD AUTO: 62.8 % (ref 42.7–76)
NITRITE UR QL STRIP: NEGATIVE
NRBC BLD AUTO-RTO: 0 /100 WBC (ref 0–0.2)
PH UR STRIP: 5 [PH] (ref 5–7.5)
PLATELET # BLD AUTO: 260 10*3/MM3 (ref 140–450)
POTASSIUM SERPL-SCNC: 4.3 MMOL/L (ref 3.5–5.2)
PROT SERPL-MCNC: 6.9 G/DL (ref 6–8.5)
PROT UR QL STRIP: NEGATIVE
RBC # BLD AUTO: 4.32 10*6/MM3 (ref 3.77–5.28)
RBC #/AREA URNS HPF: NORMAL /HPF (ref 0–2)
SODIUM SERPL-SCNC: 141 MMOL/L (ref 136–145)
SP GR UR: 1.03 (ref 1–1.03)
T4 FREE SERPL-MCNC: 1.38 NG/DL (ref 0.93–1.7)
TRIGL SERPL-MCNC: 138 MG/DL (ref 0–150)
TSH SERPL DL<=0.005 MIU/L-ACNC: 2.07 UIU/ML (ref 0.27–4.2)
URINALYSIS REFLEX: NORMAL
UROBILINOGEN UR STRIP-MCNC: 0.2 MG/DL (ref 0.2–1)
VIT B12 SERPL-MCNC: 1295 PG/ML (ref 211–946)
VLDLC SERPL CALC-MCNC: 24 MG/DL (ref 5–40)
WBC # BLD AUTO: 5.82 10*3/MM3 (ref 3.4–10.8)
WBC #/AREA URNS HPF: NORMAL /HPF (ref 0–5)

## 2021-06-01 ENCOUNTER — PREP FOR SURGERY (OUTPATIENT)
Dept: SURGERY | Facility: SURGERY CENTER | Age: 66
End: 2021-06-01

## 2021-06-01 ENCOUNTER — OFFICE VISIT (OUTPATIENT)
Dept: PAIN MEDICINE | Facility: CLINIC | Age: 66
End: 2021-06-01

## 2021-06-01 VITALS
BODY MASS INDEX: 25.36 KG/M2 | OXYGEN SATURATION: 97 % | TEMPERATURE: 96.4 F | HEART RATE: 84 BPM | DIASTOLIC BLOOD PRESSURE: 90 MMHG | SYSTOLIC BLOOD PRESSURE: 140 MMHG | RESPIRATION RATE: 20 BRPM | WEIGHT: 137.8 LBS | HEIGHT: 62 IN

## 2021-06-01 DIAGNOSIS — M51.36 BULGING OF INTERVERTEBRAL DISC BETWEEN L4 AND L5: Primary | ICD-10-CM

## 2021-06-01 DIAGNOSIS — M51.36 BULGING OF INTERVERTEBRAL DISC BETWEEN L4 AND L5: ICD-10-CM

## 2021-06-01 DIAGNOSIS — G89.29 CHRONIC BILATERAL LOW BACK PAIN WITH LEFT-SIDED SCIATICA: ICD-10-CM

## 2021-06-01 DIAGNOSIS — M54.42 CHRONIC BILATERAL LOW BACK PAIN WITH LEFT-SIDED SCIATICA: ICD-10-CM

## 2021-06-01 DIAGNOSIS — G89.29 OTHER CHRONIC PAIN: Primary | ICD-10-CM

## 2021-06-01 PROCEDURE — 99214 OFFICE O/P EST MOD 30 MIN: CPT | Performed by: NURSE PRACTITIONER

## 2021-06-01 RX ORDER — SODIUM CHLORIDE 0.9 % (FLUSH) 0.9 %
10 SYRINGE (ML) INJECTION EVERY 12 HOURS SCHEDULED
Status: CANCELLED | OUTPATIENT
Start: 2021-06-01

## 2021-06-01 RX ORDER — SODIUM CHLORIDE 0.9 % (FLUSH) 0.9 %
10 SYRINGE (ML) INJECTION AS NEEDED
Status: CANCELLED | OUTPATIENT
Start: 2021-06-01

## 2021-06-01 NOTE — PROGRESS NOTES
"CHIEF COMPLAINT  F/u back pain. Pt sts pain has worsened over the past month.     Subjective   Tierney Rolon is a 65 y.o. female  who presents for follow-up.  She has a history of chronic back/hip/leg pain. Reports her pain has worsened over the past month.    Patient was last evaluated the office on 2/24/2020.  She has a history of chronic back pain.  Previously completed transforaminal epidural steroid injections on the left at L4 and L5 on 10/31/2019 and 12/3/2019.  Was reporting significant long-term relief.  Also requested to see a neurosurgeon in [place].  As of office visit on 2/24/2020, plan was to repeat TF JULIUS left L4 and L5.  Also referred patient to neurosurgeon that patient was requesting.  Also given a trial gabapentin 100 mg nightly.  Plan to follow-up after procedure or sooner if needed.    Reviewed Dr Martínez telemedicine evaluation 7-10-20--patient presents for evaluation.  Father was seen earlier today.  Chief complaint is left leg pain that begins in the left head and extends down the left leg laterally to the third and fourth toes.  Has no back pain.  She can only stand or walk for 45 minutes.  This began in March 2019 with no inciting event.  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  Has had 2 epidural steroid injections, none in some time due to COVID pandemic.  Has had physical therapy without benefit.  Is taking gabapentin 100 mg daily.  She notes an allergy to prednisone which causes \"crushing knee pain.  \".  Previous MRI from August 21, 2019 shows disc herniation IN the left at L4-5.  Plan to update MRI.  If the disc herniation remains, consider left L4-5 microdiscectomy.    Reviewed telephone encounter with Dr Dhruv Martínez 8-12-20--MRI was received.  Shows a sizable left L4-5 herniation.  Can proceed with left L4-5 microdiscectomy.  Patient reports that since being of July, she has sold her horses.  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  She would like to discuss her test results and hold off on surgery.  She is feeling better.  She will contact office if symptoms return.    Reviewed Cassie ROSADO office visit 5-(PCP)--patient seen for hip pain, this includes her left hip and left leg.  Rates the pain level a 9 out of 10.  Has had gabapentin prescribed by pain management in the past with no relief.  PT was not helpful in the past either.  Has also had injections in the past and these were helpful.   Recommended a microdiscectomy in the past.  She declined at that time because she sold her horses and her pain had declined. HOwever, was walking dog and dog pulled her and hurt her back.  Prescribed Diclofenac 50 mg BID.    Approximately 1 month ago, she was working with her 70 lb puppy and \"he yanked me\" on the leash.     Complains of pain in her left low back, left hip and radiates down into left leg (calf).\"It feels the same.\" Is having nearly continuous numbness into 3rd and 4th toes of left foot.  Also has intermittent tingling down into left calf. Pain increases with laying on left side, prolonged walking/standing; pain decreases with rest, heating pad, sitting, salonpas patch. Takes intermittent Tyleonl PM when her pain is bad at night. Pain can interfere with her sleep at night.  Was prescribed Diclofenac 50 mg BID. No relief. No side effects from this. Has stopped taking this. ADL's by self. Denies any bowel or bladder incontinence.     Previously failed Gabapentin.    She did have questions about recent routine lab-work from PCP.    Patient remained masked during entire encounter. No cough present. I donned a mask and eye protection throughout entire visit. Prior to donning mask and eye protection, hand hygiene was performed, as well as when it was doffed.  I was closer than 6 feet, but not for an extended period of time. No obvious exposure to any bodily " fluids.    Back Pain  This is a chronic problem. The current episode started more than 1 year ago. The problem occurs daily. The problem has been waxing and waning (worse over past month) since onset. The pain is present in the lumbar spine. The quality of the pain is described as aching. The pain radiates to the left knee and left thigh. The pain is at a severity of 6/10. The pain is moderate. The pain is worse during the day. The symptoms are aggravated by bending, position, standing and twisting. Associated symptoms include leg pain and numbness (left hip and left leg and left foot). Pertinent negatives include no abdominal pain, bladder incontinence, bowel incontinence, chest pain, fever, headaches or weakness. Risk factors include sedentary lifestyle. Treatments tried: LTFESI. The treatment provided significant relief.      Narrative & Impression   MRI Spine Lumbar WO     INDICATION:    64-year-old female with left hip and lower extremity pain since March 2019. Evaluate for lumbar spine abnormality.     TECHNIQUE:   MRI of the lumbar spine without IV contrast.     COMPARISON:    MRI of the lumbar spine dated 8/21/2019.     FINDINGS:  The T11-S2 vertebrae are visualized. The vertebral bodies are normal in height. There is slight scoliosis, which is convex to the left. This was also present on the prior exam.     The conus terminates at the level of the L1 vertebral body and is unremarkable. The lumbar nerve roots layer normally in the dependent spinal canal.     L1-2: The disc is normal in appearance. The spinal canal and neural foramina are patent.     L2-3: Small annular disc bulge with no significant change. The spinal canal and neural foramina are patent.     L3-4: 3 to 4 mm left posterolateral protruding disc superimposed on milder broad-based disc bulging. No significant change. Mild to moderate left and mild right foraminal stenosis. The spinal canal appears patent.     L4-5: Disc desiccation with a  relatively well-maintained disc height. Left paracentral and posterolateral disc protrusion, which appears slightly decreased in comparison with the prior exam. Moderate left lateral recess stenosis, with mild effacement of  the left L5 nerve root. Mild to moderate left and mild right foraminal stenosis.     Spinal canal appears adequately patent.     L5-S1: Small central and left paracentral disc protrusion with no significant change. The spinal canal and neural foramina are patent.     The surrounding soft tissues, as imaged are unremarkable.        IMPRESSION:  1. At L4-5, there is a left paracentral and posterolateral disc protrusion that is mildly decreased in comparison with the prior exam. Moderate left lateral recess stenosis and mild effacement of the left L5 nerve root. Mild to moderate left and mild  right foraminal stenosis.  2. At L3-4, left posterolateral protruding disc is superimposed on milder annular disc bulging, with no significant change. Mild to moderate left and mild right foraminal stenosis.  3. At L5-S1 there is a small central and left paracentral disc protrusion with no significant change. The spinal canal and neural foramina are patent.  4. Slight scoliosis, convex to the left.     Signer Name: Jeanette Love MD   Signed: 8/4/2020 4:21 PM   Workstation Name: [email or web address]    [facility]         PEG Assessment   What number best describes your pain on average in the past week?6  What number best describes how, during the past week, pain has interfered with your enjoyment of life?6  What number best describes how, during the past week, pain has interfered with your general activity?  6    The following portions of the patient's history were reviewed and updated as appropriate: allergies, current medications, past family history, past medical history, past social history, past surgical history and problem list.    Review of Systems   Constitutional: Positive for  "activity change (dec). Negative for fatigue and fever.   HENT: Negative for congestion.    Eyes: Negative for visual disturbance.   Respiratory: Negative for cough and shortness of breath.    Cardiovascular: Negative for chest pain.   Gastrointestinal: Negative for abdominal pain, bowel incontinence, constipation and diarrhea.   Endocrine: Negative for polyuria.   Genitourinary: Negative for bladder incontinence and difficulty urinating.   Musculoskeletal: Positive for back pain. Negative for gait problem.   Allergic/Immunologic: Negative for immunocompromised state.   Neurological: Positive for numbness (left hip and left leg and left foot). Negative for dizziness, weakness, light-headedness and headaches.   Psychiatric/Behavioral: Positive for sleep disturbance. Negative for agitation and suicidal ideas. The patient is not nervous/anxious.      I have reviewed and confirmed the accuracy of the ROS as documented by the MA/LPN/RN ASHLEE Vaca      Vitals:    06/01/21 1433 06/01/21 1440   BP: 152/89 140/90  Comment: taken manually   BP Location:  Left arm   Patient Position:  Sitting   Pulse: 84    Resp: 20    Temp: 96.4 °F (35.8 °C)    SpO2: 97%    Weight: 62.5 kg (137 lb 12.8 oz)    Height: 157.5 cm (62.01\")    PainSc:   6    PainLoc: Back      Objective   Physical Exam  Constitutional:       Appearance: She is well-developed.   HENT:      Head: Normocephalic and atraumatic.   Musculoskeletal:      Lumbar back: Tenderness present. No bony tenderness. Decreased range of motion. Positive left straight leg raise test. Negative right straight leg raise test.   Neurological:      Mental Status: She is alert.      Gait: Gait abnormal.      Deep Tendon Reflexes:      Reflex Scores:       Patellar reflexes are 1+ on the right side and 1+ on the left side.       Achilles reflexes are 1+ on the right side and 1+ on the left side.  Psychiatric:         Speech: Speech normal.         Behavior: Behavior normal.       "   Thought Content: Thought content normal.         Judgment: Judgment normal.         Assessment/Plan   Diagnoses and all orders for this visit:    1. Other chronic pain (Primary)    2. Chronic bilateral low back pain with left-sided sciatica    3. Bulging of intervertebral disc between L4 and L5      --- TF JULIUS Left L4 and L5. No blood thinners. Reviewed the procedure at length with the patient.  Included in the review was expectations, complications, risk and benefits.The procedure was described in detail and the risks, benefits and alternatives were discussed with the patient (including but not limited to: bleeding, infection, nerve damage, worsening of pain, inability to perform injection, paralysis, seizures, and death) who agreed to proceed.  Discussed the potential for sedation if warranted/wanted.  The procedure will plan to be performed at [facility] with fluoroscopic guidance(unless ultrasound is indicated) and could potentially have steroids and contrast dye used. Questions were answered and in a way the patient could understand.  Patient verbalized understanding and wishes to proceed.  This intervention will be ordered.  Discussed with patient that all procedures are part of a multimodal plan of care and include either formal PT or a home exercise program.  Patient has no evidence of coagulopathy or current infection.    --- Follow-up after procedure or sooner if needed.       IRINA REPORT  IRINA report has been reviewed and scanned into the patient's chart.    As the clinician, I personally reviewed the IRINA from 6-1-21 while the patient was in the office today.            EMR Dragon/Transcription disclaimer:   Much of this encounter note is an electronic transcription/translation of spoken language to printed text. The electronic translation of spoken language may permit erroneous, or at times, nonsensical words or phrases to be inadvertently transcribed; Although I have  reviewed the note for such errors, some may still exist.

## 2021-06-10 ENCOUNTER — OFFICE VISIT (OUTPATIENT)
Dept: INTERNAL MEDICINE | Facility: CLINIC | Age: 66
End: 2021-06-10

## 2021-06-10 ENCOUNTER — TRANSCRIBE ORDERS (OUTPATIENT)
Dept: SURGERY | Facility: SURGERY CENTER | Age: 66
End: 2021-06-10

## 2021-06-10 VITALS
TEMPERATURE: 96.4 F | HEART RATE: 85 BPM | DIASTOLIC BLOOD PRESSURE: 80 MMHG | OXYGEN SATURATION: 98 % | SYSTOLIC BLOOD PRESSURE: 140 MMHG | HEIGHT: 62 IN | BODY MASS INDEX: 25.65 KG/M2 | WEIGHT: 139.4 LBS | RESPIRATION RATE: 18 BRPM

## 2021-06-10 DIAGNOSIS — I10 ESSENTIAL HYPERTENSION: ICD-10-CM

## 2021-06-10 DIAGNOSIS — E03.9 ACQUIRED HYPOTHYROIDISM: ICD-10-CM

## 2021-06-10 DIAGNOSIS — M54.42 CHRONIC BILATERAL LOW BACK PAIN WITH LEFT-SIDED SCIATICA: Primary | ICD-10-CM

## 2021-06-10 DIAGNOSIS — F51.04 PSYCHOPHYSIOLOGICAL INSOMNIA: ICD-10-CM

## 2021-06-10 DIAGNOSIS — E78.2 MIXED HYPERLIPIDEMIA: ICD-10-CM

## 2021-06-10 DIAGNOSIS — Z00.00 ENCOUNTER FOR WELLNESS EXAMINATION IN ADULT: Primary | ICD-10-CM

## 2021-06-10 DIAGNOSIS — G89.29 CHRONIC BILATERAL LOW BACK PAIN WITH LEFT-SIDED SCIATICA: Primary | ICD-10-CM

## 2021-06-10 PROCEDURE — 99396 PREV VISIT EST AGE 40-64: CPT | Performed by: NURSE PRACTITIONER

## 2021-06-10 RX ORDER — ATORVASTATIN CALCIUM 20 MG/1
20 TABLET, FILM COATED ORAL NIGHTLY
Qty: 90 TABLET | Refills: 1 | Status: SHIPPED | OUTPATIENT
Start: 2021-06-10 | End: 2021-10-27

## 2021-06-10 NOTE — PROGRESS NOTES
KIMBERLY Monet is a 65 y.o. female presenting for Annual Exam    Her current/chronic health conditions include:  Patient Active Problem List   Diagnosis   • Hypertension   • Hyperlipidemia   • Left bundle branch block (LBBB)   • Acquired hypothyroidism   • Bulging of intervertebral disc between L4 and L5   • Vasomotor symptoms due to menopause   • Other chronic pain   • Chronic bilateral low back pain with left-sided sciatica       Current Outpatient Medications on File Prior to Visit   Medication Sig   • aspirin 81 MG tablet Take  by mouth daily.   • hydroCHLOROthiazide (HYDRODIURIL) 25 MG tablet TAKE 1 TABLET DAILY   • levothyroxine (SYNTHROID, LEVOTHROID) 50 MCG tablet TAKE 1 TABLET DAILY   • Misc Natural Products (OSTEO BI-FLEX ADV TRIPLE ST) tablet Take  by mouth.   • Omega-3 Fatty Acids (FISH OIL) 1000 MG capsule capsule Take 2,000 mg by mouth Daily.   • PARoxetine (PAXIL) 10 MG tablet TAKE 1 TABLET EVERY MORNING   • Ubiquinol (Qunol CoQ10/Ubiquinol/Zuhair) 100 MG capsule    • [DISCONTINUED] atorvastatin (LIPITOR) 10 MG tablet Take 1 tablet by mouth Daily.     No current facility-administered medications on file prior to visit.     HTN - tolerating medication regimen w/o c/o;does not self monitor; denies CP, SOB, HA, or CP     HLD - tolerating statin and ASA w/o c/o    Thyroid - taking 50mcg of Levothyroxine    New issue today:  Insomnia - chronic for quite some time. She has tried Melatonin and Benadryl.    Health Habits:  Nutrition: eating more things that I know I shouldn't  Exercise: 0 times/week.  Current exercise activities include: She is unable to exercise d/t herniated disc.    Screenings:  Dental: UTD  Eye Exam: UTD  PAP: n/a  Mammogram: UTD  Dexa: due in 2022  Colon Cancer: UTD  Tob use: never          The following portions of the patient's history were reviewed and updated as appropriate: allergies, current medications, problem list, past medical history, past family history, past medical  "history, and past social history.    Review of Systems   Constitutional: Negative.    HENT: Negative.    Eyes: Negative.    Respiratory: Negative.    Cardiovascular: Negative.    Gastrointestinal: Negative.    Endocrine: Negative.    Genitourinary: Negative.    Musculoskeletal: Negative.    Skin: Negative.    Allergic/Immunologic: Negative.    Neurological: Negative.    Hematological: Negative.    Psychiatric/Behavioral: Positive for sleep disturbance.       OBJECTIVE    Vitals:    06/10/21 1501   BP: 140/80   Pulse: 85   Resp: 18   Temp: 96.4 °F (35.8 °C)   TempSrc: Temporal   SpO2: 98%   Weight: 63.2 kg (139 lb 6.4 oz)   Height: 157.5 cm (62.01\")       BP Readings from Last 3 Encounters:   06/10/21 140/80   06/01/21 140/90   05/10/21 120/62        Wt Readings from Last 3 Encounters:   06/10/21 63.2 kg (139 lb 6.4 oz)   06/01/21 62.5 kg (137 lb 12.8 oz)   05/10/21 63.3 kg (139 lb 9.6 oz)        Body mass index is 25.49 kg/m².  Nursing notes and vital signs reviewed.      Physical Exam  Constitutional:       General: She is not in acute distress.     Appearance: Normal appearance. She is well-developed.   HENT:      Head: Normocephalic.      Right Ear: Hearing, tympanic membrane, ear canal and external ear normal.      Left Ear: Hearing, tympanic membrane, ear canal and external ear normal.      Nose: Nose normal. No mucosal edema or rhinorrhea.      Mouth/Throat:      Mouth: Mucous membranes are moist.      Pharynx: Oropharynx is clear. Uvula midline.   Eyes:      General: Lids are normal.      Extraocular Movements: Extraocular movements intact.      Conjunctiva/sclera: Conjunctivae normal.      Pupils: Pupils are equal, round, and reactive to light.   Neck:      Thyroid: No thyroid mass or thyromegaly.   Cardiovascular:      Rate and Rhythm: Regular rhythm.      Pulses: Normal pulses.      Heart sounds: S1 normal and S2 normal. No murmur heard.   No friction rub. No gallop.    Pulmonary:      Effort: Pulmonary " effort is normal.      Breath sounds: Normal breath sounds. No wheezing, rhonchi or rales.   Abdominal:      General: Bowel sounds are normal.      Palpations: Abdomen is soft.      Tenderness: There is no abdominal tenderness. There is no guarding.      Hernia: No hernia is present.   Musculoskeletal:         General: No deformity. Normal range of motion.      Cervical back: Normal range of motion and neck supple.   Lymphadenopathy:      Cervical: No cervical adenopathy.   Skin:     General: Skin is warm and dry.      Findings: No lesion or rash.   Neurological:      General: No focal deficit present.      Mental Status: She is alert and oriented to person, place, and time.      Cranial Nerves: No cranial nerve deficit.      Sensory: No sensory deficit.      Motor: Motor function is intact.      Coordination: Coordination is intact.      Gait: Gait normal.      Deep Tendon Reflexes: Reflexes are normal and symmetric.   Psychiatric:         Attention and Perception: She is attentive.         Mood and Affect: Mood and affect normal.         Speech: Speech normal.         Behavior: Behavior normal.         Thought Content: Thought content normal.         No results found for this or any previous visit (from the past 672 hour(s)).      ASSESSMENT AND PLAN    Diagnoses and all orders for this visit:    1. Encounter for wellness examination in adult (Primary)    2. Essential hypertension  Comments:  - uncontrolled per office checks  - pt sts controlled when she checks at home  - check at home QD x14 days and call w/ readings  Orders:  -     Comprehensive Metabolic Panel; Future    3. Mixed hyperlipidemia  Comments:  - uncontrolled  - increase Lipitor to 20mg  Orders:  -     atorvastatin (LIPITOR) 20 MG tablet; Take 1 tablet by mouth Every Night.  Dispense: 90 tablet; Refill: 1  -     Comprehensive Metabolic Panel; Future  -     Lipid Panel With / Chol / HDL Ratio; Future    4. Acquired hypothyroidism  Comments:  -  controlled  Orders:  -     TSH; Future  -     T4, Free; Future    5. Psychophysiological insomnia  Comments:  - try 10mg of Melatonin        Preventative counseling completed including relevant screenings, appropriate vaccinations, healthy nutrition, and appropriate physical activity.  Patient's Body mass index is 25.49 kg/m². indicating that she is overweight (BMI 25-29.9). Obesity-related health conditions include the following: hypertension and dyslipidemias. Obesity is worsening. BMI is is above average; BMI management plan is completed. We discussed portion control and increasing exercise..          Medications, including side effects, were discussed with the patient. Patient verbalized understanding.  The plan of care was discussed. All questions were answered. Patient verbalized understanding.        Return in about 6 months (around 12/10/2021) for HTN, HLD, thryoid; w/ fasting labs one week prior., or sooner if needed.

## 2021-06-11 ENCOUNTER — TELEPHONE (OUTPATIENT)
Dept: INTERNAL MEDICINE | Facility: CLINIC | Age: 66
End: 2021-06-11

## 2021-06-11 NOTE — TELEPHONE ENCOUNTER
Spoke to pt and she wanted to know what she should do about taking b/p at home due  being out of town and could not take b/p. Advised with cassie ROSADO and she stated that pt could use an automatic cuff for the arm and not a wrist device. Pt stated that she did not have an automatic arm cuff and that she has her  take b/p and he will be out of town for a week and if she could wait a week to start b/p Cassie ROSADO stated that is fine

## 2021-06-11 NOTE — TELEPHONE ENCOUNTER
Please call patient with appropriate suggestions to have BP taken/monitored.  Next week she will not have access to a home BP monitor.

## 2021-06-29 ENCOUNTER — HOSPITAL ENCOUNTER (OUTPATIENT)
Dept: GENERAL RADIOLOGY | Facility: SURGERY CENTER | Age: 66
Setting detail: HOSPITAL OUTPATIENT SURGERY
End: 2021-06-29

## 2021-06-29 ENCOUNTER — HOSPITAL ENCOUNTER (OUTPATIENT)
Facility: SURGERY CENTER | Age: 66
Setting detail: HOSPITAL OUTPATIENT SURGERY
Discharge: HOME | End: 2021-06-29
Attending: ANESTHESIOLOGY | Admitting: ANESTHESIOLOGY

## 2021-06-29 VITALS
OXYGEN SATURATION: 92 % | TEMPERATURE: 97.2 F | DIASTOLIC BLOOD PRESSURE: 72 MMHG | WEIGHT: 139 LBS | HEART RATE: 75 BPM | SYSTOLIC BLOOD PRESSURE: 109 MMHG | HEIGHT: 62 IN | BODY MASS INDEX: 25.58 KG/M2 | RESPIRATION RATE: 17 BRPM

## 2021-06-29 DIAGNOSIS — M51.36 BULGING OF INTERVERTEBRAL DISC BETWEEN L4 AND L5: ICD-10-CM

## 2021-06-29 DIAGNOSIS — M54.42 CHRONIC BILATERAL LOW BACK PAIN WITH LEFT-SIDED SCIATICA: ICD-10-CM

## 2021-06-29 DIAGNOSIS — G89.29 CHRONIC BILATERAL LOW BACK PAIN WITH LEFT-SIDED SCIATICA: ICD-10-CM

## 2021-06-29 PROCEDURE — 64484 NJX AA&/STRD TFRM EPI L/S EA: CPT | Performed by: ANESTHESIOLOGY

## 2021-06-29 PROCEDURE — 64483 NJX AA&/STRD TFRM EPI L/S 1: CPT | Performed by: ANESTHESIOLOGY

## 2021-06-29 PROCEDURE — 25010000002 DEXAMETHASONE SODIUM PHOSPHATE 100 MG/10ML SOLUTION 10 ML VIAL: Performed by: ANESTHESIOLOGY

## 2021-06-29 PROCEDURE — 25010000002 MIDAZOLAM PER 1 MG: Performed by: ANESTHESIOLOGY

## 2021-06-29 PROCEDURE — 25010000002 FENTANYL CITRATE (PF) 50 MCG/ML SOLUTION: Performed by: ANESTHESIOLOGY

## 2021-06-29 PROCEDURE — 0 IOHEXOL 300 MG/ML SOLUTION 10 ML VIAL: Performed by: ANESTHESIOLOGY

## 2021-06-29 PROCEDURE — 3E0R33Z INTRODUCTION OF ANTI-INFLAMMATORY INTO SPINAL CANAL, PERCUTANEOUS APPROACH: ICD-10-PCS | Performed by: ANESTHESIOLOGY

## 2021-06-29 RX ORDER — SODIUM CHLORIDE 0.9 % (FLUSH) 0.9 %
10 SYRINGE (ML) INJECTION AS NEEDED
Status: DISCONTINUED | OUTPATIENT
Start: 2021-06-29 | End: 2021-06-29 | Stop reason: HOSPADM

## 2021-06-29 RX ORDER — SODIUM CHLORIDE 0.9 % (FLUSH) 0.9 %
10 SYRINGE (ML) INJECTION EVERY 12 HOURS SCHEDULED
Status: DISCONTINUED | OUTPATIENT
Start: 2021-06-29 | End: 2021-06-29 | Stop reason: HOSPADM

## 2021-06-29 RX ORDER — MIDAZOLAM HYDROCHLORIDE 1 MG/ML
INJECTION INTRAMUSCULAR; INTRAVENOUS AS NEEDED
Status: DISCONTINUED | OUTPATIENT
Start: 2021-06-29 | End: 2021-06-29 | Stop reason: HOSPADM

## 2021-06-29 RX ORDER — FENTANYL CITRATE 50 UG/ML
INJECTION, SOLUTION INTRAMUSCULAR; INTRAVENOUS AS NEEDED
Status: DISCONTINUED | OUTPATIENT
Start: 2021-06-29 | End: 2021-06-29 | Stop reason: HOSPADM

## 2021-06-29 RX ORDER — CHOLECALCIFEROL (VITAMIN D3) 125 MCG
10 CAPSULE ORAL NIGHTLY PRN
COMMUNITY
End: 2022-12-27

## 2021-07-13 ENCOUNTER — OFFICE VISIT (OUTPATIENT)
Dept: PAIN MEDICINE | Facility: CLINIC | Age: 66
End: 2021-07-13

## 2021-07-13 VITALS
WEIGHT: 138.2 LBS | BODY MASS INDEX: 25.43 KG/M2 | HEIGHT: 62 IN | SYSTOLIC BLOOD PRESSURE: 142 MMHG | RESPIRATION RATE: 16 BRPM | TEMPERATURE: 96.8 F | HEART RATE: 79 BPM | OXYGEN SATURATION: 97 % | DIASTOLIC BLOOD PRESSURE: 89 MMHG

## 2021-07-13 DIAGNOSIS — G89.29 OTHER CHRONIC PAIN: Primary | ICD-10-CM

## 2021-07-13 DIAGNOSIS — M51.36 BULGING OF INTERVERTEBRAL DISC BETWEEN L4 AND L5: ICD-10-CM

## 2021-07-13 DIAGNOSIS — G89.29 CHRONIC BILATERAL LOW BACK PAIN WITH LEFT-SIDED SCIATICA: ICD-10-CM

## 2021-07-13 DIAGNOSIS — M54.42 CHRONIC BILATERAL LOW BACK PAIN WITH LEFT-SIDED SCIATICA: ICD-10-CM

## 2021-07-13 PROCEDURE — 99212 OFFICE O/P EST SF 10 MIN: CPT | Performed by: NURSE PRACTITIONER

## 2021-07-13 NOTE — PROGRESS NOTES
CHIEF COMPLAINT  F/U back pain- transforaminal epidural steroid injection left lumbar 4 and lumbar 5- patient states that her pain was improved 100% for one day.     Subjective   Tierney Rolon is a 65 y.o. female  who presents to the office for follow-up of procedure.  She completed a TF JULIUS Left L4 and L5   on  6-29-21 performed by Dr. METZ for management of LOW BACK AND LEG PAIN. Patient reports % relief from the procedure for 1 day.  She reports she is still currently having approximately 50% relief.    Complains of pain in her left leg. Today her pain is 1/10VAS. Describes the pain as intermittent cramping and burning. Pain increases with certain positions and activities; pain decreases with rest and procedures. ADL's by self. Denies any bowel or bladder incontinence.    Has been having abnormal BM's since mid June. Reached out to her PCP. Wondering if this could be related to her spine issues. Reviewed that loss of control of bowel or bladder are red flags of spinal issues. She denies this.     Previously completed transforaminal epidural steroid injections on the left at L4 and L5 on 10/31/2019 and 12/3/2019.  Was reporting significant long-term relief.  Also requested to see a neurosurgeon in [place].  As of office visit on 2/24/2020, plan was to repeat TF JULIUS left L4 and L5.  Also referred patient to neurosurgeon that patient was requesting.    Previously failed Gabapentin.     Reviewed Dr Martínez telemedicine evaluation 7-10-20--patient presents for evaluation.  Father was seen earlier today.  Chief complaint is left leg pain that begins in the left head and extends down the left leg laterally to the third and fourth toes.  Has no back pain.  She can only stand or walk for 45 minutes.  This began in March 2019 with no inciting event.  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  Has had 2 epidural steroid injections, none in some time due to COVID  "pandemic.  Has had physical therapy without benefit.  Is taking gabapentin 100 mg daily.  She notes an allergy to prednisone which causes \"crushing knee pain.  \".  Previous MRI from August 21, 2019 shows disc herniation IN the left at L4-5.  Plan to update MRI.  If the disc herniation remains, consider left L4-5 microdiscectomy.     Reviewed telephone encounter with Dr Dhruv Martínez 8-12-20--MRI was received.  Shows a sizable left L4-5 herniation.  Can proceed with left L4-5 microdiscectomy.  Patient reports that since being of July, she has sold her horses.  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  She would like to discuss her test results and hold off on surgery.  She is feeling better.  She will contact office if symptoms return.    Patient remained masked during entire encounter. No cough present. I donned a mask and eye protection throughout entire visit. Prior to donning mask and eye protection, hand hygiene was performed, as well as when it was doffed.  I was closer than 6 feet, but not for an extended period of time. No obvious exposure to any bodily fluids.      Back Pain  This is a chronic problem. The current episode started more than 1 year ago. The problem occurs daily. The problem has been waxing and waning (variable since last evaluation) since onset. The pain is present in the lumbar spine. The quality of the pain is described as aching. The pain radiates to the left knee and left thigh. The pain is at a severity of 1/10. The pain is moderate. The pain is worse during the day. The symptoms are aggravated by bending, position, standing and twisting. Associated symptoms include abdominal pain (cramping), leg pain and numbness. Pertinent negatives include no bladder incontinence, bowel incontinence, chest pain, dysuria, fever, headaches or weakness. Risk factors include sedentary lifestyle. Treatments tried: LTFESI. The treatment provided " "significant relief.      PEG Assessment   What number best describes your pain on average in the past week?5  What number best describes how, during the past week, pain has interfered with your enjoyment of life?8  What number best describes how, during the past week, pain has interfered with your general activity?  8    The following portions of the patient's history were reviewed and updated as appropriate: allergies, current medications, past family history, past medical history, past social history, past surgical history and problem list.    Review of Systems   Constitutional: Positive for fatigue. Negative for activity change, chills and fever.   HENT: Negative for congestion.    Eyes: Negative for visual disturbance.   Respiratory: Negative for chest tightness and shortness of breath.    Cardiovascular: Negative for chest pain.   Gastrointestinal: Positive for abdominal pain (cramping) and diarrhea. Negative for bowel incontinence and constipation.   Genitourinary: Negative for bladder incontinence, difficulty urinating, dyspareunia and dysuria.   Musculoskeletal: Positive for back pain.   Neurological: Positive for numbness. Negative for dizziness, weakness, light-headedness and headaches.   Psychiatric/Behavioral: Positive for agitation and sleep disturbance. The patient is nervous/anxious.      I have reviewed and confirmed the accuracy of the ROS as documented by the MA/LPN/RN Rimma Rodriguez, ASHLEE       Vitals:    07/13/21 1320   BP: 142/89   Pulse: 79   Resp: 16   Temp: 96.8 °F (36 °C)   SpO2: 97%   Weight: 62.7 kg (138 lb 3.2 oz)   Height: 157.5 cm (62\")   PainSc:   1   PainLoc: Leg     Objective   Physical Exam  Constitutional:       Appearance: She is well-developed.   HENT:      Head: Normocephalic and atraumatic.   Musculoskeletal:      Lumbar back: Tenderness present. No bony tenderness. Decreased range of motion.   Neurological:      Mental Status: She is alert.      Gait: Gait abnormal. "   Psychiatric:         Speech: Speech normal.         Behavior: Behavior normal.         Thought Content: Thought content normal.         Judgment: Judgment normal.         Assessment/Plan   Diagnoses and all orders for this visit:    1. Other chronic pain (Primary)    2. Chronic bilateral low back pain with left-sided sciatica    3. Bulging of intervertebral disc between L4 and L5      --- Encouraged patient to follow up with PCP due to bowel changes.  --- Continue with plans for re-evaluation with Dr Camacho. Reviewed diagnostic versus therapeutic response with epidurals.   --- Can repeat TFLESI in future PRN. Patient wants to wait at this time.   --- Follow-up PRN       IRINA REPORT  As the clinician, I personally reviewed the IRINA from 7-13-21 while the patient was in the office today.        EMR Dragon/Transcription disclaimer:   Much of this encounter note is an electronic transcription/translation of spoken language to printed text. The electronic translation of spoken language may permit erroneous, or at times, nonsensical words or phrases to be inadvertently transcribed; Although I have reviewed the note for such errors, some may still exist.

## 2021-07-23 ENCOUNTER — OFFICE VISIT (OUTPATIENT)
Dept: INTERNAL MEDICINE | Facility: CLINIC | Age: 66
End: 2021-07-23

## 2021-07-23 VITALS
DIASTOLIC BLOOD PRESSURE: 80 MMHG | SYSTOLIC BLOOD PRESSURE: 120 MMHG | OXYGEN SATURATION: 96 % | HEART RATE: 85 BPM | HEIGHT: 62 IN | RESPIRATION RATE: 16 BRPM | TEMPERATURE: 96.6 F | BODY MASS INDEX: 25.43 KG/M2 | WEIGHT: 138.2 LBS

## 2021-07-23 DIAGNOSIS — R19.7 DIARRHEA, UNSPECIFIED TYPE: Primary | ICD-10-CM

## 2021-07-23 DIAGNOSIS — R85.7 ABNORMAL INFLAMMATORY BOWEL DISEASE PANEL: ICD-10-CM

## 2021-07-23 PROCEDURE — 99214 OFFICE O/P EST MOD 30 MIN: CPT | Performed by: NURSE PRACTITIONER

## 2021-07-23 RX ORDER — SACCHAROMYCES BOULARDII 250 MG
250 CAPSULE ORAL 2 TIMES DAILY
Qty: 60 CAPSULE | Refills: 0 | Status: SHIPPED | OUTPATIENT
Start: 2021-07-23

## 2021-07-23 NOTE — PROGRESS NOTES
"Tierney Rolon is a 65 y.o. female presenting today for   Chief Complaint   Patient presents with   • Diarrhea     for 4-5weeks       Subjective    History of Present Illness     Ms. Rolon presents for an acute visit reporting a change in her bowel movements over the last 4-5 weeks. She notes one semi-normal BM since 06/10. Most of her stools are loose to watery. They are dark-green or brown. At least 2 and up to 4x/day. No abd or rectal pain. Occas mild cramping. Denies melena or BRRB. She has tried pepto bismol. PMH includes lymphocytic colitis in 2014. Last CLS was in 2016.    The following portions of the patient's history were reviewed and updated as appropriate: allergies, current medications, problem list, past medical history, past surgical history, family history, and social history.    Review of Systems   Gastrointestinal: Positive for diarrhea. Negative for abdominal pain, anal bleeding, blood in stool, constipation, nausea and vomiting.         Objective    Vitals:    07/23/21 1048   BP: 120/80   Pulse: 85   Resp: 16   Temp: 96.6 °F (35.9 °C)   TempSrc: Temporal   SpO2: 96%   Weight: 62.7 kg (138 lb 3.2 oz)   Height: 157.5 cm (62.01\")     Body mass index is 25.27 kg/m².  Nursing notes and vitals reviewed.    Physical Exam  Constitutional:       General: She is not in acute distress.     Appearance: She is well-developed.   Cardiovascular:      Rate and Rhythm: Regular rhythm.      Heart sounds: S1 normal and S2 normal.   Pulmonary:      Effort: Pulmonary effort is normal.      Breath sounds: Normal breath sounds.   Abdominal:      General: Abdomen is flat. Bowel sounds are normal.      Palpations: Abdomen is soft.      Tenderness: There is no abdominal tenderness.   Neurological:      Mental Status: She is alert and oriented to person, place, and time.   Psychiatric:         Attention and Perception: She is attentive.         Behavior: Behavior normal.         Thought Content: Thought content normal. "           Assessment and Plan    Diagnoses and all orders for this visit:    1. Diarrhea, unspecified type (Primary)  -     C-reactive Protein  -     Sedimentation Rate  -     Celiac Disease Panel  -     Inflammatory Bowel Disease Panel  -     Clostridium Difficile Toxin, PCR - Stool, Per Rectum; Future  -     Gastrointestinal Panel, PCR - Stool, Per Rectum; Future  -     Occult Blood, Fecal By Immunoassay - Stool, Per Rectum; Future  -     CBC (No Diff)  -     Comprehensive Metabolic Panel      Increase fiber.  Increase fluid intake.      Medications, including side effects, were discussed with the patient. Patient verbalized understanding.  The plan of care was discussed. All questions were answered. Patient verbalized understanding.

## 2021-07-26 LAB
ALBUMIN SERPL-MCNC: 4.8 G/DL (ref 3.5–5.2)
ALBUMIN/GLOB SERPL: 2.2 G/DL
ALP SERPL-CCNC: 80 U/L (ref 39–117)
ALT SERPL-CCNC: 15 U/L (ref 1–33)
AST SERPL-CCNC: 19 U/L (ref 1–32)
BAKER'S YEAST IGA QN: <20 UNITS (ref 0–24.9)
BAKER'S YEAST IGG QN: 28.5 UNITS (ref 0–24.9)
BILIRUB SERPL-MCNC: 0.7 MG/DL (ref 0–1.2)
BUN SERPL-MCNC: 15 MG/DL (ref 8–23)
BUN/CREAT SERPL: 20.5 (ref 7–25)
CALCIUM SERPL-MCNC: 9.9 MG/DL (ref 8.6–10.5)
CHLORIDE SERPL-SCNC: 100 MMOL/L (ref 98–107)
CO2 SERPL-SCNC: 26 MMOL/L (ref 22–29)
CREAT SERPL-MCNC: 0.73 MG/DL (ref 0.57–1)
CRP SERPL-MCNC: <0.3 MG/DL (ref 0–0.5)
ENDOMYSIUM IGA SER QL: NEGATIVE
ERYTHROCYTE [DISTWIDTH] IN BLOOD BY AUTOMATED COUNT: 12.4 % (ref 12.3–15.4)
ERYTHROCYTE [SEDIMENTATION RATE] IN BLOOD BY WESTERGREN METHOD: 14 MM/HR (ref 0–30)
GLOBULIN SER CALC-MCNC: 2.2 GM/DL
GLUCOSE SERPL-MCNC: 90 MG/DL (ref 65–99)
HCT VFR BLD AUTO: 42.3 % (ref 34–46.6)
HGB BLD-MCNC: 15.1 G/DL (ref 12–15.9)
IGA SERPL-MCNC: 194 MG/DL (ref 87–352)
MCH RBC QN AUTO: 33.7 PG (ref 26.6–33)
MCHC RBC AUTO-ENTMCNC: 35.7 G/DL (ref 31.5–35.7)
MCV RBC AUTO: 94.4 FL (ref 79–97)
P-ANCA ATYPICAL TITR SER IF: ABNORMAL TITER
PLATELET # BLD AUTO: 262 10*3/MM3 (ref 140–450)
POTASSIUM SERPL-SCNC: 3.6 MMOL/L (ref 3.5–5.2)
PROT SERPL-MCNC: 7 G/DL (ref 6–8.5)
RBC # BLD AUTO: 4.48 10*6/MM3 (ref 3.77–5.28)
SODIUM SERPL-SCNC: 140 MMOL/L (ref 136–145)
TTG IGA SER-ACNC: <2 U/ML (ref 0–3)
WBC # BLD AUTO: 7.89 10*3/MM3 (ref 3.4–10.8)

## 2021-09-20 ENCOUNTER — TRANSCRIBE ORDERS (OUTPATIENT)
Dept: MRI IMAGING | Facility: HOSPITAL | Age: 66
End: 2021-09-20

## 2021-09-20 DIAGNOSIS — M51.16 LUMBAR DISC HERNIATION WITH RADICULOPATHY: Primary | ICD-10-CM

## 2021-10-01 ENCOUNTER — HOSPITAL ENCOUNTER (OUTPATIENT)
Dept: MRI IMAGING | Facility: HOSPITAL | Age: 66
Discharge: HOME | End: 2021-10-01
Admitting: SPECIALIST

## 2021-10-01 DIAGNOSIS — M51.16 LUMBAR DISC HERNIATION WITH RADICULOPATHY: ICD-10-CM

## 2021-10-01 PROCEDURE — 72148 MRI LUMBAR SPINE W/O DYE: CPT

## 2021-10-22 DIAGNOSIS — E03.9 ACQUIRED HYPOTHYROIDISM: ICD-10-CM

## 2021-10-22 RX ORDER — LEVOTHYROXINE SODIUM 50 MCG
TABLET ORAL
Qty: 90 TABLET | Refills: 3 | Status: SHIPPED | OUTPATIENT
Start: 2021-10-22 | End: 2022-10-17

## 2021-10-27 DIAGNOSIS — E78.2 MIXED HYPERLIPIDEMIA: ICD-10-CM

## 2021-10-27 RX ORDER — ATORVASTATIN CALCIUM 20 MG/1
TABLET, FILM COATED ORAL
Qty: 90 TABLET | Refills: 3 | Status: SHIPPED | OUTPATIENT
Start: 2021-10-27 | End: 2022-10-24

## 2021-11-08 ENCOUNTER — OFFICE VISIT (OUTPATIENT)
Dept: OBSTETRICS AND GYNECOLOGY | Facility: CLINIC | Age: 66
End: 2021-11-08

## 2021-11-08 VITALS
SYSTOLIC BLOOD PRESSURE: 126 MMHG | WEIGHT: 137.6 LBS | DIASTOLIC BLOOD PRESSURE: 70 MMHG | HEIGHT: 62 IN | BODY MASS INDEX: 25.32 KG/M2

## 2021-11-08 DIAGNOSIS — R68.82 DECREASED LIBIDO: Primary | ICD-10-CM

## 2021-11-08 DIAGNOSIS — Z13.89 SCREENING FOR GENITOURINARY CONDITION: ICD-10-CM

## 2021-11-08 PROCEDURE — 99213 OFFICE O/P EST LOW 20 MIN: CPT | Performed by: NURSE PRACTITIONER

## 2021-11-08 NOTE — PROGRESS NOTES
"Subjective     Chief Complaint   Patient presents with   • Consult     low libido       Tierney Rolon is  a 65 y.o. No obstetric history on file. whose LMP is No LMP recorded. Patient is postmenopausal.. She presents with c/o decreased libido. She reports this occurred in 2017. She was started on Paxil in 2018. She has been seen by [facility] with Dr. Sarah Baldwin. HRT did not help. She denies pain with sex. She is able to achieve orgasm. This problem is new to me, the examiner.     She is S/P hysterectomy for uterine fibroids in 1998. Per her report she has her ovaries and fallopian tubes.     HPI    HPI    The following portions of the patient's history were reviewed and updated as appropriate:vital signs, allergies, current medications, past medical history, past social history, past surgical history and problem list      Review of Systems     Review of Systems   Constitutional: Negative.    Gastrointestinal: Negative.    Genitourinary: Positive for decreased libido. Negative for dyspareunia and vaginal discharge.   Musculoskeletal: Negative.    Skin: Negative.    Psychiatric/Behavioral: Negative.        Objective      /70   Ht 157.5 cm (62\")   Wt 62.4 kg (137 lb 9.6 oz)   Breastfeeding No   BMI 25.17 kg/m²     Physical Exam    Physical Exam  Vitals and nursing note reviewed.   Constitutional:       Appearance: Normal appearance.   Pulmonary:      Effort: Pulmonary effort is normal.   Skin:     General: Skin is warm and dry.   Neurological:      General: No focal deficit present.      Mental Status: She is alert and oriented to person, place, and time.   Psychiatric:         Mood and Affect: Mood normal.         Behavior: Behavior normal.         Lab Review   Labs: No data reviewed     Imaging   No data reviewed    Assessment  Diagnoses and all orders for this visit:    1. Screening for genitourinary condition (Primary)  -     POC Urinalysis Dipstick        Additional Assessment:   1. Decreased " "libido     Plan     1. Decreased libido- Had a lengthy disc with patient regarding the possible causes of decreased libido including but not limited to pain with sex, poor body image, relationship issues, stress, etc.  Pt denies pain with SIC, just reports she has no desire. Disc Vylessi, she does not wish to trial.  Also disc the use of \"scream cream\" from compound pharmacy for orgasm enhancement. SHe reports not trouble reaching orgasm, again just has no desires. Rec open communication with her partner. Disc her planning a night and mentally preparing herself for initiating sex. Info provided on sex therapist in the [place] area, pt is open to seeking an evaluation.       RTO PRN     I spent 25 minutes caring for Tierney on this date of service. This time includes time spent by me in the following activities: obtaining and/or reviewing a separately obtained history, counseling and educating the patient/family/caregiver, referring and communicating with other health care professionals and documenting information in the medical record      Kori Benitez, APRN  11/8/2021        "

## 2021-11-24 PROBLEM — R68.82 DECREASED LIBIDO: Status: ACTIVE | Noted: 2021-11-24

## 2021-12-01 ENCOUNTER — LAB (OUTPATIENT)
Dept: INTERNAL MEDICINE | Facility: CLINIC | Age: 66
End: 2021-12-01

## 2021-12-01 DIAGNOSIS — E03.9 ACQUIRED HYPOTHYROIDISM: ICD-10-CM

## 2021-12-01 DIAGNOSIS — I10 ESSENTIAL HYPERTENSION: ICD-10-CM

## 2021-12-01 DIAGNOSIS — E78.2 MIXED HYPERLIPIDEMIA: ICD-10-CM

## 2021-12-02 LAB
ALBUMIN SERPL-MCNC: 4.7 G/DL (ref 3.8–4.8)
ALBUMIN/GLOB SERPL: 2.5 {RATIO} (ref 1.2–2.2)
ALP SERPL-CCNC: 87 IU/L (ref 44–121)
ALT SERPL-CCNC: 20 IU/L (ref 0–32)
AST SERPL-CCNC: 18 IU/L (ref 0–40)
BILIRUB SERPL-MCNC: 0.4 MG/DL (ref 0–1.2)
BUN SERPL-MCNC: 18 MG/DL (ref 8–27)
BUN/CREAT SERPL: 24 (ref 12–28)
CALCIUM SERPL-MCNC: 10 MG/DL (ref 8.7–10.3)
CHLORIDE SERPL-SCNC: 101 MMOL/L (ref 96–106)
CHOLEST SERPL-MCNC: 180 MG/DL (ref 100–199)
CHOLEST/HDLC SERPL: 2.6 RATIO (ref 0–4.4)
CO2 SERPL-SCNC: 23 MMOL/L (ref 20–29)
CREAT SERPL-MCNC: 0.76 MG/DL (ref 0.57–1)
GLOBULIN SER CALC-MCNC: 1.9 G/DL (ref 1.5–4.5)
GLUCOSE SERPL-MCNC: 96 MG/DL (ref 65–99)
HDLC SERPL-MCNC: 68 MG/DL
LDLC SERPL CALC-MCNC: 86 MG/DL (ref 0–99)
POTASSIUM SERPL-SCNC: 4.1 MMOL/L (ref 3.5–5.2)
PROT SERPL-MCNC: 6.6 G/DL (ref 6–8.5)
SODIUM SERPL-SCNC: 141 MMOL/L (ref 134–144)
T4 FREE SERPL-MCNC: 1.28 NG/DL (ref 0.82–1.77)
TRIGL SERPL-MCNC: 151 MG/DL (ref 0–149)
TSH SERPL DL<=0.005 MIU/L-ACNC: 2.98 UIU/ML (ref 0.45–4.5)
VLDLC SERPL CALC-MCNC: 26 MG/DL (ref 5–40)

## 2021-12-07 ENCOUNTER — OFFICE VISIT (OUTPATIENT)
Dept: INTERNAL MEDICINE | Facility: CLINIC | Age: 66
End: 2021-12-07

## 2021-12-07 VITALS
DIASTOLIC BLOOD PRESSURE: 80 MMHG | OXYGEN SATURATION: 96 % | SYSTOLIC BLOOD PRESSURE: 120 MMHG | WEIGHT: 137.4 LBS | RESPIRATION RATE: 16 BRPM | HEART RATE: 89 BPM | HEIGHT: 62 IN | TEMPERATURE: 97.8 F | BODY MASS INDEX: 25.28 KG/M2

## 2021-12-07 DIAGNOSIS — E78.2 MIXED HYPERLIPIDEMIA: ICD-10-CM

## 2021-12-07 DIAGNOSIS — I10 PRIMARY HYPERTENSION: Primary | ICD-10-CM

## 2021-12-07 DIAGNOSIS — E03.9 ACQUIRED HYPOTHYROIDISM: ICD-10-CM

## 2021-12-07 PROCEDURE — 99214 OFFICE O/P EST MOD 30 MIN: CPT | Performed by: NURSE PRACTITIONER

## 2021-12-07 NOTE — PROGRESS NOTES
Subjective   Tierney Rolon is a 65 y.o. female presenting today for follow up of   Chief Complaint   Patient presents with   • Follow-up   • Hypertension   • Hyperlipidemia   • Hypothyroidism       History of Present Illness     Patient Active Problem List   Diagnosis   • Hypertension   • Hyperlipidemia   • Left bundle branch block (LBBB)   • Acquired hypothyroidism   • Bulging of intervertebral disc between L4 and L5   • Vasomotor symptoms due to menopause   • Other chronic pain   • Chronic bilateral low back pain with left-sided sciatica   • Decreased libido       Outpatient Medications Marked as Taking for the 12/7/21 encounter (Office Visit) with Cassie Callahan APRN   Medication Sig Dispense Refill   • aspirin 81 MG tablet Take  by mouth daily.     • atorvastatin (LIPITOR) 20 MG tablet TAKE 1 TABLET EVERY NIGHT 90 tablet 3   • hydroCHLOROthiazide (HYDRODIURIL) 25 MG tablet TAKE 1 TABLET DAILY 90 tablet 3   • melatonin 5 MG tablet tablet Take 10 mg by mouth At Night As Needed.     • Misc Natural Products (OSTEO BI-FLEX ADV TRIPLE ST) tablet Take  by mouth.     • Omega-3 Fatty Acids (FISH OIL) 1000 MG capsule capsule Take 2,000 mg by mouth Daily.     • PARoxetine (PAXIL) 10 MG tablet TAKE 1 TABLET EVERY MORNING 90 tablet 3   • saccharomyces boulardii (Florastor) 250 MG capsule Take 1 capsule by mouth 2 (Two) Times a Day. 60 capsule 0   • Synthroid 50 MCG tablet TAKE 1 TABLET DAILY 90 tablet 3   • Ubiquinol (Qunol CoQ10/Ubiquinol/Zuhair) 100 MG capsule          HTN - tolerating medication regimen w/o c/o;does not self monitor; denies CP, SOB, HA, or CP     HLD - tolerating statin and ASA w/o c/o     Thyroid - taking 50mcg of Levothyroxine     She plans for spinal surgery L4-5 microdiscectomy at [facility] in early January.    The following portions of the patient's history were reviewed and updated as appropriate: allergies, current medications, past family history, past medical history, past social history, past  "surgical history and problem list.    Review of Systems   Constitutional: Negative for fatigue.   Respiratory: Negative for cough and shortness of breath.    Cardiovascular: Negative for chest pain and palpitations.   Neurological: Negative for dizziness and headache.       Objective   Vitals:    12/07/21 1256   BP: 120/80   Pulse: 89   Resp: 16   Temp: 97.8 °F (36.6 °C)   SpO2: 96%   Weight: 62.3 kg (137 lb 6.4 oz)   Height: 157.5 cm (62.01\")       BP Readings from Last 3 Encounters:   12/07/21 120/80   11/08/21 126/70   07/23/21 120/80        Wt Readings from Last 3 Encounters:   12/07/21 62.3 kg (137 lb 6.4 oz)   11/08/21 62.4 kg (137 lb 9.6 oz)   10/01/21 61.2 kg (135 lb)        Body mass index is 25.12 kg/m².  Nursing notes and vitals reviewed.    Physical Exam  Constitutional:       General: She is not in acute distress.     Appearance: She is well-developed.   Neck:      Thyroid: No thyroid mass or thyromegaly.   Cardiovascular:      Rate and Rhythm: Regular rhythm.      Heart sounds: S1 normal and S2 normal.   Pulmonary:      Effort: Pulmonary effort is normal.      Breath sounds: Normal breath sounds.   Musculoskeletal:      Cervical back: Neck supple.   Lymphadenopathy:      Cervical: No cervical adenopathy.   Neurological:      Mental Status: She is alert and oriented to person, place, and time.   Psychiatric:         Attention and Perception: She is attentive.         Behavior: Behavior normal.         Thought Content: Thought content normal.         Recent Results (from the past 672 hour(s))   T4, Free    Collection Time: 12/01/21  9:02 AM    Specimen: Blood   Result Value Ref Range    Free T4 1.28 0.82 - 1.77 ng/dL   TSH    Collection Time: 12/01/21  9:02 AM    Specimen: Blood   Result Value Ref Range    TSH 2.980 0.450 - 4.500 uIU/mL   Lipid Panel With / Chol / HDL Ratio    Collection Time: 12/01/21  9:02 AM    Specimen: Blood   Result Value Ref Range    Total Cholesterol 180 100 - 199 mg/dL    " Triglycerides 151 (H) 0 - 149 mg/dL    HDL Cholesterol 68 >39 mg/dL    VLDL Cholesterol Trevor 26 5 - 40 mg/dL    LDL Chol Calc (NIH) 86 0 - 99 mg/dL    Chol/HDL Ratio 2.6 0.0 - 4.4 ratio   Comprehensive Metabolic Panel    Collection Time: 12/01/21  9:02 AM    Specimen: Blood   Result Value Ref Range    Glucose 96 65 - 99 mg/dL    BUN 18 8 - 27 mg/dL    Creatinine 0.76 0.57 - 1.00 mg/dL    eGFR Non African Am 84 >59 mL/min/1.73    eGFR African Am 97 >59 mL/min/1.73    BUN/Creatinine Ratio 24 12 - 28    Sodium 141 134 - 144 mmol/L    Potassium 4.1 3.5 - 5.2 mmol/L    Chloride 101 96 - 106 mmol/L    Total CO2 23 20 - 29 mmol/L    Calcium 10.0 8.7 - 10.3 mg/dL    Total Protein 6.6 6.0 - 8.5 g/dL    Albumin 4.7 3.8 - 4.8 g/dL    Globulin 1.9 1.5 - 4.5 g/dL    A/G Ratio 2.5 (H) 1.2 - 2.2    Total Bilirubin 0.4 0.0 - 1.2 mg/dL    Alkaline Phosphatase 87 44 - 121 IU/L    AST (SGOT) 18 0 - 40 IU/L    ALT (SGPT) 20 0 - 32 IU/L         Assessment/Plan   Diagnoses and all orders for this visit:    1. Primary hypertension (Primary)  Comments:  - controlled  Orders:  -     CBC (No Diff); Future  -     Comprehensive Metabolic Panel; Future  -     Urinalysis With Culture If Indicated - Urine, Clean Catch; Future    2. Mixed hyperlipidemia  Comments:  - controlled  Orders:  -     Comprehensive Metabolic Panel; Future  -     Lipid Panel With / Chol / HDL Ratio; Future    3. Acquired hypothyroidism  Comments:  - controlled  Orders:  -     TSH; Future  -     T4, Free; Future        Except as noted above, pt will continue current medications as noted in the medication list. I will continue to authorize refills as needed.        Medications, including side effects, were discussed with the patient. Patient verbalized understanding.  The plan of care was discussed. All questions were answered. Patient verbalized understanding.      Return in about 6 months (around 6/7/2022) for fasting labs one week prior to, Annual physical.

## 2021-12-08 ENCOUNTER — TELEPHONE (OUTPATIENT)
Dept: INTERNAL MEDICINE | Facility: CLINIC | Age: 66
End: 2021-12-08

## 2021-12-08 NOTE — TELEPHONE ENCOUNTER
PATIENT HAS PREOP ORDERS THAT NEED  TO BE SENT TO [facility] FOR HER DRCirilo IN [place]. WANTING TO KNOW HOW BEST TO GET THEM TO YOU.    PLEASE ADVISE  [phone]

## 2021-12-17 ENCOUNTER — LAB (OUTPATIENT)
Dept: LAB | Facility: HOSPITAL | Age: 66
End: 2021-12-17

## 2021-12-17 ENCOUNTER — HOSPITAL ENCOUNTER (OUTPATIENT)
Dept: CARDIOLOGY | Facility: HOSPITAL | Age: 66
Discharge: HOME | End: 2021-12-17

## 2021-12-17 ENCOUNTER — TRANSCRIBE ORDERS (OUTPATIENT)
Dept: ADMINISTRATIVE | Facility: HOSPITAL | Age: 66
End: 2021-12-17

## 2021-12-17 DIAGNOSIS — M51.16 NEURITIS OR RADICULITIS DUE TO RUPTURE OF LUMBAR INTERVERTEBRAL DISC: ICD-10-CM

## 2021-12-17 DIAGNOSIS — Z01.818 PRE-OP EXAMINATION: ICD-10-CM

## 2021-12-17 DIAGNOSIS — Z01.818 PRE-OP EXAMINATION: Primary | ICD-10-CM

## 2021-12-17 LAB
BASOPHILS # BLD AUTO: 0.05 10*3/MM3 (ref 0–0.2)
BASOPHILS NFR BLD AUTO: 0.7 % (ref 0–1.5)
DEPRECATED RDW RBC AUTO: 40.5 FL (ref 37–54)
EOSINOPHIL # BLD AUTO: 0.06 10*3/MM3 (ref 0–0.4)
EOSINOPHIL NFR BLD AUTO: 0.8 % (ref 0.3–6.2)
ERYTHROCYTE [DISTWIDTH] IN BLOOD BY AUTOMATED COUNT: 12.3 % (ref 12.3–15.4)
HCT VFR BLD AUTO: 40.6 % (ref 34–46.6)
HGB BLD-MCNC: 14.4 G/DL (ref 12–15.9)
IMM GRANULOCYTES # BLD AUTO: 0.02 10*3/MM3 (ref 0–0.05)
IMM GRANULOCYTES NFR BLD AUTO: 0.3 % (ref 0–0.5)
LYMPHOCYTES # BLD AUTO: 1.51 10*3/MM3 (ref 0.7–3.1)
LYMPHOCYTES NFR BLD AUTO: 20.8 % (ref 19.6–45.3)
MCH RBC QN AUTO: 33 PG (ref 26.6–33)
MCHC RBC AUTO-ENTMCNC: 35.5 G/DL (ref 31.5–35.7)
MCV RBC AUTO: 92.9 FL (ref 79–97)
MONOCYTES # BLD AUTO: 0.36 10*3/MM3 (ref 0.1–0.9)
MONOCYTES NFR BLD AUTO: 5 % (ref 5–12)
NEUTROPHILS NFR BLD AUTO: 5.25 10*3/MM3 (ref 1.7–7)
NEUTROPHILS NFR BLD AUTO: 72.4 % (ref 42.7–76)
NRBC BLD AUTO-RTO: 0 /100 WBC (ref 0–0.2)
PLATELET # BLD AUTO: 270 10*3/MM3 (ref 140–450)
PMV BLD AUTO: 10.6 FL (ref 6–12)
RBC # BLD AUTO: 4.37 10*6/MM3 (ref 3.77–5.28)
WBC NRBC COR # BLD: 7.25 10*3/MM3 (ref 3.4–10.8)

## 2021-12-17 PROCEDURE — 87081 CULTURE SCREEN ONLY: CPT

## 2021-12-17 PROCEDURE — 85025 COMPLETE CBC W/AUTO DIFF WBC: CPT

## 2021-12-17 PROCEDURE — 93005 ELECTROCARDIOGRAM TRACING: CPT | Performed by: SPECIALIST

## 2021-12-17 PROCEDURE — 36415 COLL VENOUS BLD VENIPUNCTURE: CPT

## 2021-12-17 PROCEDURE — 93010 ELECTROCARDIOGRAM REPORT: CPT | Performed by: INTERNAL MEDICINE

## 2021-12-18 LAB — QT INTERVAL: 396 MS

## 2021-12-19 LAB
MRSA NOSE QL CULT: NORMAL
MRSA SPEC QL CULT: NORMAL

## 2021-12-20 ENCOUNTER — TELEPHONE (OUTPATIENT)
Dept: INTERNAL MEDICINE | Facility: CLINIC | Age: 66
End: 2021-12-20

## 2021-12-20 NOTE — TELEPHONE ENCOUNTER
Caller: Tierney Rolon    Relationship: Self    Best call back number: [phone]    What orders are you requesting (i.e. lab or imaging): REFERRAL TO CARDIOLOGIST    In what timeframe would the patient need to come in: AS SOON AS POSSIBLE    Where will you receive your lab/imaging services: PATIENT HAS SEEN DR MISHA BANERJEE IN THE PAST    YESICA NESBITT HAS ALSO SEEN PATIENT AND READ HER LATEST EKG.    Additional notes: PATIENT QUESTIONED IF DR BANERJEE WAS STILL PRACTICING.  PATIENT HAS HAD AN EKG AND IT SHOWED AN ENLARGED LEFT ATRIAL LOBE.    PATIENT STATED THAT THE RECENT EKG WAS DONE AT [place]

## 2022-01-20 ENCOUNTER — OFFICE VISIT (OUTPATIENT)
Dept: CARDIOLOGY | Facility: CLINIC | Age: 67
End: 2022-01-20

## 2022-01-20 VITALS
OXYGEN SATURATION: 98 % | WEIGHT: 138 LBS | BODY MASS INDEX: 25.4 KG/M2 | DIASTOLIC BLOOD PRESSURE: 78 MMHG | SYSTOLIC BLOOD PRESSURE: 118 MMHG | HEIGHT: 62 IN | HEART RATE: 81 BPM

## 2022-01-20 DIAGNOSIS — Z01.818 PRE-OPERATIVE CLEARANCE: ICD-10-CM

## 2022-01-20 DIAGNOSIS — R94.31 ABNORMAL EKG: ICD-10-CM

## 2022-01-20 DIAGNOSIS — I44.7 LBBB (LEFT BUNDLE BRANCH BLOCK): Primary | ICD-10-CM

## 2022-01-20 DIAGNOSIS — R07.2 PRECORDIAL PAIN: ICD-10-CM

## 2022-01-20 PROCEDURE — 99204 OFFICE O/P NEW MOD 45 MIN: CPT | Performed by: INTERNAL MEDICINE

## 2022-01-24 NOTE — PROGRESS NOTES
Date of Office Visit: 2022  Encounter Provider: Ponce Chacon MD  Place of Service: [facility] CARDIOLOGY  Patient Name: Tierney Rolon  :1955    Chief complaint: Preoperative clearance, left bundle branch block, chest tightness.    History of Present Illness:    I had the pleasure of seeing the patient in cardiology office on 2022.  She is a very pleasant 66 year-old female with a history of SVT status post ablation, hypertension, hyperlipidemia, and left bundle branch block who presents for evaluation.  The patient has formerly followed with Dr. Yan in [place] cardiology, but will be switching care to me today.  She is a retired EMT.    The patient has a history of SVT, which was originally discovered in .  She was very symptomatic with this, and she actually brought the EMT strip with her from that occasion, during which time her heart rate was 216 bpm.  She underwent an ablation in  with Dr. Yan, and underwent a repeat ablation in .  She has had a longstanding left bundle branch block since approximately .  She does have a family history of coronary artery disease with her father having a four-vessel CABG at age 79.  Her mother also has a history of atrial fibrillation.    The patient presents today for preoperative clearance.  She is undergoing evaluation for an L4-L5 surgery at the [facility] by Dr. Dhruv Martínez.  She has not had any cardiac testing in quite some time.  She denies any recent shortness of breath.  However, she has had some chest tightness, which randomly occurs with stress typically.  This has not been exertional per se, but has been noticeable.  She does not have a personal history of known coronary artery disease.    Past Medical History:   Diagnosis Date   • Disease of thyroid gland     SUBCLINICAL HYPOTHYROIDISM   • Fibroid [id number]    Uterine fibroid/s removed.   • GERD (gastroesophageal  reflux disease)    • Hx of Vincenzo Mountain spotted fever    • Hyperlipidemia    • Hypertension    • Left bundle branch block    • Lymphocytic colitis 6/12/2016   • Osteopenia    • Osteopenia    • Supraventricular tachycardia (HCC)     Status post ablation in 2009 and again in 2011   • Varicella very young age       Past Surgical History:   Procedure Laterality Date   • BREAST BIOPSY Left     benign   • BREAST SURGERY     • CARDIAC ABLATION  04/09/2009    Karthik Malikrekha at [facility] x2   • CATARACT EXTRACTION  January 2021    both eyes   • EPIDURAL Left 6/29/2021    Procedure: transforaminal epidural steroid injection left lumbar 4 and lumbar 5;  Surgeon: Hung Lemos MD;  [place] [facility] [place]  Service: Pain Management;  Laterality: Left;   • FRACTURE SURGERY      WRIST   • MYOMECTOMY ABDOMINAL APPROACH  [id number]   • TONSILLECTOMY     • TOTAL ABDOMINAL HYSTERECTOMY  12/22/1998   • UTERINE FIBROID SURGERY     • WISDOM TOOTH EXTRACTION  very young age       Current Outpatient Medications on File Prior to Visit   Medication Sig Dispense Refill   • aspirin 81 MG tablet Take  by mouth daily.     • atorvastatin (LIPITOR) 20 MG tablet TAKE 1 TABLET EVERY NIGHT 90 tablet 3   • hydroCHLOROthiazide (HYDRODIURIL) 25 MG tablet TAKE 1 TABLET DAILY 90 tablet 3   • melatonin 5 MG tablet tablet Take 10 mg by mouth At Night As Needed.     • Misc Natural Products (OSTEO BI-FLEX ADV TRIPLE ST) tablet Take  by mouth.     • Omega-3 Fatty Acids (FISH OIL) 1000 MG capsule capsule Take 2,000 mg by mouth Daily.     • PARoxetine (PAXIL) 10 MG tablet TAKE 1 TABLET EVERY MORNING 90 tablet 3   • saccharomyces boulardii (Florastor) 250 MG capsule Take 1 capsule by mouth 2 (Two) Times a Day. 60 capsule 0   • Synthroid 50 MCG tablet TAKE 1 TABLET DAILY 90 tablet 3   • Ubiquinol (Qunol CoQ10/Ubiquinol/Zuhair) 100 MG capsule        No current facility-administered medications on file prior to visit.     Allergies as of 01/20/2022 -  "Reviewed 01/20/2022   Allergen Reaction Noted   • Prednisone Other (See Comments) 05/10/2016     Social History     Socioeconomic History   • Marital status:    Tobacco Use   • Smoking status: Never Smoker   • Smokeless tobacco: Never Used   Substance and Sexual Activity   • Alcohol use: Yes     Alcohol/week: 5.0 - 7.0 standard drinks     Types: 5 - 7 Glasses of wine per week   • Drug use: No   • Sexual activity: Not Currently     Partners: Male     Birth control/protection: Post-menopausal, Other     Comment: Had hysterectomy on 12/22/1998.     Family History   Problem Relation Age of Onset   • Heart disease Other    • Hypertension Other    • Cancer Other    • Asthma Other    • Kidney disease Other    • Alcohol abuse Other    • Arthritis Other    • Glaucoma Other    • Macular degeneration Other    • Skin cancer Mother    • Hypertension Mother    • Atrial fibrillation Mother    • Heart disease Father 79        CABG   • COPD Father    • Skin cancer Father    • Nephrolithiasis Father         congenital unilateral kidney   • Asthma Sister    • Diabetes Sister         diagnosed in 2019   • Glaucoma Maternal Grandmother    • Macular degeneration Maternal Grandmother    • Hypertension Paternal Grandmother    • Glaucoma Paternal Grandmother    • Breast cancer Neg Hx    • Colon cancer Neg Hx    • Ovarian cancer Neg Hx    • Uterine cancer Neg Hx        Review of Systems   Cardiovascular: Positive for chest pain.   Musculoskeletal: Positive for back pain and joint pain.   All other systems reviewed and are negative.     Objective:     Vitals:    01/20/22 1206   BP: 118/78   Pulse: 81   SpO2: 98%   Weight: 62.6 kg (138 lb)   Height: 157.5 cm (62.01\")     Body mass index is 25.23 kg/m².    Constitutional:       Appearance: Healthy appearance. Well-developed.   Eyes:      Conjunctiva/sclera: Conjunctivae normal.   HENT:      Head: Normocephalic and atraumatic.   Pulmonary:      Effort: Pulmonary effort is normal.      " Breath sounds: Normal breath sounds.   Cardiovascular:      Normal rate. Regular rhythm.      Murmurs: There is no murmur.      No gallop.   Edema:     Peripheral edema absent.   Abdominal:      Palpations: Abdomen is soft.      Tenderness: There is no abdominal tenderness.   Musculoskeletal:      Cervical back: Neck supple. Skin:     General: Skin is warm.   Neurological:      Mental Status: Alert and oriented to person, place, and time.   Psychiatric:         Behavior: Behavior normal.       Lab Review:   Procedures    Lipid Panel    Lipid Panel 5/13/21 12/1/21   Total Cholesterol 212 (A) 180   Triglycerides 138 151 (A)   HDL Cholesterol 70 (A) 68   VLDL Cholesterol 24 26   LDL Cholesterol  118 (A) 86   (A) Abnormal value       Comments are available for some flowsheets but are not being displayed.              Cardiac Procedures:  1.  Status post SVT ablation in 2009, and again in 2011 by Dr. Yan at [facility].    Assessment:       Diagnosis Plan   1. LBBB (left bundle branch block)  Adult Transthoracic Echo Complete w/ Color, Spectral and Contrast if Necessary Per Protocol    Stress Test With Myocardial Perfusion One Day   2. Pre-operative clearance  Adult Transthoracic Echo Complete w/ Color, Spectral and Contrast if Necessary Per Protocol    Stress Test With Myocardial Perfusion One Day   3. Abnormal EKG  Adult Transthoracic Echo Complete w/ Color, Spectral and Contrast if Necessary Per Protocol    Stress Test With Myocardial Perfusion One Day   4. Precordial pain  Adult Transthoracic Echo Complete w/ Color, Spectral and Contrast if Necessary Per Protocol    Stress Test With Myocardial Perfusion One Day     Plan:       She is scheduled for an L4-L5 surgery at the [facility] with Dr. Dhruv Martínez.  Again, she has not had any recent cardiac testing.  She had an SVT ablation in 2009 and again in 2011.  She has had no symptoms consistent with recurrent SVT.  She has a longstanding left  bundle branch block, which was recently again noted on an EKG.  She has had some random chest tightness with stress.  She has not had any shortness of breath.  She is already taking aspirin and atorvastatin.  Her blood pressure appears to be well controlled on the HCTZ.    I have recommended proceeding with an echocardiogram, mainly to ensure that she does not have a dyskinetic septum in the setting of her left bundle branch block.  I want to make sure that her ejection fraction is normal.  I have also recommended a Lexiscan Myoview stress test given the chest tightness.  She does have a family history of coronary artery disease in her father.  Further plans and follow-up will be made pending the results of these tests.

## 2022-01-25 ENCOUNTER — APPOINTMENT (OUTPATIENT)
Dept: CARDIOLOGY | Facility: HOSPITAL | Age: 67
End: 2022-01-25

## 2022-03-21 ENCOUNTER — TELEPHONE (OUTPATIENT)
Dept: INTERNAL MEDICINE | Facility: CLINIC | Age: 67
End: 2022-03-21

## 2022-03-21 DIAGNOSIS — Z78.0 POST-MENOPAUSE: Primary | ICD-10-CM

## 2022-03-21 NOTE — TELEPHONE ENCOUNTER
Caller: Tierney Rolon    Relationship: Self    Best call back number: [phone]    What is the medical concern/diagnosis: ACTIVE ORDER    What specialty or service is being requested: DEXA SCAN    What is the provider, practice or medical service name: [facility]     [facility]      PLEASE CONTACT PATIENT BACK TO SCHEDULE

## 2022-03-22 ENCOUNTER — TRANSCRIBE ORDERS (OUTPATIENT)
Dept: ADMINISTRATIVE | Facility: HOSPITAL | Age: 67
End: 2022-03-22

## 2022-03-22 DIAGNOSIS — Z12.31 BREAST CANCER SCREENING BY MAMMOGRAM: Primary | ICD-10-CM

## 2022-03-25 ENCOUNTER — HOSPITAL ENCOUNTER (OUTPATIENT)
Dept: MAMMOGRAPHY | Facility: HOSPITAL | Age: 67
Discharge: HOME | End: 2022-03-25

## 2022-03-25 ENCOUNTER — APPOINTMENT (OUTPATIENT)
Dept: BONE DENSITY | Facility: HOSPITAL | Age: 67
End: 2022-03-25

## 2022-03-25 DIAGNOSIS — Z12.31 BREAST CANCER SCREENING BY MAMMOGRAM: ICD-10-CM

## 2022-03-25 DIAGNOSIS — Z78.0 POST-MENOPAUSE: ICD-10-CM

## 2022-03-25 PROCEDURE — 77067 SCR MAMMO BI INCL CAD: CPT

## 2022-03-25 PROCEDURE — 77080 DXA BONE DENSITY AXIAL: CPT

## 2022-03-25 PROCEDURE — 77063 BREAST TOMOSYNTHESIS BI: CPT

## 2022-04-18 DIAGNOSIS — Z78.0 MENOPAUSE: ICD-10-CM

## 2022-04-18 RX ORDER — PAROXETINE 10 MG/1
TABLET, FILM COATED ORAL
Qty: 90 TABLET | Refills: 3 | Status: SHIPPED | OUTPATIENT
Start: 2022-04-18

## 2022-04-18 RX ORDER — HYDROCHLOROTHIAZIDE 25 MG/1
TABLET ORAL
Qty: 90 TABLET | Refills: 3 | Status: SHIPPED | OUTPATIENT
Start: 2022-04-18

## 2022-04-18 NOTE — TELEPHONE ENCOUNTER
Rx Refill Note  Requested Prescriptions     Pending Prescriptions Disp Refills   • PARoxetine (PAXIL) 10 MG tablet [Pharmacy Med Name: PAROXETINE HCL TABS 10MG] 90 tablet 3     Sig: TAKE 1 TABLET EVERY MORNING   • hydroCHLOROthiazide (HYDRODIURIL) 25 MG tablet [Pharmacy Med Name: HYDROCHLOROTHIAZIDE TABS 25MG] 90 tablet 3     Sig: TAKE 1 TABLET DAILY      Last office visit with prescribing clinician: 12/7/2021      Next office visit with prescribing clinician: 6/27/2022 04/18/22, 10:24 EDT

## 2022-06-27 ENCOUNTER — OFFICE VISIT (OUTPATIENT)
Dept: INTERNAL MEDICINE | Facility: CLINIC | Age: 67
End: 2022-06-27

## 2022-06-27 VITALS
DIASTOLIC BLOOD PRESSURE: 76 MMHG | WEIGHT: 135.8 LBS | BODY MASS INDEX: 24.99 KG/M2 | HEART RATE: 82 BPM | TEMPERATURE: 98.4 F | HEIGHT: 62 IN | SYSTOLIC BLOOD PRESSURE: 132 MMHG | OXYGEN SATURATION: 98 %

## 2022-06-27 DIAGNOSIS — Z00.00 ENCOUNTER FOR WELLNESS EXAMINATION IN ADULT: Primary | ICD-10-CM

## 2022-06-27 DIAGNOSIS — E03.9 ACQUIRED HYPOTHYROIDISM: ICD-10-CM

## 2022-06-27 DIAGNOSIS — E78.2 MIXED HYPERLIPIDEMIA: ICD-10-CM

## 2022-06-27 DIAGNOSIS — I10 PRIMARY HYPERTENSION: ICD-10-CM

## 2022-06-27 PROCEDURE — 99396 PREV VISIT EST AGE 40-64: CPT | Performed by: NURSE PRACTITIONER

## 2022-06-27 NOTE — PROGRESS NOTES
KIMBERLY Monet is a 66 y.o. female presenting for Annual Exam, Follow-up, Hypertension, Hyperlipidemia, and Hypothyroidism    Her current/chronic health conditions include:  Patient Active Problem List   Diagnosis   • Hypertension   • Hyperlipidemia   • Left bundle branch block (LBBB)   • Acquired hypothyroidism   • Bulging of intervertebral disc between L4 and L5   • Vasomotor symptoms due to menopause   • Other chronic pain   • Chronic bilateral low back pain with left-sided sciatica   • Decreased libido       Outpatient Medications Marked as Taking for the 6/27/22 encounter (Office Visit) with Cassie Callahan APRN   Medication Sig Dispense Refill   • aspirin 81 MG tablet Take  by mouth daily.     • atorvastatin (LIPITOR) 20 MG tablet TAKE 1 TABLET EVERY NIGHT 90 tablet 3   • hydroCHLOROthiazide (HYDRODIURIL) 25 MG tablet TAKE 1 TABLET DAILY 90 tablet 3   • melatonin 5 MG tablet tablet Take 10 mg by mouth At Night As Needed.     • Misc Natural Products (OSTEO BI-FLEX ADV TRIPLE ST) tablet Take  by mouth.     • Omega-3 Fatty Acids (FISH OIL) 1000 MG capsule capsule Take 2,000 mg by mouth Daily.     • PARoxetine (PAXIL) 10 MG tablet TAKE 1 TABLET EVERY MORNING 90 tablet 3   • saccharomyces boulardii (Florastor) 250 MG capsule Take 1 capsule by mouth 2 (Two) Times a Day. 60 capsule 0   • Synthroid 50 MCG tablet TAKE 1 TABLET DAILY 90 tablet 3   • Ubiquinol 100 MG capsule        HTN - tolerating medication regimen w/o c/o;does not self monitor; denies CP, SOB, HA, or CP     HLD - tolerating statin and ASA w/o c/o     Thyroid - taking 50mcg of Levothyroxine    Health Habits:  Exercise: 7 times/week. Dog-walking    Screenings:  PAP: s/p hyst  Mammogram: neg 03/2022  Dexa: nml 03/2022  Colon Cancer: nml 2014        The following portions of the patient's history were reviewed and updated as appropriate: allergies, current medications, problem list, past medical history, past family history, and past social  "history.        OBJECTIVE    Vitals:    06/27/22 1123   BP: 132/76   Pulse: 82   Temp: 98.4 °F (36.9 °C)   SpO2: 98%   Weight: 61.6 kg (135 lb 12.8 oz)   Height: 157.5 cm (62\")       BP Readings from Last 3 Encounters:   06/27/22 132/76   01/31/22 138/91   01/20/22 118/78        Wt Readings from Last 3 Encounters:   06/27/22 61.6 kg (135 lb 12.8 oz)   01/31/22 61.2 kg (135 lb)   01/20/22 62.6 kg (138 lb)        Body mass index is 24.84 kg/m².  Nursing notes and vital signs reviewed.      Physical Exam  Constitutional:       General: She is not in acute distress.     Appearance: Normal appearance. She is well-developed.   HENT:      Head: Normocephalic.      Right Ear: Hearing, tympanic membrane, ear canal and external ear normal.      Left Ear: Hearing, tympanic membrane, ear canal and external ear normal.      Nose: Nose normal. No mucosal edema or rhinorrhea.      Mouth/Throat:      Mouth: Mucous membranes are moist.      Pharynx: Oropharynx is clear. Uvula midline.   Eyes:      General: Lids are normal.      Extraocular Movements: Extraocular movements intact.      Conjunctiva/sclera: Conjunctivae normal.      Pupils: Pupils are equal, round, and reactive to light.   Neck:      Thyroid: No thyroid mass or thyromegaly.   Cardiovascular:      Rate and Rhythm: Regular rhythm.      Pulses: Normal pulses.      Heart sounds: S1 normal and S2 normal. No murmur heard.    No friction rub. No gallop.   Pulmonary:      Effort: Pulmonary effort is normal.      Breath sounds: Normal breath sounds. No wheezing, rhonchi or rales.   Abdominal:      General: Bowel sounds are normal.      Palpations: Abdomen is soft.      Tenderness: There is no abdominal tenderness. There is no guarding.      Hernia: No hernia is present.   Musculoskeletal:         General: No deformity. Normal range of motion.      Cervical back: Normal range of motion and neck supple.   Lymphadenopathy:      Cervical: No cervical adenopathy.   Skin:     " General: Skin is warm and dry.      Findings: No lesion or rash.   Neurological:      General: No focal deficit present.      Mental Status: She is alert and oriented to person, place, and time.      Cranial Nerves: No cranial nerve deficit.      Sensory: No sensory deficit.      Motor: Motor function is intact.      Coordination: Coordination is intact.      Gait: Gait normal.      Deep Tendon Reflexes: Reflexes are normal and symmetric.   Psychiatric:         Attention and Perception: She is attentive.         Mood and Affect: Mood and affect normal.         Speech: Speech normal.         Behavior: Behavior normal.         Thought Content: Thought content normal.         Recent Results (from the past 672 hour(s))   CBC (No Diff)    Collection Time: 06/20/22  9:15 AM    Specimen: Blood   Result Value Ref Range    WBC 4.8 3.4 - 10.8 x10E3/uL    RBC 4.21 3.77 - 5.28 x10E6/uL    Hemoglobin 14.0 11.1 - 15.9 g/dL    Hematocrit 40.8 34.0 - 46.6 %    MCV 97 79 - 97 fL    MCH 33.3 (H) 26.6 - 33.0 pg    MCHC 34.3 31.5 - 35.7 g/dL    RDW 12.6 11.7 - 15.4 %    Platelets 238 150 - 450 x10E3/uL   Comprehensive Metabolic Panel    Collection Time: 06/20/22  9:15 AM    Specimen: Blood   Result Value Ref Range    Glucose 94 65 - 99 mg/dL    BUN 15 8 - 27 mg/dL    Creatinine 0.79 0.57 - 1.00 mg/dL    EGFR Result 83 >59 mL/min/1.73    BUN/Creatinine Ratio 19 12 - 28    Sodium 142 134 - 144 mmol/L    Potassium 4.0 3.5 - 5.2 mmol/L    Chloride 101 96 - 106 mmol/L    Total CO2 25 20 - 29 mmol/L    Calcium 9.5 8.7 - 10.3 mg/dL    Total Protein 6.6 6.0 - 8.5 g/dL    Albumin 4.7 3.8 - 4.8 g/dL    Globulin 1.9 1.5 - 4.5 g/dL    A/G Ratio 2.5 (H) 1.2 - 2.2    Total Bilirubin 0.6 0.0 - 1.2 mg/dL    Alkaline Phosphatase 80 44 - 121 IU/L    AST (SGOT) 22 0 - 40 IU/L    ALT (SGPT) 22 0 - 32 IU/L   Lipid Panel With / Chol / HDL Ratio    Collection Time: 06/20/22  9:15 AM    Specimen: Blood   Result Value Ref Range    Total Cholesterol 181 100 -  199 mg/dL    Triglycerides 120 0 - 149 mg/dL    HDL Cholesterol 63 >39 mg/dL    VLDL Cholesterol Trevor 21 5 - 40 mg/dL    LDL Chol Calc ([facility]) 97 0 - 99 mg/dL    Chol/HDL Ratio 2.9 0.0 - 4.4 ratio   TSH    Collection Time: 06/20/22  9:15 AM    Specimen: Blood   Result Value Ref Range    TSH 2.590 0.450 - 4.500 uIU/mL   T4, Free    Collection Time: 06/20/22  9:15 AM    Specimen: Blood   Result Value Ref Range    Free T4 1.03 0.82 - 1.77 ng/dL   Urinalysis With Culture If Indicated - Urine, Clean Catch    Collection Time: 06/20/22  9:15 AM    Specimen: Urine, Clean Catch   Result Value Ref Range    Specific Gravity, UA 1.020 1.005 - 1.030    pH, UA 5.5 5.0 - 7.5    Color, UA Yellow Yellow    Appearance, UA Clear Clear    Leukocytes, UA Negative Negative    Protein Negative Negative/Trace    Glucose, UA Negative Negative    Ketones Negative Negative    Blood, UA Negative Negative    Bilirubin, UA Negative Negative    Urobilinogen, UA 0.2 0.2 - 1.0 mg/dL    Nitrite, UA Negative Negative    Microscopic Examination Comment     Microscopic Examination See below:     Urinalysis Reflex Comment    Microscopic Examination -    Collection Time: 06/20/22  9:15 AM   Result Value Ref Range    WBC, UA None seen 0 - 5 /hpf    RBC, UA None seen 0 - 2 /hpf    Epithelial Cells (non renal) None seen 0 - 10 /hpf    Casts None seen None seen /lpf    Bacteria, UA None seen None seen/Few         ASSESSMENT AND PLAN    Diagnoses and all orders for this visit:    1. Encounter for wellness examination in adult (Primary)    2. Primary hypertension    3. Mixed hyperlipidemia    4. Acquired hypothyroidism        Preventative counseling completed including relevant screenings, appropriate vaccinations, healthy nutrition, and appropriate physical activity.  BMI is within normal parameters. No other follow-up for BMI required.    1. Recommended 2nd COVID vaccine booster.    2. Controlled    3. Controlled    4. Controlled    Except as noted above, pt  will continue current medications as noted in the medication list. I will continue to authorize refills as needed.      Medications, including side effects, were discussed with the patient. Patient verbalized understanding.  The plan of care was discussed. All questions were answered. Patient verbalized understanding.        Return in about 6 months (around 12/27/2022) for fasting labs one week prior to., or sooner if needed.

## 2022-10-17 DIAGNOSIS — E03.9 ACQUIRED HYPOTHYROIDISM: ICD-10-CM

## 2022-10-17 RX ORDER — LEVOTHYROXINE SODIUM 50 MCG
TABLET ORAL
Qty: 90 TABLET | Refills: 3 | Status: SHIPPED | OUTPATIENT
Start: 2022-10-17

## 2022-10-24 DIAGNOSIS — E78.2 MIXED HYPERLIPIDEMIA: ICD-10-CM

## 2022-10-24 RX ORDER — ATORVASTATIN CALCIUM 20 MG/1
TABLET, FILM COATED ORAL
Qty: 90 TABLET | Refills: 3 | Status: SHIPPED | OUTPATIENT
Start: 2022-10-24

## 2022-12-14 DIAGNOSIS — I10 PRIMARY HYPERTENSION: ICD-10-CM

## 2022-12-14 DIAGNOSIS — E03.9 ACQUIRED HYPOTHYROIDISM: ICD-10-CM

## 2022-12-14 DIAGNOSIS — E78.2 MIXED HYPERLIPIDEMIA: ICD-10-CM

## 2022-12-20 LAB
ALBUMIN SERPL-MCNC: 4.6 G/DL (ref 3.5–5.2)
ALBUMIN/GLOB SERPL: 2.6 G/DL
ALP SERPL-CCNC: 92 U/L (ref 39–117)
ALT SERPL-CCNC: 18 U/L (ref 1–33)
AST SERPL-CCNC: 20 U/L (ref 1–32)
BILIRUB SERPL-MCNC: 0.4 MG/DL (ref 0–1.2)
BUN SERPL-MCNC: 17 MG/DL (ref 8–23)
BUN/CREAT SERPL: 22.1 (ref 7–25)
CALCIUM SERPL-MCNC: 9.4 MG/DL (ref 8.6–10.5)
CHLORIDE SERPL-SCNC: 98 MMOL/L (ref 98–107)
CHOLEST SERPL-MCNC: 183 MG/DL (ref 0–200)
CHOLEST/HDLC SERPL: 3.16 {RATIO}
CO2 SERPL-SCNC: 31.5 MMOL/L (ref 22–29)
CREAT SERPL-MCNC: 0.77 MG/DL (ref 0.57–1)
EGFRCR SERPLBLD CKD-EPI 2021: 85.7 ML/MIN/1.73
GLOBULIN SER CALC-MCNC: 1.8 GM/DL
GLUCOSE SERPL-MCNC: 108 MG/DL (ref 65–99)
HDLC SERPL-MCNC: 58 MG/DL (ref 40–60)
LDLC SERPL CALC-MCNC: 99 MG/DL (ref 0–100)
POTASSIUM SERPL-SCNC: 3.9 MMOL/L (ref 3.5–5.2)
PROT SERPL-MCNC: 6.4 G/DL (ref 6–8.5)
SODIUM SERPL-SCNC: 138 MMOL/L (ref 136–145)
T4 FREE SERPL-MCNC: 1.26 NG/DL (ref 0.93–1.7)
TRIGL SERPL-MCNC: 148 MG/DL (ref 0–150)
TSH SERPL DL<=0.005 MIU/L-ACNC: 2.99 UIU/ML (ref 0.27–4.2)
VLDLC SERPL CALC-MCNC: 26 MG/DL (ref 5–40)

## 2022-12-27 ENCOUNTER — OFFICE VISIT (OUTPATIENT)
Dept: INTERNAL MEDICINE | Facility: CLINIC | Age: 67
End: 2022-12-27

## 2022-12-27 VITALS
BODY MASS INDEX: 26.24 KG/M2 | SYSTOLIC BLOOD PRESSURE: 126 MMHG | WEIGHT: 142.6 LBS | DIASTOLIC BLOOD PRESSURE: 68 MMHG | HEART RATE: 96 BPM | TEMPERATURE: 96.9 F | OXYGEN SATURATION: 100 % | HEIGHT: 62 IN

## 2022-12-27 DIAGNOSIS — E78.2 MIXED HYPERLIPIDEMIA: ICD-10-CM

## 2022-12-27 DIAGNOSIS — E03.9 ACQUIRED HYPOTHYROIDISM: ICD-10-CM

## 2022-12-27 DIAGNOSIS — Z23 NEED FOR PNEUMOCOCCAL VACCINATION: ICD-10-CM

## 2022-12-27 DIAGNOSIS — I10 PRIMARY HYPERTENSION: Primary | ICD-10-CM

## 2022-12-27 PROCEDURE — 90677 PCV20 VACCINE IM: CPT | Performed by: NURSE PRACTITIONER

## 2022-12-27 PROCEDURE — 90471 IMMUNIZATION ADMIN: CPT | Performed by: NURSE PRACTITIONER

## 2022-12-27 PROCEDURE — 99214 OFFICE O/P EST MOD 30 MIN: CPT | Performed by: NURSE PRACTITIONER

## 2022-12-27 RX ORDER — CYCLOBENZAPRINE HCL 5 MG
TABLET ORAL
COMMUNITY
Start: 2022-11-21 | End: 2023-02-10

## 2022-12-27 RX ORDER — MELOXICAM 7.5 MG/1
TABLET ORAL
COMMUNITY
Start: 2022-10-15 | End: 2023-02-10

## 2022-12-27 RX ORDER — UBIDECARENONE 100 MG
100 CAPSULE ORAL DAILY
COMMUNITY

## 2022-12-27 NOTE — PROGRESS NOTES
"Subjective   Tierney Rolon is a 67 y.o. female presenting today for follow up of   Chief Complaint   Patient presents with   • Hypertension     F/u, had blood work done last week       History of Present Illness     Outpatient Medications Marked as Taking for the 12/27/22 encounter (Office Visit) with Cassie Callahan APRN   Medication Sig Dispense Refill   • aspirin 81 MG tablet Take  by mouth daily.     • atorvastatin (LIPITOR) 20 MG tablet TAKE 1 TABLET EVERY NIGHT 90 tablet 3   • coenzyme Q10 100 MG capsule Take 100 mg by mouth Daily.     • hydroCHLOROthiazide (HYDRODIURIL) 25 MG tablet TAKE 1 TABLET DAILY 90 tablet 3   • Misc Natural Products (OSTEO BI-FLEX ADV TRIPLE ST) tablet Take  by mouth.     • Omega-3 Fatty Acids (FISH OIL) 1000 MG capsule capsule Take 2,000 mg by mouth Daily.     • PARoxetine (PAXIL) 10 MG tablet TAKE 1 TABLET EVERY MORNING 90 tablet 3   • saccharomyces boulardii (Florastor) 250 MG capsule Take 1 capsule by mouth 2 (Two) Times a Day. 60 capsule 0   • Synthroid 50 MCG tablet TAKE 1 TABLET DAILY 90 tablet 3   • Ubiquinol 100 MG capsule          HTN - tolerating medication regimen w/o c/o; does not self monitor; denies CP, SOB, HA, or CP     HLD - tolerating statin and ASA w/o c/o     Thyroid - taking 50mcg of Levothyroxine      The following portions of the patient's history were reviewed and updated as appropriate: allergies, current medications, past family history, past medical history, past social history, past surgical history and problem list.        Objective   Vitals:    12/27/22 1237   BP: 126/68   BP Location: Left arm   Pulse: 96   Temp: 96.9 °F (36.1 °C)   TempSrc: Infrared   SpO2: 100%   Weight: 64.7 kg (142 lb 9.6 oz)   Height: 157.5 cm (62\")       BP Readings from Last 3 Encounters:   12/27/22 126/68   06/27/22 132/76   01/31/22 138/91        Wt Readings from Last 3 Encounters:   12/27/22 64.7 kg (142 lb 9.6 oz)   06/27/22 61.6 kg (135 lb 12.8 oz)   01/31/22 61.2 kg " (135 lb)        Body mass index is 26.08 kg/m².  Nursing notes and vitals reviewed.    Physical Exam  Constitutional:       General: She is not in acute distress.     Appearance: She is well-developed.   Neck:      Thyroid: No thyroid mass or thyromegaly.   Cardiovascular:      Rate and Rhythm: Regular rhythm.      Heart sounds: S1 normal and S2 normal.   Pulmonary:      Effort: Pulmonary effort is normal.      Breath sounds: Normal breath sounds.   Musculoskeletal:      Cervical back: Neck supple.   Lymphadenopathy:      Cervical: No cervical adenopathy.   Neurological:      Mental Status: She is alert and oriented to person, place, and time.   Psychiatric:         Attention and Perception: She is attentive.         Behavior: Behavior normal.         Thought Content: Thought content normal.         Recent Results (from the past 672 hour(s))   T4, Free    Collection Time: 12/20/22  8:24 AM    Specimen: Blood   Result Value Ref Range    Free T4 1.26 0.93 - 1.70 ng/dL   TSH    Collection Time: 12/20/22  8:24 AM    Specimen: Blood   Result Value Ref Range    TSH 2.990 0.270 - 4.200 uIU/mL   Lipid Panel With / Chol / HDL Ratio    Collection Time: 12/20/22  8:24 AM    Specimen: Blood   Result Value Ref Range    Total Cholesterol 183 0 - 200 mg/dL    Triglycerides 148 0 - 150 mg/dL    HDL Cholesterol 58 40 - 60 mg/dL    VLDL Cholesterol Trevor 26 5 - 40 mg/dL    LDL Chol Calc ([facility]) 99 0 - 100 mg/dL    Chol/HDL Ratio 3.16    Comprehensive Metabolic Panel    Collection Time: 12/20/22  8:24 AM    Specimen: Blood   Result Value Ref Range    Glucose 108 (H) 65 - 99 mg/dL    BUN 17 8 - 23 mg/dL    Creatinine 0.77 0.57 - 1.00 mg/dL    EGFR Result 85.7 >60.0 mL/min/1.73    BUN/Creatinine Ratio 22.1 7.0 - 25.0    Sodium 138 136 - 145 mmol/L    Potassium 3.9 3.5 - 5.2 mmol/L    Chloride 98 98 - 107 mmol/L    Total CO2 31.5 (H) 22.0 - 29.0 mmol/L    Calcium 9.4 8.6 - 10.5 mg/dL    Total Protein 6.4 6.0 - 8.5 g/dL    Albumin 4.60 3.50 -  5.20 g/dL    Globulin 1.8 gm/dL    A/G Ratio 2.6 g/dL    Total Bilirubin 0.4 0.0 - 1.2 mg/dL    Alkaline Phosphatase 92 39 - 117 U/L    AST (SGOT) 20 1 - 32 U/L    ALT (SGPT) 18 1 - 33 U/L         Assessment & Plan   Diagnoses and all orders for this visit:    1. Primary hypertension (Primary)  -     CBC (No Diff); Future  -     Comprehensive Metabolic Panel; Future    2. Mixed hyperlipidemia  -     Comprehensive Metabolic Panel; Future  -     Lipid Panel With / Chol / HDL Ratio; Future    3. Acquired hypothyroidism  -     TSH; Future  -     T4, Free; Future    4. Need for pneumococcal vaccination  -     Pneumococcal Conjugate Vaccine 20-Valent All        Chronic conditions are well controlled. No med changes.      Medications, including side effects, were discussed with the patient. Patient verbalized understanding.  The plan of care was discussed. All questions were answered. Patient verbalized understanding.      Return in about 6 months (around 6/27/2023) for fasting labs one week prior to, Annual physical.

## 2023-01-19 DIAGNOSIS — M51.26 LUMBAR DISC HERNIATION: Primary | ICD-10-CM

## 2023-01-24 DIAGNOSIS — M51.36 BULGING OF INTERVERTEBRAL DISC BETWEEN L4 AND L5: Primary | ICD-10-CM

## 2023-01-24 DIAGNOSIS — M54.42 CHRONIC BILATERAL LOW BACK PAIN WITH LEFT-SIDED SCIATICA: ICD-10-CM

## 2023-01-24 DIAGNOSIS — G89.29 CHRONIC BILATERAL LOW BACK PAIN WITH LEFT-SIDED SCIATICA: ICD-10-CM

## 2023-02-09 ENCOUNTER — TELEPHONE (OUTPATIENT)
Dept: INTERNAL MEDICINE | Facility: CLINIC | Age: 68
End: 2023-02-09
Payer: COMMERCIAL

## 2023-02-09 NOTE — TELEPHONE ENCOUNTER
Per Gisela @ the [facility] none of the notes reflect the need for the MRI. She needs something in the notes or we may need to cancel the MRI.

## 2023-02-10 ENCOUNTER — OFFICE VISIT (OUTPATIENT)
Dept: INTERNAL MEDICINE | Facility: CLINIC | Age: 68
End: 2023-02-10
Payer: COMMERCIAL

## 2023-02-10 VITALS
DIASTOLIC BLOOD PRESSURE: 68 MMHG | WEIGHT: 141 LBS | SYSTOLIC BLOOD PRESSURE: 116 MMHG | BODY MASS INDEX: 25.95 KG/M2 | HEIGHT: 62 IN | HEART RATE: 84 BPM | OXYGEN SATURATION: 97 % | TEMPERATURE: 97.3 F

## 2023-02-10 DIAGNOSIS — M54.16 LUMBAR RADICULOPATHY: ICD-10-CM

## 2023-02-10 DIAGNOSIS — G89.29 CHRONIC BILATERAL LOW BACK PAIN WITH LEFT-SIDED SCIATICA: ICD-10-CM

## 2023-02-10 DIAGNOSIS — M51.36 BULGING OF INTERVERTEBRAL DISC BETWEEN L4 AND L5: ICD-10-CM

## 2023-02-10 DIAGNOSIS — M51.26 LUMBAR DISC HERNIATION: Primary | ICD-10-CM

## 2023-02-10 DIAGNOSIS — M54.42 CHRONIC BILATERAL LOW BACK PAIN WITH LEFT-SIDED SCIATICA: ICD-10-CM

## 2023-02-10 PROCEDURE — 99214 OFFICE O/P EST MOD 30 MIN: CPT | Performed by: NURSE PRACTITIONER

## 2023-02-10 NOTE — PROGRESS NOTES
Subjective  Answers for HPI/ROS submitted by the patient on 2/9/2023  Please describe your symptoms.: I have a herniated disk between L4 and L5. It causes me severe and regular cramping in my left leg. It causes my left foot to invert, so severely that I ended up with a fractured fifth metatarsal. I am pursuing spinal surgery to get this problem corrected.  Have you had these symptoms before?: Yes  How long have you been having these symptoms?: Greater than 2 weeks  Please list any medications you are currently taking for this condition.: Tylenol PM as needed, but it doesn't give me much relief. I've also tried applying SalonPas to my left leg.  Please describe any probable cause for these symptoms. : The cause is a herniated disk between L4 and L5 in my spine.  What is the primary reason for your visit?: Whitney Rolon is a 67 y.o. female presenting today for follow up of   Chief Complaint   Patient presents with   • Back Pain       History of Present Illness     Outpatient Medications Marked as Taking for the 2/10/23 encounter (Office Visit) with Cassie Callahan APRN   Medication Sig Dispense Refill   • aspirin 81 MG tablet Take  by mouth daily.     • atorvastatin (LIPITOR) 20 MG tablet TAKE 1 TABLET EVERY NIGHT 90 tablet 3   • coenzyme Q10 100 MG capsule Take 100 mg by mouth Daily.     • hydroCHLOROthiazide (HYDRODIURIL) 25 MG tablet TAKE 1 TABLET DAILY 90 tablet 3   • Misc Natural Products (OSTEO BI-FLEX ADV TRIPLE ST) tablet Take  by mouth.     • Omega-3 Fatty Acids (FISH OIL) 1000 MG capsule capsule Take 2,000 mg by mouth Daily.     • PARoxetine (PAXIL) 10 MG tablet TAKE 1 TABLET EVERY MORNING 90 tablet 3   • saccharomyces boulardii (Florastor) 250 MG capsule Take 1 capsule by mouth 2 (Two) Times a Day. 60 capsule 0   • Synthroid 50 MCG tablet TAKE 1 TABLET DAILY 90 tablet 3   • Ubiquinol 100 MG capsule          Presents for f/u r/t chronic lumbar radiculopathy dating back to at least  03/2019.     Initial presentation:  Office Visit with Jessica Galan MD (06/27/2019)  F/B anti-inflammatory, muscle relaxer, and  PT interventions.    When pain did not improve was then referred to Pain Management.    11/18/2019 - Epidural w/ Dr. Lemos on 10/31, scheduled for #2 on 12/3. Did not see a whole lot of benefit from first but is able to stand for longer than 45 minutes.    Office Visit with Jessica Francisco APRN (01/07/2020)  Office Visit with Rimma Rodriguez APRN (02/24/2020)    05/18/2020 - Chronic LBP - 3rd epidural was cancelled d/t pandemic; needs to be rescheduled; PM referred to neurosurgery - Dr. Dhruv Martínez in [place] (has tx'ed numerous family members)    07/10/2020 Consult w/ Dr. Mauricio ARAUZ -   Recommendations: Ms. Tierney Rolon is a 64-year-old woman with left lower extremity pain for over 1 year that has not resolved with conservative treatment measures. She has a disc herniation on the left at L4-5, which is consistent with her symptoms. Given her MRI is nearly 1 year old, was recommended that she undergo new lumbar MRI. If the disc herniation remains, she will be considered for left L4-5 microdiscectomy. Please do not hesitate to call with any questions or concerns.    Office Visit with Rimma Rodriguez APRN (06/01/2021)  Office Visit with Rimma Rodriguez APRN (07/13/2021)    09/07/2021 [facility] -   It was a pleasure seeing your patient Tierney Rolon in the neurosurgery telemedicine clinic today for continued evaluation. As you know she is a 65-year-old woman who lives in [place] presents in one year ago with complaints of left leg pain extending laterally into the leg and lateral toes. Imaging demonstrated a far lateral disc herniation at L4-5 on the left. She elected to undergo nonsurgical management with physical therapy and injection therapy. She presents today stating that she continues to have progressively worsening pain in the left leg that is unrelieved with injection  "therapy physical therapy, ice heat or medication.    Exam is unable to be performed due to the nature of this visit.    Her MRI from 1 year ago was again reviewed that demonstrates a lateral disc herniation at L4-5 the left.    Mr. Rolon presents with complaints of left lateral leg pain due to a disc herniation at L4-5. She has failed nonsurgical treatment and is interested in pursuing surgical treatment with a left L4-5 microdiscectomy and foraminotomy. This will be pending a new lumbar MRI given given her most recent image is 1 year old. We will be scheduling her pending preoperative clearance.    All of the above information was reviewed with and verified by the patient.    At present pain is worsening she wishes to pursue surgery w/ a local provider. In order to facilitate that referral Dr. Conklin's officeius requesting updated MRI as her previous imaging is more than one year old. Her pain is 10/10 at it's worst. There is cramping in the calf and knee of the LLE. There is numbness in the 3rd and 4th toes of the LLE. This radiculopathy causing inversion of her L foot to the extreme that it has caused fracture.    The following portions of the patient's history were reviewed and updated as appropriate: allergies, current medications, past family history, past medical history, past social history, past surgical history and problem list.        Objective   Vitals:    02/10/23 0821   BP: 116/68   BP Location: Left arm   Pulse: 84   Temp: 97.3 °F (36.3 °C)   TempSrc: Infrared   SpO2: 97%   Weight: 64 kg (141 lb)   Height: 157.5 cm (62\")       BP Readings from Last 3 Encounters:   02/10/23 116/68   12/27/22 126/68   06/27/22 132/76        Wt Readings from Last 3 Encounters:   02/10/23 64 kg (141 lb)   12/27/22 64.7 kg (142 lb 9.6 oz)   06/27/22 61.6 kg (135 lb 12.8 oz)        Body mass index is 25.79 kg/m².  Nursing notes and vitals reviewed.    Physical Exam  Constitutional:       General: She is not in acute " distress.     Appearance: She is well-developed.   Cardiovascular:      Rate and Rhythm: Regular rhythm.      Heart sounds: S1 normal and S2 normal.   Pulmonary:      Effort: Pulmonary effort is normal.      Breath sounds: Normal breath sounds.   Neurological:      Mental Status: She is alert and oriented to person, place, and time.      Gait: Gait abnormal.   Psychiatric:         Attention and Perception: She is attentive.         Behavior: Behavior normal.         Thought Content: Thought content normal.           MRI Lumbar Spine Without Contrast (10/01/2021 11:45)  FINDINGS:  The sagittal alignment is satisfactory. Vertebral body heights are maintained. No evidence of marrow replacement or infiltration. The report assumes 5 lumbar type vertebral bodies. The conus terminates at the L1 level and is of normal contour and signal  intensity. The nerve roots of the cauda equina appear unremarkable.     At L5-S1, disc desiccation and mild loss of disc height. There is a small focal central disc herniation with associated annular fissure. Minimal effacement of the ventral thecal sac. No significant neural foraminal compromise. Mild facet arthropathy.     At L4-L5, disc desiccation and mild loss of disc height. Concentric disc bulge. Mild effacement of the ventral thecal sac. Mild facet arthropathy. Moderate compromise of the left neural foramen with mild narrowing of the right neural foramen.     At L3-L4, disc desiccation and mild loss of disc height. Concentric disc bulge. Effacement of the ventral thecal sac. Mild narrowing of the neural foramina bilaterally. Mild facet arthropathy.     At L2-L3, disc desiccation and mild loss of disc height. Mild facet arthropathy. No spinal canal or neural foraminal compromise.     At L1-L2, no significant disc degeneration. No facet arthropathy. No spinal canal or neural foraminal compromise.         Assessment & Plan   Diagnoses and all orders for this visit:    1. Lumbar disc  herniation (Primary)    2. Chronic bilateral low back pain with left-sided sciatica    3. Bulging of intervertebral disc between L4 and L5    4. Lumbar radiculopathy        MRI is scheduled for 02/13/2023 w/ consult w/ NS pending result.      The plan of care was discussed. All questions were answered. Patient verbalized understanding.      I spent 30 minutes caring for Tierney on this date of service. This time includes time spent by me in the following activities:preparing for the visit, reviewing tests, obtaining and/or reviewing a separately obtained history, performing a medically appropriate examination and/or evaluation , counseling and educating the patient/family/caregiver, ordering medications, tests, or procedures, referring and communicating with other health care professionals , documenting information in the medical record, independently interpreting results and communicating that information with the patient/family/caregiver and care coordination

## 2023-02-13 ENCOUNTER — HOSPITAL ENCOUNTER (OUTPATIENT)
Dept: MRI IMAGING | Facility: HOSPITAL | Age: 68
Discharge: HOME | End: 2023-02-13
Admitting: NURSE PRACTITIONER
Payer: MEDICARE

## 2023-02-13 DIAGNOSIS — M51.36 BULGING OF INTERVERTEBRAL DISC BETWEEN L4 AND L5: ICD-10-CM

## 2023-02-13 DIAGNOSIS — G89.29 CHRONIC BILATERAL LOW BACK PAIN WITH LEFT-SIDED SCIATICA: ICD-10-CM

## 2023-02-13 DIAGNOSIS — M54.42 CHRONIC BILATERAL LOW BACK PAIN WITH LEFT-SIDED SCIATICA: ICD-10-CM

## 2023-02-13 PROCEDURE — 72148 MRI LUMBAR SPINE W/O DYE: CPT

## 2023-03-09 ENCOUNTER — TRANSCRIBE ORDERS (OUTPATIENT)
Dept: ADMINISTRATIVE | Facility: HOSPITAL | Age: 68
End: 2023-03-09
Payer: COMMERCIAL

## 2023-03-09 DIAGNOSIS — Z12.31 ENCOUNTER FOR SCREENING MAMMOGRAM FOR MALIGNANT NEOPLASM OF BREAST: Primary | ICD-10-CM

## 2023-03-21 NOTE — H&P (VIEW-ONLY)
Subjective   Patient ID: Tierney Rolon is a 67 y.o. female is being seen for consultation today at the request of JACKSON Bautista* lumbar MRI 2/13/23    Treatment: PT, Pain management   Today patient states she is feeling ok today. Patient states she has has left leg pain in the past.     Patient, Provider, and MA are all wearing a mask in our office today.     History of Present Illness    This patient has been having pain in her back with radiation into her left leg for several years now.  She was actually scheduled for a microdiscectomy in 2020 but ended up not having it because of COVID.  Subsequent to that she has been treated with multiple injections in her back physical therapy and medications.  She is still having the pain in her leg and she has some weakness in her everters.  She has no trouble with the right leg and no difficulty with bowel bladder control.    The following portions of the patient's history were reviewed and updated as appropriate: allergies, current medications, past family history, past medical history, past social history, past surgical history and problem list.    Review of Systems   Constitutional: Positive for chills.   HENT: Positive for congestion.    Eyes: Negative for visual disturbance.   Musculoskeletal: Positive for myalgias (left leg ). Negative for back pain and gait problem.   Neurological: Positive for numbness (toes ). Negative for dizziness, weakness, light-headedness and headaches.       I reviewed the review of systems listed by the patient and discussed by my MA    Objective     Vitals:    04/06/23 1433   BP: 122/78   Pulse: 89   Temp: 97.1 °F (36.2 °C)   SpO2: 98%     There is no height or weight on file to calculate BMI.    Tobacco Use: Low Risk    • Smoking Tobacco Use: Never   • Smokeless Tobacco Use: Never   • Passive Exposure: Never          Physical Exam  Constitutional:       Appearance: She is well-developed.   HENT:      Head: Normocephalic and  atraumatic.   Eyes:      Extraocular Movements: EOM normal.      Conjunctiva/sclera: Conjunctivae normal.      Pupils: Pupils are equal, round, and reactive to light.   Neck:      Vascular: No carotid bruit.   Neurological:      Mental Status: She is oriented to person, place, and time.      Coordination: Finger-Nose-Finger Test and Heel to Shin Test normal.      Gait: Gait is intact.      Deep Tendon Reflexes:      Reflex Scores:       Tricep reflexes are 2+ on the right side and 2+ on the left side.       Bicep reflexes are 2+ on the right side and 2+ on the left side.       Brachioradialis reflexes are 2+ on the right side and 2+ on the left side.       Patellar reflexes are 2+ on the right side and 2+ on the left side.       Achilles reflexes are 2+ on the right side and 2+ on the left side.  Psychiatric:         Speech: Speech normal.       Neurologic Exam     Mental Status   Oriented to person, place, and time.   Registration of memory: Good recent and remote memory.   Attention: normal. Concentration: normal.   Speech: speech is normal   Level of consciousness: alert  Knowledge: consistent with education.     Cranial Nerves     CN II   Visual fields full to confrontation.   Visual acuity: normal    CN III, IV, VI   Pupils are equal, round, and reactive to light.  Extraocular motions are normal.     CN V   Facial sensation intact.   Right corneal reflex: normal  Left corneal reflex: normal    CN VII   Facial expression full, symmetric.   Right facial weakness: none  Left facial weakness: none    CN VIII   Hearing: intact    CN IX, X   Palate: symmetric    CN XI   Right sternocleidomastoid strength: normal  Left sternocleidomastoid strength: normal    CN XII   Tongue: not atrophic  Tongue deviation: none    Motor Exam   Muscle bulk: normal  Right arm tone: normal  Left arm tone: normal  Right leg tone: normal  Left leg tone: normal    Strength   Strength 5/5 except as noted.   Left anterior tibial: 4/5  Left  posterior tibial: 4/5  Left peroneal: 4/5    Sensory Exam   Light touch normal.     Gait, Coordination, and Reflexes     Gait  Gait: normal    Coordination   Finger to nose coordination: normal  Heel to shin coordination: normal    Reflexes   Right brachioradialis: 2+  Left brachioradialis: 2+  Right biceps: 2+  Left biceps: 2+  Right triceps: 2+  Left triceps: 2+  Right patellar: 2+  Left patellar: 2+  Right achilles: 2+  Left achilles: 2+  Right : 2+  Left : 2+          Assessment & Plan   Independent Review of Radiographic Studies:      I personally reviewed the images from the following studies.    I reviewed an MRI of her lumbar spine done in February of this year.  This does show some disc bulging at L4-5 particularly on the left side into the neuroforamen but it does not really look like it is compressing the nerve.    Medical Decision Making:      I told the patient that from my point of view I am not comfortable proceeding with surgery based on this MRI.  I recommended that we go ahead with a lumbar myelogram.  I told the patient what a myelogram involves.  I explained that there is a 50% chance of developing a bad headache and nausea as a result of the test.  I explained that there is also a very small chance of infection, seizures, and bleeding.  I explained how we would treat a post myelogram headache including bedrest, caffeinated fluids, steroids, and blood patch.  The patient does ask to proceed.    Diagnoses and all orders for this visit:    1. Chronic bilateral low back pain with left-sided sciatica (Primary)  -     Obtain Informed Consent; Standing  -     IR Myelogram Lumbar Spine; Future  -     CT Lumbar Spine With Intrathecal Contrast; Future  -     XR Spine Lumbar Complete With Flex & Ext; Future  -     No Lab Testing Needed; Standing      Return for After radiology test.

## 2023-03-27 ENCOUNTER — HOSPITAL ENCOUNTER (OUTPATIENT)
Dept: MAMMOGRAPHY | Facility: HOSPITAL | Age: 68
Discharge: HOME | End: 2023-03-27
Admitting: NURSE PRACTITIONER
Payer: MEDICARE

## 2023-03-27 DIAGNOSIS — Z12.31 ENCOUNTER FOR SCREENING MAMMOGRAM FOR MALIGNANT NEOPLASM OF BREAST: ICD-10-CM

## 2023-03-27 PROCEDURE — 77067 SCR MAMMO BI INCL CAD: CPT

## 2023-03-27 PROCEDURE — 77063 BREAST TOMOSYNTHESIS BI: CPT

## 2023-04-06 ENCOUNTER — OFFICE VISIT (OUTPATIENT)
Dept: NEUROSURGERY | Facility: CLINIC | Age: 68
End: 2023-04-06
Payer: MEDICARE

## 2023-04-06 VITALS
DIASTOLIC BLOOD PRESSURE: 78 MMHG | OXYGEN SATURATION: 98 % | HEART RATE: 89 BPM | TEMPERATURE: 97.1 F | SYSTOLIC BLOOD PRESSURE: 122 MMHG

## 2023-04-06 DIAGNOSIS — G89.29 CHRONIC BILATERAL LOW BACK PAIN WITH LEFT-SIDED SCIATICA: Primary | ICD-10-CM

## 2023-04-06 DIAGNOSIS — M54.42 CHRONIC BILATERAL LOW BACK PAIN WITH LEFT-SIDED SCIATICA: Primary | ICD-10-CM

## 2023-04-06 PROCEDURE — 99204 OFFICE O/P NEW MOD 45 MIN: CPT | Performed by: NEUROLOGICAL SURGERY

## 2023-04-06 PROCEDURE — 1160F RVW MEDS BY RX/DR IN RCRD: CPT | Performed by: NEUROLOGICAL SURGERY

## 2023-04-06 PROCEDURE — 3074F SYST BP LT 130 MM HG: CPT | Performed by: NEUROLOGICAL SURGERY

## 2023-04-06 PROCEDURE — 1159F MED LIST DOCD IN RCRD: CPT | Performed by: NEUROLOGICAL SURGERY

## 2023-04-06 PROCEDURE — 3078F DIAST BP <80 MM HG: CPT | Performed by: NEUROLOGICAL SURGERY

## 2023-04-11 ENCOUNTER — TELEPHONE (OUTPATIENT)
Dept: NEUROSURGERY | Facility: CLINIC | Age: 68
End: 2023-04-11
Payer: COMMERCIAL

## 2023-04-11 DIAGNOSIS — M54.42 CHRONIC BILATERAL LOW BACK PAIN WITH LEFT-SIDED SCIATICA: Primary | ICD-10-CM

## 2023-04-11 DIAGNOSIS — Z91.041 CONTRAST MEDIA ALLERGY: ICD-10-CM

## 2023-04-11 DIAGNOSIS — G89.29 CHRONIC BILATERAL LOW BACK PAIN WITH LEFT-SIDED SCIATICA: Primary | ICD-10-CM

## 2023-04-11 NOTE — TELEPHONE ENCOUNTER
Patient called about medication to take prior to myelogram. Decadron was not routed to her pharmacy. Its flagged in the system as being in the same drug class as prednisone. Patient don't know if she can take this either. Any other options? Myelogram scheduled for this Thursday.

## 2023-04-11 NOTE — TELEPHONE ENCOUNTER
LVM for patient to call the office back. Please see Dr. Conklin's response. [facility] okay to give message.

## 2023-04-11 NOTE — TELEPHONE ENCOUNTER
Caller: Piper Rolon    Relationship to patient: Emergency Contact    Best call back number: [phone]    Patient is needing:     PATIENT AT PHARMACY STATES THERE WAS SUPPOSED TO BE A MEDICATION CALLED IN FOR THE PATIENT TO TAKE 'TWO TABLETS BEFORE HER MYELOGRAM'  HOWEVER NO MEDICATION WAS CALLED IN.    PLEASE CALL TO ADVISE

## 2023-04-12 NOTE — TELEPHONE ENCOUNTER
Patient is aware of previous messages. She returned call again asking about medication for tomorrow.

## 2023-04-13 ENCOUNTER — HOSPITAL ENCOUNTER (OUTPATIENT)
Dept: CT IMAGING | Facility: HOSPITAL | Age: 68
Discharge: HOME | End: 2023-04-13
Payer: COMMERCIAL

## 2023-04-13 ENCOUNTER — HOSPITAL ENCOUNTER (OUTPATIENT)
Dept: GENERAL RADIOLOGY | Facility: HOSPITAL | Age: 68
Discharge: HOME | End: 2023-04-13
Payer: COMMERCIAL

## 2023-04-13 VITALS
RESPIRATION RATE: 16 BRPM | BODY MASS INDEX: 25.21 KG/M2 | HEIGHT: 62 IN | OXYGEN SATURATION: 97 % | SYSTOLIC BLOOD PRESSURE: 116 MMHG | WEIGHT: 137 LBS | TEMPERATURE: 98 F | HEART RATE: 77 BPM | DIASTOLIC BLOOD PRESSURE: 80 MMHG

## 2023-04-13 DIAGNOSIS — M54.42 CHRONIC BILATERAL LOW BACK PAIN WITH LEFT-SIDED SCIATICA: ICD-10-CM

## 2023-04-13 DIAGNOSIS — G89.29 CHRONIC BILATERAL LOW BACK PAIN WITH LEFT-SIDED SCIATICA: ICD-10-CM

## 2023-04-13 PROCEDURE — 62304 MYELOGRAPHY LUMBAR INJECTION: CPT

## 2023-04-13 PROCEDURE — 72114 X-RAY EXAM L-S SPINE BENDING: CPT

## 2023-04-13 PROCEDURE — 62284 INJECTION FOR MYELOGRAM: CPT | Performed by: NEUROLOGICAL SURGERY

## 2023-04-13 PROCEDURE — 72240 MYELOGRAPHY NECK SPINE: CPT

## 2023-04-13 PROCEDURE — 72132 CT LUMBAR SPINE W/DYE: CPT

## 2023-04-13 PROCEDURE — 25510000001 IOPAMIDOL 41 % SOLUTION: Performed by: NEUROLOGICAL SURGERY

## 2023-04-13 PROCEDURE — 0 LIDOCAINE 1 % SOLUTION: Performed by: NEUROLOGICAL SURGERY

## 2023-04-13 RX ORDER — IBUPROFEN 800 MG/1
TABLET ORAL
COMMUNITY
Start: 2023-04-11

## 2023-04-13 RX ORDER — LIDOCAINE HYDROCHLORIDE 10 MG/ML
10 INJECTION, SOLUTION INFILTRATION; PERINEURAL ONCE
Status: COMPLETED | OUTPATIENT
Start: 2023-04-13 | End: 2023-04-13

## 2023-04-13 RX ORDER — AMOXICILLIN 500 MG/1
CAPSULE ORAL
COMMUNITY
Start: 2023-04-11

## 2023-04-13 RX ORDER — FLUOROURACIL 50 MG/G
CREAM TOPICAL
COMMUNITY
Start: 2023-03-21

## 2023-04-13 RX ORDER — ACETAMINOPHEN 325 MG/1
650 TABLET ORAL EVERY 4 HOURS PRN
Status: CANCELLED | OUTPATIENT
Start: 2023-04-13

## 2023-04-13 RX ORDER — HYDROCODONE BITARTRATE AND ACETAMINOPHEN 5; 325 MG/1; MG/1
1 TABLET ORAL EVERY 4 HOURS PRN
Status: CANCELLED | OUTPATIENT
Start: 2023-04-13 | End: 2023-04-23

## 2023-04-13 RX ORDER — HYDROCODONE BITARTRATE AND ACETAMINOPHEN 5; 325 MG/1; MG/1
TABLET ORAL
COMMUNITY
Start: 2023-04-11

## 2023-04-13 RX ADMIN — LIDOCAINE HYDROCHLORIDE 5 ML: 10 INJECTION, SOLUTION INFILTRATION; PERINEURAL at 07:48

## 2023-04-13 RX ADMIN — IOPAMIDOL 20 ML: 408 INJECTION, SOLUTION INTRATHECAL at 07:49

## 2023-04-13 NOTE — DISCHARGE INSTRUCTIONS
EDUCATION /DISCHARGE INSTRUCTIONS:    A myelogram is a special radiology procedure of the spinal cord, spinal nerves and other related structures.  You will be awake during the examination.  An area of your lower back will be cleansed with an antiseptic solution.  The physician will inject a numbing medication in your lower back.  While your back is numb, a needle will be placed in the lower back area.  A small amount of spinal fluid may be withdrawn and sent to the lab if ordered by your physician. While the needle is in the back, an injection of a contrast material (xray dye) will be given through the needle.  The contrast material will allow the physician to see the spinal cord and spinal nerves.  Once injected, the needle will be removed and a band aid will be placed over the injection site.  The table will be tilted during the process to allow the contrast material to flow to particular areas in the spine.  Following the injection and xrays, you will be taken to the CT scanner where more pictures will be taken. After the procedure is finished, the contrast material will be absorbed by your body and eliminated through your kidneys.  The radiologist will study and interpret your myelogram and send the results to your physician.  Procedure risks of a myelogram include, but are not limited to:  *  Bleeding   *  Seizure  *  Infection   *  Headache, possibly severe requiring a blood patch  *  Contrast reaction  *  Nerve or cord injury  *  Paralysis and death    Benefits of the procedure:  *  Best examination for delineating pathology related to spinal cord compression from a disc and/or nerve root compression  Alternatives to the procedure:  MRI - a non invasive procedure requiring intravenous contrast injection.  Cannot be done on patients with certain pacemakers or metal in the body.  MRI risks include possible reaction to the contrast material, movement of metal located in the body.Benefit to MRI:  Non-invasive  and usually painless procedure.  THIS EDUCATION INFORMATION WAS REVIEWED PRIOR TO PROCEDURE AND CONSENT. Patient initials___DG / PG RN_____________Time____0615______________    24 hour rest period ends _1100 on 4/14/23___________________.    Important information following your myelogram:  * ACTIVITY:   *  You may sit up in the car to go home.  *  When you get home, remain on bed rest (flat on your back or on your side) for 24 hours. You may place a rolled up towel under your neck for support  * You may get up to the bathroom and sit up to eat and drink then lie back down  * Drink additional fluids for 24 hours after the myelogram.   * Continue to drink additional fluids for the next 2-3 days. Water and caffeinated beverages are encouraged.  * Remain less active for the next two to three days.  * Do not drive for 24 hours following a myelogram.  * You may remove the bandage and shower in the morning.  *Resume taking your aspirin today      CALL YOUR PHYSICIAN FOR THE FOLLOWING:  * Pain at the injection site  * Redness, swelling, bruising or drainage at the injection site.  * A fever by mouth of 101.0 or any new symptoms  Headaches are a common side effect after a myelogram.  If you get a headache, you should stay flat in bed and drink plenty of fluids. If the headache persists and does not go away with rest/medication, CALL Dr. Conklin at [phone]

## 2023-04-13 NOTE — NURSING NOTE
Patient dressed and to wheelchair, taken to patient discharge with staff member, Her  is driving her home now.

## 2023-04-14 ENCOUNTER — TELEPHONE (OUTPATIENT)
Dept: INTERVENTIONAL RADIOLOGY/VASCULAR | Facility: HOSPITAL | Age: 68
End: 2023-04-14
Payer: COMMERCIAL

## 2023-04-17 NOTE — PROGRESS NOTES
"Subjective   Patient ID: Tierney Rolon is a 67 y.o. female is here today for follow-up with a new lumbar myelogram.     History of Present Illness    This patient returns today.  She continues with pain in her back with radiation into her left leg.  She feels she has some weakness in her everters on the left.  The right leg is fine.    The following portions of the patient's history were reviewed and updated as appropriate: allergies, current medications, past family history, past medical history, past social history, past surgical history and problem list.    Review of Systems    I reviewed the review of systems listed by the patient and discussed by my MA    Objective     Vitals:    04/20/23 1449   BP: 136/72   Pulse: 60   SpO2: 96%   Weight: 64.7 kg (142 lb 9.6 oz)   Height: 157.5 cm (62\")     Body mass index is 26.08 kg/m².    Tobacco Use: Low Risk    • Smoking Tobacco Use: Never   • Smokeless Tobacco Use: Never   • Passive Exposure: Never          Physical Exam  Neurological:      Mental Status: She is alert and oriented to person, place, and time.       Neurologic Exam     Mental Status   Oriented to person, place, and time.           Assessment & Plan   Independent Review of Radiographic Studies:      I personally reviewed the images from the following studies.    I reviewed her plain films, myelogram, and CT scan myself.  The plain films show some degenerative disease but no evidence of instability on flexion and extension.  On the myelogram itself there is pretty good nerve root filling throughout the lumbar spine on the prone films although at L4-5 there does appear to be some lateral recess stenosis and on the lateral film some posterior bulging.  On the weightbearing films there is a definite nerve root cut out at L4-5 on the left.  On the post myelographic CT scan the lower thoracic spine looks okay down to L2.  L2-3 shows a little disc bulging on the right side but not severe.  L3-4 also shows a " little disc bulging on the right side but not severe.  L4-5 shows a little narrowing and may be some foraminal stenosis on the left.  It is certainly not severe.  L5-S1 looks okay to me.    We can certainly try surgery on the left at L4-5 but she has to understand the chances of a good result are reduced.    Medical Decision Making:      I told the patient about the imaging.  I told her that from my point of view I really do not think we should do surgery.  I did suggest that we check an EMG of her left leg just to be sure that there is not peripheral entrapment.  I told her that we could consider surgery at L4-5 but because the pressure on the nerve is quite mild he will make the chances of a good result lower.  We will discuss this again when she returns after the EMG.    Diagnoses and all orders for this visit:    1. Chronic bilateral low back pain with left-sided sciatica (Primary)  -     EMG & Nerve Conduction Test; Future      Return for After EMG.

## 2023-04-20 ENCOUNTER — OFFICE VISIT (OUTPATIENT)
Dept: NEUROSURGERY | Facility: CLINIC | Age: 68
End: 2023-04-20
Payer: MEDICARE

## 2023-04-20 VITALS
OXYGEN SATURATION: 96 % | WEIGHT: 142.6 LBS | HEIGHT: 62 IN | SYSTOLIC BLOOD PRESSURE: 136 MMHG | HEART RATE: 60 BPM | BODY MASS INDEX: 26.24 KG/M2 | DIASTOLIC BLOOD PRESSURE: 72 MMHG

## 2023-04-20 DIAGNOSIS — G89.29 CHRONIC BILATERAL LOW BACK PAIN WITH LEFT-SIDED SCIATICA: Primary | ICD-10-CM

## 2023-04-20 DIAGNOSIS — M54.42 CHRONIC BILATERAL LOW BACK PAIN WITH LEFT-SIDED SCIATICA: Primary | ICD-10-CM

## 2023-04-20 PROCEDURE — 1160F RVW MEDS BY RX/DR IN RCRD: CPT | Performed by: NEUROLOGICAL SURGERY

## 2023-04-20 PROCEDURE — 99214 OFFICE O/P EST MOD 30 MIN: CPT | Performed by: NEUROLOGICAL SURGERY

## 2023-04-20 PROCEDURE — 3075F SYST BP GE 130 - 139MM HG: CPT | Performed by: NEUROLOGICAL SURGERY

## 2023-04-20 PROCEDURE — 1159F MED LIST DOCD IN RCRD: CPT | Performed by: NEUROLOGICAL SURGERY

## 2023-04-20 PROCEDURE — 3078F DIAST BP <80 MM HG: CPT | Performed by: NEUROLOGICAL SURGERY

## 2023-05-01 RX ORDER — HYDROCHLOROTHIAZIDE 25 MG/1
TABLET ORAL
Qty: 90 TABLET | Refills: 3 | Status: SHIPPED | OUTPATIENT
Start: 2023-05-01

## 2023-05-08 DIAGNOSIS — Z78.0 MENOPAUSE: ICD-10-CM

## 2023-05-08 RX ORDER — PAROXETINE 10 MG/1
TABLET, FILM COATED ORAL
Qty: 90 TABLET | Refills: 3 | Status: SHIPPED | OUTPATIENT
Start: 2023-05-08

## 2023-06-15 DIAGNOSIS — E78.2 MIXED HYPERLIPIDEMIA: ICD-10-CM

## 2023-06-15 DIAGNOSIS — I10 PRIMARY HYPERTENSION: ICD-10-CM

## 2023-06-15 DIAGNOSIS — E03.9 ACQUIRED HYPOTHYROIDISM: ICD-10-CM

## 2023-06-26 LAB
ALBUMIN SERPL-MCNC: 4.8 G/DL (ref 3.5–5.2)
ALBUMIN/GLOB SERPL: 2.5 G/DL
ALP SERPL-CCNC: 90 U/L (ref 39–117)
ALT SERPL-CCNC: 23 U/L (ref 1–33)
AST SERPL-CCNC: 21 U/L (ref 1–32)
BILIRUB SERPL-MCNC: 0.5 MG/DL (ref 0–1.2)
BUN SERPL-MCNC: 15 MG/DL (ref 8–23)
BUN/CREAT SERPL: 19.2 (ref 7–25)
CALCIUM SERPL-MCNC: 9.7 MG/DL (ref 8.6–10.5)
CHLORIDE SERPL-SCNC: 103 MMOL/L (ref 98–107)
CHOLEST SERPL-MCNC: 174 MG/DL (ref 0–200)
CHOLEST/HDLC SERPL: 2.68 {RATIO}
CO2 SERPL-SCNC: 27 MMOL/L (ref 22–29)
CREAT SERPL-MCNC: 0.78 MG/DL (ref 0.57–1)
EGFRCR SERPLBLD CKD-EPI 2021: 84.4 ML/MIN/1.73
ERYTHROCYTE [DISTWIDTH] IN BLOOD BY AUTOMATED COUNT: 12.5 % (ref 12.3–15.4)
GLOBULIN SER CALC-MCNC: 1.9 GM/DL
GLUCOSE SERPL-MCNC: 95 MG/DL (ref 65–99)
HCT VFR BLD AUTO: 38.9 % (ref 34–46.6)
HDLC SERPL-MCNC: 65 MG/DL (ref 40–60)
HGB BLD-MCNC: 14.2 G/DL (ref 12–15.9)
LDLC SERPL CALC-MCNC: 87 MG/DL (ref 0–100)
MCH RBC QN AUTO: 33.7 PG (ref 26.6–33)
MCHC RBC AUTO-ENTMCNC: 36.5 G/DL (ref 31.5–35.7)
MCV RBC AUTO: 92.4 FL (ref 79–97)
PLATELET # BLD AUTO: 223 10*3/MM3 (ref 140–450)
POTASSIUM SERPL-SCNC: 4.1 MMOL/L (ref 3.5–5.2)
PROT SERPL-MCNC: 6.7 G/DL (ref 6–8.5)
RBC # BLD AUTO: 4.21 10*6/MM3 (ref 3.77–5.28)
SODIUM SERPL-SCNC: 141 MMOL/L (ref 136–145)
T4 FREE SERPL-MCNC: 1.2 NG/DL (ref 0.93–1.7)
TRIGL SERPL-MCNC: 123 MG/DL (ref 0–150)
TSH SERPL DL<=0.005 MIU/L-ACNC: 1.77 UIU/ML (ref 0.27–4.2)
VLDLC SERPL CALC-MCNC: 22 MG/DL (ref 5–40)
WBC # BLD AUTO: 5.9 10*3/MM3 (ref 3.4–10.8)

## 2023-07-17 ENCOUNTER — TELEPHONE (OUTPATIENT)
Dept: INTERNAL MEDICINE | Facility: CLINIC | Age: 68
End: 2023-07-17

## 2023-07-17 NOTE — TELEPHONE ENCOUNTER
Forgot to schedule this patient for labwork, prior to visit w/Cassie on 01/18/2024. Need to make for her. Okay for Hub to read and/or schedule.

## 2023-08-01 ENCOUNTER — HOSPITAL ENCOUNTER (EMERGENCY)
Facility: HOSPITAL | Age: 68
Discharge: HOME | End: 2023-08-01
Attending: EMERGENCY MEDICINE | Admitting: EMERGENCY MEDICINE
Payer: COMMERCIAL

## 2023-08-01 ENCOUNTER — APPOINTMENT (OUTPATIENT)
Dept: GENERAL RADIOLOGY | Facility: HOSPITAL | Age: 68
End: 2023-08-01
Payer: COMMERCIAL

## 2023-08-01 VITALS
SYSTOLIC BLOOD PRESSURE: 139 MMHG | RESPIRATION RATE: 18 BRPM | HEIGHT: 62 IN | BODY MASS INDEX: 26.09 KG/M2 | TEMPERATURE: 98 F | WEIGHT: 141.8 LBS | DIASTOLIC BLOOD PRESSURE: 74 MMHG | HEART RATE: 71 BPM | OXYGEN SATURATION: 99 %

## 2023-08-01 DIAGNOSIS — S61.215A LACERATION OF LEFT RING FINGER WITHOUT DAMAGE TO NAIL, FOREIGN BODY PRESENCE UNSPECIFIED, INITIAL ENCOUNTER: Primary | ICD-10-CM

## 2023-08-01 PROCEDURE — 90471 IMMUNIZATION ADMIN: CPT

## 2023-08-01 PROCEDURE — 25010000002 BUPIVACAINE (PF) 0.5 % SOLUTION

## 2023-08-01 PROCEDURE — 25010000002 TETANUS-DIPHTH-ACELL PERTUSSIS 5-2.5-18.5 LF-MCG/0.5 SUSPENSION PREFILLED SYRINGE

## 2023-08-01 PROCEDURE — 99283 EMERGENCY DEPT VISIT LOW MDM: CPT

## 2023-08-01 PROCEDURE — 90715 TDAP VACCINE 7 YRS/> IM: CPT

## 2023-08-01 PROCEDURE — 73140 X-RAY EXAM OF FINGER(S): CPT

## 2023-08-01 RX ORDER — IBUPROFEN 200 MG
1 TABLET ORAL 3 TIMES DAILY
Qty: 21 G | Refills: 0 | Status: SHIPPED | OUTPATIENT
Start: 2023-08-01 | End: 2023-08-08

## 2023-08-01 RX ORDER — LIDOCAINE HYDROCHLORIDE 20 MG/ML
10 INJECTION, SOLUTION INFILTRATION; PERINEURAL ONCE
Status: COMPLETED | OUTPATIENT
Start: 2023-08-01 | End: 2023-08-01

## 2023-08-01 RX ORDER — BUPIVACAINE HYDROCHLORIDE 5 MG/ML
10 INJECTION, SOLUTION EPIDURAL; INTRACAUDAL ONCE
Status: COMPLETED | OUTPATIENT
Start: 2023-08-01 | End: 2023-08-01

## 2023-08-01 RX ADMIN — TETANUS TOXOID, REDUCED DIPHTHERIA TOXOID AND ACELLULAR PERTUSSIS VACCINE, ADSORBED 0.5 ML: 5; 2.5; 8; 8; 2.5 SUSPENSION INTRAMUSCULAR at 18:04

## 2023-08-01 RX ADMIN — BUPIVACAINE HYDROCHLORIDE 7 ML: 5 INJECTION, SOLUTION EPIDURAL; INTRACAUDAL; PERINEURAL at 17:32

## 2023-08-01 RX ADMIN — LIDOCAINE HYDROCHLORIDE 8 ML: 20 INJECTION, SOLUTION INFILTRATION; PERINEURAL at 17:32

## 2023-08-03 ENCOUNTER — HOSPITAL ENCOUNTER (OUTPATIENT)
Dept: INFUSION THERAPY | Facility: HOSPITAL | Age: 68
Discharge: HOME | End: 2023-08-03
Admitting: PSYCHIATRY & NEUROLOGY
Payer: COMMERCIAL

## 2023-08-03 DIAGNOSIS — G89.29 CHRONIC BILATERAL LOW BACK PAIN WITH LEFT-SIDED SCIATICA: ICD-10-CM

## 2023-08-03 DIAGNOSIS — M54.42 CHRONIC BILATERAL LOW BACK PAIN WITH LEFT-SIDED SCIATICA: ICD-10-CM

## 2023-08-03 PROCEDURE — 95886 MUSC TEST DONE W/N TEST COMP: CPT

## 2023-08-03 PROCEDURE — 95909 NRV CNDJ TST 5-6 STUDIES: CPT | Performed by: PSYCHIATRY & NEUROLOGY

## 2023-08-03 PROCEDURE — 95886 MUSC TEST DONE W/N TEST COMP: CPT | Performed by: PSYCHIATRY & NEUROLOGY

## 2023-08-03 PROCEDURE — 95909 NRV CNDJ TST 5-6 STUDIES: CPT

## 2023-08-03 NOTE — PROCEDURES
EMG and Nerve Conduction Studies    I.      Instrument used: Neuromax 1002  II.     Please see data sheets for tabular summary of NCS and details on methods, temperatures and lab standards.   III.    EMG muscles tested for upper extremity studies include the deltoid, biceps, triceps, pronator teres, extensor digitorum communis, first dorsal interosseous and abductor pollicis brevis.    IV.   EMG muscles tested for lower extremity studies include the vastus lateralis, tibialis anterior, peroneus longus, medial gastrocnemius and extensor digitorum brevis.    V.    Additional muscles tested as needed.  Paraspinal muscles tested as needed.   VI.   Please see data sheets for tabular summary of EMG findings.   VII. The complete report includes the data sheets.      Indication: Pain and cramping in the left leg beginning 3/2019 starting at the left hip.  Mostly cramping is in the calf peroneal muscles of the foot and hamstrings.  Minor cramping in the quads.  She denies low back pain.  She apparently has a disc bulge at L4/5 which does not appear surgical.  She has hypothyroidism which is treated.  She takes atorvastatin for high cholesterol.  She does not have cramping in other muscles in other limbs.  History: As above      Ht: 157.5 cm  Wt: 64.3 kg; BMI 25.94  HbA1C:   Lab Results   Component Value Date    HGBA1C 5.10 05/13/2021     TSH:   Lab Results   Component Value Date    TSH 1.770 06/26/2023       Technical summary:  Nerve conduction studies were obtained in the left leg with 1 comparison on the right.  Skin temperatures were a bit cold and temperature correction was used as indicated.  Needle examination was obtained on selected muscles of the left leg.    Results:  1.  Normal left sural sensory distal latency and amplitude.  2.  Normal left superficial peroneal sensory distal latency with temperature correction and normal amplitude.  Normal right superficial peroneal sensory distal latency and amplitude.  3.   Normal left peroneal motor conduction velocities with a normal distal latency and amplitudes.  4.  Normal left tibial motor conduction velocity with a normal distal latency and amplitudes.  5.  Needle examination of selected muscles of the left leg showed normal insertional activities throughout.  There were normal motor units and recruitment patterns throughout.  Lumbar paraspinals at L5 showed no abnormality.    Impression:  Normal study.  No evidence of peripheral neuropathy or a sciatic neuropathy or left lumbosacral radiculopathy by this study.  Study results were discussed with the patient.    Esteban Jacobsen M.D.              Dictated utilizing Dragon dictation.

## 2023-08-07 ENCOUNTER — OFFICE VISIT (OUTPATIENT)
Dept: INTERNAL MEDICINE | Facility: CLINIC | Age: 68
End: 2023-08-07
Payer: COMMERCIAL

## 2023-08-07 VITALS
HEART RATE: 79 BPM | SYSTOLIC BLOOD PRESSURE: 126 MMHG | BODY MASS INDEX: 25.4 KG/M2 | WEIGHT: 138 LBS | TEMPERATURE: 97.1 F | OXYGEN SATURATION: 99 % | DIASTOLIC BLOOD PRESSURE: 78 MMHG | HEIGHT: 62 IN

## 2023-08-07 DIAGNOSIS — S65.515A LACERATION OF BLOOD VESSEL OF LEFT RING FINGER, INITIAL ENCOUNTER: Primary | ICD-10-CM

## 2023-08-07 PROCEDURE — 99213 OFFICE O/P EST LOW 20 MIN: CPT | Performed by: NURSE PRACTITIONER

## 2023-08-07 NOTE — PROGRESS NOTES
Subjective   Tierney Rolon is a 67 y.o. female presenting today for follow up of   Chief Complaint   Patient presents with    Hospital Follow Up Visit     8/1 ED for hand laceration. Cut finger on pinch collar for dog, has 10 stitches.        History of Present Illness     Outpatient Medications Marked as Taking for the 8/7/23 encounter (Office Visit) with Cassie Callahan APRN   Medication Sig Dispense Refill    atorvastatin (LIPITOR) 20 MG tablet TAKE 1 TABLET EVERY NIGHT 90 tablet 3    hydroCHLOROthiazide (HYDRODIURIL) 25 MG tablet TAKE 1 TABLET DAILY 90 tablet 3    MAGNESIUM GLYCINATE PO Take  by mouth.      Misc Natural Products (OSTEO BI-FLEX ADV TRIPLE ST) tablet Take  by mouth.      Multiple Vitamins-Minerals (OCUVITE ADULT FORMULA PO) Take  by mouth.      neomycin-bacitracin-polymyxin (NEOSPORIN) 5-400-5000 ointment Apply 1 application  topically to the appropriate area as directed 3 (Three) Times a Day for 7 days. 21 g 0    Omega-3 Fatty Acids (FISH OIL) 1000 MG capsule capsule Take 2 capsules by mouth Daily.      PARoxetine (PAXIL) 10 MG tablet TAKE 1 TABLET EVERY MORNING 90 tablet 3    saccharomyces boulardii (Florastor) 250 MG capsule Take 1 capsule by mouth 2 (Two) Times a Day. 60 capsule 0    Synthroid 50 MCG tablet TAKE 1 TABLET DAILY 90 tablet 3    Ubiquinol 100 MG capsule          ED with Alfred Rivera MD (08/01/2023)   Narrative: Pt is a 67 y.o. female who presents complaining of a laceration to her left ring finger x1 hour prior to arrival.  The patient advises that she was bringing her dog in from playing in the past year.  The dog wears a metal pinch collar.  She had bent down to put his regular collar on him when he suddenly moved and one of the points from the pinch collar was inserted into her left ring finger and the dog pulled away, causing a laceration.  The patient denies any other injuries.  She does have a mild stinging pain in her left ring finger.   ED Course as of  08/01/23 1759   Tue Aug 01, 2023   1620 The patient appears well.  She does have a significant laceration noted to her left ring finger.  Her vital signs are stable and within normal limits.  We will order an x-ray to evaluate for any bony abnormalities or foreign body.  Wound closure options discussed with the patient.  She is agreeable to have sutures. [AH]   1757 Laceration repair:  Discussed risks to include bleeding, infection, further tissue damage, benefits to include improved wound healing, and alternatives to include no closure, altered closure techniques with patient/parents of the patient/guardian.  Expressed verbal consent for procedure.  5 cm laceration located on the left ring finger.  Wound is anesthetized with lidocaine 2% and Marcaine, cleansed with chlorhexidine, and irrigated copiously.  Wound explored.  Simple single layer laceration closed with 4-0 Ethilon in an interrupted fashion requiring 10 sutures.  Well-tolerated.  No immediate complications identified.  Reviewed wound care, follow-up for suture removal, and red flags which would indicate need for reevaluation of the wound.    [AH]   1758 Results discussed with the patient.  Follow-up instructions given.  Return to the ED instructions given. [AH]   1755 I had Dr. Rivera evaluate this patient's laceration as well. He believes suture closure will suffice.  [AH]     Pt presents on day 6 following her injury for re-eval of wound.      The following portions of the patient's history were reviewed and updated as appropriate: allergies, current medications, past family history, past medical history, past social history, past surgical history and problem list.    Review of Systems   Constitutional:  Negative for chills and fever.   Skin:  Positive for wound.     Objective   Vitals:    08/07/23 1030   BP: 126/78   BP Location: Right arm   Patient Position: Sitting   Cuff Size: Adult   Pulse: 79   Temp: 97.1 øF (36.2 øC)   TempSrc: Infrared   SpO2:  "99%   Weight: 62.6 kg (138 lb)   Height: 157.5 cm (62\")       BP Readings from Last 3 Encounters:   08/07/23 126/78   08/01/23 139/74   07/17/23 114/72        Wt Readings from Last 3 Encounters:   08/07/23 62.6 kg (138 lb)   08/01/23 64.3 kg (141 lb 12.8 oz)   07/17/23 63.3 kg (139 lb 9.6 oz)        Body mass index is 25.24 kg/mý.  Nursing notes and vitals reviewed.    Physical Exam  Constitutional:       General: She is not in acute distress.     Appearance: She is well-developed.   Pulmonary:      Effort: Pulmonary effort is normal.   Skin:     Findings: Laceration (L hand 4th digit; \"J\" shaped. Intact sutures. Mild edema. No purulent d/c.) present.   Neurological:      Mental Status: She is alert.   Psychiatric:         Attention and Perception: She is attentive.         Speech: Speech normal.       XR Finger 2+ View Left    Result Date: 8/1/2023  Laceration injury without associated fracture or dislocation. Equivocal tiny less than 1 mm punctate retained opaque foreign body adjacent to the middle phalanx on the oblique projection. Correlate with physical exam..  This report was finalized on 8/1/2023 4:51 PM by Dr. Malcom Sharpe MD.              Assessment & Plan   Diagnoses and all orders for this visit:    1. Laceration of blood vessel of left ring finger, initial encounter (Primary)        Will plan to leave sutures in place until day 10 d/t edema and location of wound.  Cont drsg changes and bacitracin.      Medications, including side effects, were discussed with the patient. Patient verbalized understanding.  The plan of care was discussed. All questions were answered. Patient verbalized understanding.      Return in about 4 days (around 8/11/2023).              "

## 2023-08-09 NOTE — PROGRESS NOTES
"Subjective   Patient ID: Tierney Rolon is a 67 y.o. female is here today for follow-up with a new EMG/NCS done on 08/03/2023.    Today     History of Present Illness    When this patient was last here in April she was having pain in her back with radiation to her left leg.  She felt she had some weakness in the everters on the left side.  The right leg was okay.  We sent her for an EMG.    The following portions of the patient's history were reviewed and updated as appropriate: allergies, current medications, past family history, past medical history, past social history, past surgical history, and problem list.    Review of Systems   Musculoskeletal:  Negative for back pain.   Neurological:  Negative for weakness and numbness.     I reviewed the review of systems listed by the patient and discussed by my MA    Objective     Vitals:    08/10/23 1242   BP: 124/62   BP Location: Left arm   Patient Position: Sitting   Cuff Size: Adult   Weight: 62.6 kg (138 lb)   Height: 157.5 cm (62.01\")   PainSc: 0-No pain     Body mass index is 25.23 kg/mý.    Tobacco Use: Low Risk     Smoking Tobacco Use: Never    Smokeless Tobacco Use: Never    Passive Exposure: Never          Physical Exam  Neurological:      Mental Status: She is alert and oriented to person, place, and time.     Neurologic Exam     Mental Status   Oriented to person, place, and time.         Assessment & Plan   Independent Review of Radiographic Studies:      I personally reviewed the images from the following studies.    I reviewed her EMG done on 3 August.  This study is normal.    I also reviewed her myelogram.  There was a nerve root cut out on the left side at L4-5.  With some foraminal stenosis but nothing severe.    Medical Decision Making:      I told the patient about the imaging and the EMG.  Since there is no peripheral entrapment we could certainly consider surgery on her back but my recommendation was to live with it.  She thinks that is the best " way to go as well and will call if anything comes up in the future.    Diagnoses and all orders for this visit:    1. Bulging of intervertebral disc between L4 and L5 (Primary)      Return if symptoms worsen or fail to improve.

## 2023-08-10 ENCOUNTER — OFFICE VISIT (OUTPATIENT)
Dept: NEUROSURGERY | Facility: CLINIC | Age: 68
End: 2023-08-10
Payer: COMMERCIAL

## 2023-08-10 VITALS
WEIGHT: 138 LBS | DIASTOLIC BLOOD PRESSURE: 62 MMHG | BODY MASS INDEX: 25.4 KG/M2 | HEIGHT: 62 IN | SYSTOLIC BLOOD PRESSURE: 124 MMHG

## 2023-08-10 DIAGNOSIS — M51.36 BULGING OF INTERVERTEBRAL DISC BETWEEN L4 AND L5: Primary | ICD-10-CM

## 2023-08-10 PROCEDURE — 99213 OFFICE O/P EST LOW 20 MIN: CPT | Performed by: NEUROLOGICAL SURGERY

## 2023-08-11 ENCOUNTER — OFFICE VISIT (OUTPATIENT)
Dept: INTERNAL MEDICINE | Facility: CLINIC | Age: 68
End: 2023-08-11
Payer: COMMERCIAL

## 2023-08-11 VITALS
TEMPERATURE: 98 F | HEART RATE: 85 BPM | OXYGEN SATURATION: 98 % | SYSTOLIC BLOOD PRESSURE: 142 MMHG | BODY MASS INDEX: 25.51 KG/M2 | HEIGHT: 62 IN | DIASTOLIC BLOOD PRESSURE: 80 MMHG | WEIGHT: 138.6 LBS

## 2023-08-11 DIAGNOSIS — S61.219D LACERATION OF SKIN OF FINGER, SUBSEQUENT ENCOUNTER: Primary | ICD-10-CM

## 2023-08-11 NOTE — PROGRESS NOTES
"Tierney Rolon is a 67 y.o. female presenting today for   Chief Complaint   Patient presents with    Suture / Staple Removal       Subjective    Suture / Staple Removal       Presents at day #10 from injury to remove stitches.    The following portions of the patient's history were reviewed and updated as appropriate: allergies, current medications, problem list, past medical history, past surgical history, family history, and social history.    Review of Systems   Constitutional:  Negative for chills and fever.   Skin:  Positive for wound. Negative for color change.       Objective    Vitals:    08/11/23 1054   BP: 142/80   BP Location: Left arm   Patient Position: Sitting   Cuff Size: Adult   Pulse: 85   Temp: 98 øF (36.7 øC)   TempSrc: Infrared   SpO2: 98%   Weight: 62.9 kg (138 lb 9.6 oz)   Height: 157.5 cm (62.01\")     Body mass index is 25.34 kg/mý.  Nursing notes and vitals reviewed.    Physical Exam  Constitutional:       General: She is not in acute distress.     Appearance: She is well-developed.   Pulmonary:      Effort: Pulmonary effort is normal.   Skin:     Comments: Sutures are intact. No signs of infection or necrosis.   Neurological:      Mental Status: She is alert.   Psychiatric:         Attention and Perception: She is attentive.         Speech: Speech normal.         Assessment and Plan    Diagnoses and all orders for this visit:    1. Laceration of skin of finger, subsequent encounter (Primary)    Other orders  -     Suture Removal        Suture Removal    Date/Time: 8/11/2023 11:57 AM  Performed by: Cassie Callahan APRN  Authorized by: Cassie Callahan APRN   Consent: Verbal consent obtained.  Risks and benefits: risks, benefits and alternatives were discussed  Consent given by: patient  Patient understanding: patient states understanding of the procedure being performed  Body area: upper extremity  Location details: left index finger  Wound Appearance: clean  Sutures Removed: " 10  Post-removal: dressing applied, Steri-Strips applied and antibiotic ointment applied  Patient tolerance: patient tolerated the procedure well with no immediate complications  Comments: Finger splint applied.      Drsg change BID.   Apply butterfly bandage when steri strips fall off.  Can leave open air for a period of time daily if resting.      Medications, including side effects, were discussed with the patient. Patient verbalized understanding.  The plan of care was discussed. All questions were answered. Patient verbalized understanding.        Return in about 2 weeks (around 8/25/2023).

## 2023-08-29 ENCOUNTER — OFFICE VISIT (OUTPATIENT)
Dept: INTERNAL MEDICINE | Facility: CLINIC | Age: 68
End: 2023-08-29
Payer: COMMERCIAL

## 2023-08-29 VITALS
SYSTOLIC BLOOD PRESSURE: 122 MMHG | HEART RATE: 96 BPM | BODY MASS INDEX: 25.51 KG/M2 | TEMPERATURE: 97.4 F | WEIGHT: 138.6 LBS | HEIGHT: 62 IN | DIASTOLIC BLOOD PRESSURE: 64 MMHG | OXYGEN SATURATION: 95 %

## 2023-08-29 DIAGNOSIS — S61.219D LACERATION OF SKIN OF FINGER, SUBSEQUENT ENCOUNTER: Primary | ICD-10-CM

## 2023-08-29 PROCEDURE — 99213 OFFICE O/P EST LOW 20 MIN: CPT | Performed by: NURSE PRACTITIONER

## 2023-08-29 NOTE — PROGRESS NOTES
"Subjective   Tierney Rolon is a 67 y.o. female presenting today for follow up of   Chief Complaint   Patient presents with    Wound Check     Finger wound, having trouble bending finger       Wound Check       Outpatient Medications Marked as Taking for the 8/29/23 encounter (Office Visit) with Cassie Callahan APRN   Medication Sig Dispense Refill    aspirin 81 MG tablet Take  by mouth Daily.      atorvastatin (LIPITOR) 20 MG tablet TAKE 1 TABLET EVERY NIGHT 90 tablet 3    hydroCHLOROthiazide (HYDRODIURIL) 25 MG tablet TAKE 1 TABLET DAILY 90 tablet 3    MAGNESIUM GLYCINATE PO Take  by mouth.      Misc Natural Products (OSTEO BI-FLEX ADV TRIPLE ST) tablet Take  by mouth.      Multiple Vitamins-Minerals (OCUVITE ADULT FORMULA PO) Take  by mouth.      Omega-3 Fatty Acids (FISH OIL) 1000 MG capsule capsule Take 2 capsules by mouth Daily.      PARoxetine (PAXIL) 10 MG tablet TAKE 1 TABLET EVERY MORNING 90 tablet 3    saccharomyces boulardii (Florastor) 250 MG capsule Take 1 capsule by mouth 2 (Two) Times a Day. 60 capsule 0    Synthroid 50 MCG tablet TAKE 1 TABLET DAILY 90 tablet 3    Ubiquinol 100 MG capsule          3.5 wks post laceration to the L 4th digit. Healing is progressive but she concerned about limited ROM.      The following portions of the patient's history were reviewed and updated as appropriate: allergies, current medications, past family history, past medical history, past social history, past surgical history and problem list.        Objective   Vitals:    08/29/23 1357   BP: 122/64   BP Location: Left arm   Patient Position: Sitting   Cuff Size: Adult   Pulse: 96   Temp: 97.4 øF (36.3 øC)   TempSrc: Infrared   SpO2: 95%   Weight: 62.9 kg (138 lb 9.6 oz)   Height: 157.5 cm (62.01\")       BP Readings from Last 3 Encounters:   08/29/23 122/64   08/11/23 142/80   08/10/23 124/62        Wt Readings from Last 3 Encounters:   08/29/23 62.9 kg (138 lb 9.6 oz)   08/11/23 62.9 kg (138 lb 9.6 oz) "   08/10/23 62.6 kg (138 lb)        Body mass index is 25.34 kg/mý.  Nursing notes and vitals reviewed.    Physical Exam  Constitutional:       General: She is not in acute distress.     Appearance: She is well-developed.   Cardiovascular:      Comments: Cap refill <3sec  Pulmonary:      Effort: Pulmonary effort is normal.   Musculoskeletal:        Arms:    Skin:         Neurological:      Mental Status: She is alert.   Psychiatric:         Attention and Perception: She is attentive.         Speech: Speech normal.       No results found for this or any previous visit (from the past 672 hour(s)).      Assessment & Plan   Diagnoses and all orders for this visit:    1. Laceration of skin of finger, subsequent encounter (Primary)        Anticipatory guidance. Discussed supportive care and emergent S&S.  Advised her to go ahead and schedule an appt w/ [facility]. She can cancel this if she does not need it.  May consider OT when healing is complete if needed.      Medications, including side effects, were discussed with the patient. Patient verbalized understanding.  The plan of care was discussed. All questions were answered. Patient verbalized understanding.      Return if symptoms worsen or fail to improve.

## 2023-10-30 ENCOUNTER — OFFICE VISIT (OUTPATIENT)
Dept: INTERNAL MEDICINE | Facility: CLINIC | Age: 68
End: 2023-10-30
Payer: COMMERCIAL

## 2023-10-30 VITALS
BODY MASS INDEX: 24.4 KG/M2 | HEIGHT: 62 IN | OXYGEN SATURATION: 97 % | HEART RATE: 90 BPM | WEIGHT: 132.6 LBS | DIASTOLIC BLOOD PRESSURE: 78 MMHG | TEMPERATURE: 96.8 F | SYSTOLIC BLOOD PRESSURE: 128 MMHG

## 2023-10-30 DIAGNOSIS — Z00.00 ENCOUNTER FOR MEDICARE ANNUAL WELLNESS EXAM: Primary | ICD-10-CM

## 2023-10-30 DIAGNOSIS — Z12.31 ENCOUNTER FOR SCREENING MAMMOGRAM FOR MALIGNANT NEOPLASM OF BREAST: ICD-10-CM

## 2023-10-30 DIAGNOSIS — Z12.11 SCREENING FOR MALIGNANT NEOPLASM OF COLON: ICD-10-CM

## 2023-10-30 DIAGNOSIS — Z78.0 POSTMENOPAUSE: ICD-10-CM

## 2023-10-30 PROCEDURE — G0402 INITIAL PREVENTIVE EXAM: HCPCS | Performed by: NURSE PRACTITIONER

## 2023-10-30 PROCEDURE — G0403 EKG FOR INITIAL PREVENT EXAM: HCPCS | Performed by: NURSE PRACTITIONER

## 2023-10-30 NOTE — PROGRESS NOTES
The ABCs of the Annual Wellness Visit  Subsequent Medicare Wellness Visit    Chief Complaint   Patient presents with    Medicare Wellness-subsequent       Subjective   History of Present Illness:  Tierney Rolon is a 67 y.o. female who presents for a Medicare Wellness Visit.        Patient Active Problem List   Diagnosis    Primary hypertension    Mixed hyperlipidemia    Left bundle branch block (LBBB)    Acquired hypothyroidism    Bulging of intervertebral disc between L4 and L5    Vasomotor symptoms due to menopause    Other chronic pain    Chronic bilateral low back pain with left-sided sciatica    Decreased libido       Outpatient Medications Marked as Taking for the 10/30/23 encounter (Office Visit) with Cassie Callahan APRN   Medication Sig Dispense Refill    aspirin 81 MG tablet Take  by mouth Daily.      atorvastatin (LIPITOR) 20 MG tablet TAKE 1 TABLET EVERY NIGHT 90 tablet 3    hydroCHLOROthiazide (HYDRODIURIL) 25 MG tablet TAKE 1 TABLET DAILY 90 tablet 3    MAGNESIUM GLYCINATE PO Take  by mouth.      Misc Natural Products (OSTEO BI-FLEX ADV TRIPLE ST) tablet Take  by mouth.      Multiple Vitamins-Minerals (OCUVITE ADULT FORMULA PO) Take  by mouth.      Omega-3 Fatty Acids (FISH OIL) 1000 MG capsule capsule Take 2 capsules by mouth Daily.      PARoxetine (PAXIL) 10 MG tablet TAKE 1 TABLET EVERY MORNING 90 tablet 3    saccharomyces boulardii (Florastor) 250 MG capsule Take 1 capsule by mouth 2 (Two) Times a Day. 60 capsule 0    Synthroid 50 MCG tablet TAKE 1 TABLET DAILY 90 tablet 3    Ubiquinol 100 MG capsule            HEALTH RISK ASSESSMENT    Recent Hospitalizations:  No hospitalization(s) within the last year.    Current Medical Providers:  Patient Care Team:  Cassie Callahan APRN as PCP - General (Family Medicine)    Smoking Status:  Social History     Tobacco Use   Smoking Status Never    Passive exposure: Never   Smokeless Tobacco Never       Alcohol Consumption:  Social History      Substance and Sexual Activity   Alcohol Use Yes    Alcohol/week: 5.0 - 7.0 standard drinks of alcohol    Types: 5 - 7 Glasses of wine per week       Depression Screen:       10/30/2023     1:33 PM   PHQ-2/PHQ-9 Depression Screening   Little Interest or Pleasure in Doing Things 2-->more than half the days   Feeling Down, Depressed or Hopeless 2-->more than half the days   Trouble Falling or Staying Asleep, or Sleeping Too Much 0-->not at all   Feeling Tired or Having Little Energy 0-->not at all   Poor Appetite or Overeating 2-->more than half the days   Feeling Bad about Yourself - or that You are a Failure or Have Let Yourself or Your Family Down 0-->not at all   Trouble Concentrating on Things, Such as Reading the Newspaper or Watching Television 0-->not at all   Moving or Speaking So Slowly that Other People Could Have Noticed? Or the Opposite - Being So Fidgety 0-->not at all   Thoughts that You Would be Better Off Dead or of Hurting Yourself in Some Way 0-->not at all   PHQ-9: Brief Depression Severity Measure Score 6   If You Checked Off Any Problems, How Difficult Have These Problems Made It For You to Do Your Work, Take Care of Things at Home, or Get Along with Other People? not difficult at all       Fall Risk Screen:  KHRIS Fall Risk Assessment was completed, and patient is at HIGH risk for falls. Assessment completed on:10/30/2023    Health Habits and Functional and Cognitive Screening:      10/30/2023     1:32 PM   Functional & Cognitive Status   Do you have difficulty preparing food and eating? No   Do you have difficulty bathing yourself, getting dressed or grooming yourself? No   Do you have difficulty using the toilet? No   Do you have difficulty moving around from place to place? No   Do you have trouble with steps or getting out of a bed or a chair? No   Current Diet Well Balanced Diet   Dental Exam Up to date   Eye Exam Up to date   Exercise (times per week) 0 times per week   Current  Exercises Include No Regular Exercise   Do you need help using the phone?  No   Are you deaf or do you have serious difficulty hearing?  No   Do you need help to go to places out of walking distance? No   Do you need help shopping? No   Do you need help preparing meals?  No   Do you need help with housework?  No   Do you need help with laundry? No   Do you need help taking your medications? No   Do you need help managing money? No   Do you ever drive or ride in a car without wearing a seat belt? No   Have you felt unusual stress, anger or loneliness in the last month? Yes   Who do you live with? Spouse   If you need help, do you have trouble finding someone available to you? No   Do you have difficulty concentrating, remembering or making decisions? No         Does the patient have evidence of cognitive impairment? No    Asprin use counseling:Taking ASA appropriately as indicated    Age-appropriate Screening Schedule:  Refer to the list below for future screening recommendations based on patient's age, sex and/or medical conditions. Orders for these recommended tests are listed in the plan section. The patient has been provided with a written plan.    Health Maintenance   Topic Date Due    DXA SCAN  03/25/2024    MAMMOGRAM  03/27/2024    COLORECTAL CANCER SCREENING  06/14/2024    LIPID PANEL  06/26/2024    ANNUAL WELLNESS VISIT  10/30/2024    TDAP/TD VACCINES (3 - Td or Tdap) 08/01/2033    HEPATITIS C SCREENING  Completed    INFLUENZA VACCINE  Completed    Pneumococcal Vaccine 65+  Completed    ZOSTER VACCINE  Completed    COVID-19 Vaccine  Discontinued    PAP SMEAR  Discontinued          The following portions of the patient's history were reviewed and updated as appropriate: allergies, current medications, past family history, past medical history, past social history, past surgical history, and problem list.      Compared to one year ago, the patient feels her physical health is better.  Compared to one year ago,  "the patient feels her mental health is worse.    Reviewed chart for potential of high risk medication in the elderly: Yes  Reviewed chart for potential of harmful drug interactions in the elderly:Yes            Objective         Vitals:    10/30/23 1330   BP: 128/78   BP Location: Left arm   Patient Position: Sitting   Cuff Size: Adult   Pulse: 90   Temp: 96.8 °F (36 °C)   TempSrc: Infrared   SpO2: 97%   Weight: 60.1 kg (132 lb 9.6 oz)   Height: 157.5 cm (62.01\")       Vision Screening    Right eye Left eye Both eyes   Without correction 20/25 20/200 20/25   With correction      Comments: L eye pt says is her reading eye, uses reading glasses     Body mass index is 24.25 kg/m².        Physical Exam  Constitutional:       General: She is not in acute distress.     Appearance: Normal appearance. She is well-developed.   HENT:      Head: Normocephalic.      Right Ear: Hearing, tympanic membrane, ear canal and external ear normal.      Left Ear: Hearing, tympanic membrane, ear canal and external ear normal.      Nose: Nose normal. No mucosal edema or rhinorrhea.      Mouth/Throat:      Mouth: Mucous membranes are moist.      Pharynx: Oropharynx is clear. Uvula midline.   Eyes:      General: Lids are normal.      Extraocular Movements: Extraocular movements intact.      Conjunctiva/sclera: Conjunctivae normal.      Pupils: Pupils are equal, round, and reactive to light.   Neck:      Thyroid: No thyroid mass or thyromegaly.   Cardiovascular:      Rate and Rhythm: Regular rhythm.      Pulses: Normal pulses.      Heart sounds: S1 normal and S2 normal. No murmur heard.     No friction rub. No gallop.   Pulmonary:      Effort: Pulmonary effort is normal.      Breath sounds: Normal breath sounds. No wheezing, rhonchi or rales.   Abdominal:      General: Bowel sounds are normal.      Palpations: Abdomen is soft.      Tenderness: There is no abdominal tenderness. There is no guarding.      Hernia: No hernia is present. "   Musculoskeletal:         General: No deformity. Normal range of motion.      Cervical back: Normal range of motion and neck supple.   Lymphadenopathy:      Cervical: No cervical adenopathy.   Skin:     General: Skin is warm and dry.      Findings: No lesion or rash.   Neurological:      General: No focal deficit present.      Mental Status: She is alert and oriented to person, place, and time.      Cranial Nerves: No cranial nerve deficit.      Sensory: No sensory deficit.      Motor: Motor function is intact.      Coordination: Coordination is intact.      Gait: Gait normal.      Deep Tendon Reflexes: Reflexes are normal and symmetric.   Psychiatric:         Attention and Perception: She is attentive.         Mood and Affect: Mood and affect normal.         Speech: Speech normal.         Behavior: Behavior normal.         Thought Content: Thought content normal.           ECG 12 Lead    Date/Time: 10/30/2023 2:32 PM  Performed by: Cassie Callahan APRN    Authorized by: Cassie Callahan APRN  Comparison: compared with previous ECG   Similar to previous ECG  Comparison to previous ECG: LBBB            Each of these lab results were discussed individually in detail with the patient.      Assessment & Plan     Diagnoses and all orders for this visit:    1. Encounter for Medicare annual wellness exam (Primary)    2. Encounter for screening mammogram for malignant neoplasm of breast  -     Mammo Screening Digital Tomosynthesis Bilateral With CAD; Future    3. Postmenopause  -     DEXA Bone Density Axial; Future    4. Screening for malignant neoplasm of colon  -     Ambulatory Referral to Gastroenterology    Other orders  -     ECG 12 Lead        Medicare Risks and Personalized Health Plan    Advanced Care Planning:  ACP discussion was held with the patient during this visit. Patient does not have an advance directive, information provided.    CMS Preventative Services Quick Reference  Advance Directive  Discussion  Breast Cancer/Mammogram Screening  Colon Cancer Screening  Immunizations Discussed/Encouraged (specific immunizations; COVID19 )  Osteoporosis Risk      Discussed the patient's BMI with her. The BMI is in the acceptable range.  BMI is within normal parameters. No other follow-up for BMI required.        The above risks/problems have been discussed with the patient.  Pertinent information has been shared with the patient in the After Visit Summary.  Follow up plans and orders are seen below in the Assessment/Plan Section.      Follow Up:  Return for As Previously Scheduled.    An After Visit Summary and PPPS were given to the patient.

## 2023-11-02 DIAGNOSIS — E03.9 ACQUIRED HYPOTHYROIDISM: ICD-10-CM

## 2023-11-02 DIAGNOSIS — E78.2 MIXED HYPERLIPIDEMIA: ICD-10-CM

## 2023-11-02 RX ORDER — ATORVASTATIN CALCIUM 20 MG/1
TABLET, FILM COATED ORAL
Qty: 90 TABLET | Refills: 3 | Status: SHIPPED | OUTPATIENT
Start: 2023-11-02

## 2023-11-02 RX ORDER — LEVOTHYROXINE SODIUM 0.05 MG/1
50 TABLET ORAL DAILY
Qty: 90 TABLET | Refills: 3 | Status: SHIPPED | OUTPATIENT
Start: 2023-11-02

## 2023-11-02 NOTE — TELEPHONE ENCOUNTER
Rx Refill Note  Requested Prescriptions     Pending Prescriptions Disp Refills    atorvastatin (LIPITOR) 20 MG tablet [Pharmacy Med Name: ATORVASTATIN TABS 20MG] 90 tablet 3     Sig: TAKE 1 TABLET EVERY NIGHT    levothyroxine (SYNTHROID, LEVOTHROID) 50 MCG tablet [Pharmacy Med Name: L-THYROXINE TABS 50MCG] 90 tablet 3     Sig: TAKE 1 TABLET DAILY      Last office visit with prescribing clinician: 10/30/2023   Last telemedicine visit with prescribing clinician: Visit date not found   Next office visit with prescribing clinician: 1/18/2024                         Would you like a call back once the refill request has been completed: [] Yes [] No    If the office needs to give you a call back, can they leave a voicemail: [] Yes [] No    Eleazar Watt, PCT  11/02/23, 09:11 EDT

## 2023-11-10 ENCOUNTER — TELEPHONE (OUTPATIENT)
Dept: GASTROENTEROLOGY | Facility: CLINIC | Age: 68
End: 2023-11-10
Payer: COMMERCIAL

## 2023-11-10 NOTE — TELEPHONE ENCOUNTER
FAST TRACK REFERRAL   LAST COLONOSCOPY 12-4-2014 BY DR. ALVARADO   NO PERSONAL HISTORY OF POLYPS  SCHEDULE AT [facility]

## 2023-11-12 DIAGNOSIS — Z12.11 ENCOUNTER FOR SCREENING FOR MALIGNANT NEOPLASM OF COLON: Primary | ICD-10-CM

## 2023-11-13 PROBLEM — Z12.11 ENCOUNTER FOR SCREENING FOR MALIGNANT NEOPLASM OF COLON: Status: ACTIVE | Noted: 2023-11-12

## 2024-01-03 DIAGNOSIS — E03.9 ACQUIRED HYPOTHYROIDISM: ICD-10-CM

## 2024-01-03 DIAGNOSIS — E78.2 MIXED HYPERLIPIDEMIA: ICD-10-CM

## 2024-01-03 NOTE — TELEPHONE ENCOUNTER
Due to availability, pt needs 1 month sent to local [facility].     Rx Refill Note  Requested Prescriptions     Pending Prescriptions Disp Refills    levothyroxine (SYNTHROID, LEVOTHROID) 50 MCG tablet 30 tablet 0     Sig: Take 1 tablet by mouth Daily.      Last office visit with prescribing clinician: 10/30/2023   Last telemedicine visit with prescribing clinician: Visit date not found   Next office visit with prescribing clinician: 1/18/2024                         Would you like a call back once the refill request has been completed: [] Yes [] No    If the office needs to give you a call back, can they leave a voicemail: [] Yes [] No    Eleazar Watt, PCT  01/03/24, 08:32 EST

## 2024-01-04 RX ORDER — LEVOTHYROXINE SODIUM 0.05 MG/1
50 TABLET ORAL DAILY
Qty: 30 TABLET | Refills: 0 | Status: SHIPPED | OUTPATIENT
Start: 2024-01-04

## 2024-01-10 LAB
ALBUMIN SERPL-MCNC: 4.8 G/DL (ref 3.5–5.2)
ALBUMIN/GLOB SERPL: 2.2 G/DL
ALP SERPL-CCNC: 91 U/L (ref 39–117)
ALT SERPL-CCNC: 31 U/L (ref 1–33)
AST SERPL-CCNC: 27 U/L (ref 1–32)
BILIRUB SERPL-MCNC: 0.6 MG/DL (ref 0–1.2)
BUN SERPL-MCNC: 15 MG/DL (ref 8–23)
BUN/CREAT SERPL: 17.6 (ref 7–25)
CALCIUM SERPL-MCNC: 10.3 MG/DL (ref 8.6–10.5)
CHLORIDE SERPL-SCNC: 99 MMOL/L (ref 98–107)
CHOLEST SERPL-MCNC: 201 MG/DL (ref 0–200)
CHOLEST/HDLC SERPL: 2.72 {RATIO}
CO2 SERPL-SCNC: 28.6 MMOL/L (ref 22–29)
CREAT SERPL-MCNC: 0.85 MG/DL (ref 0.57–1)
EGFRCR SERPLBLD CKD-EPI 2021: 75.7 ML/MIN/1.73
GLOBULIN SER CALC-MCNC: 2.2 GM/DL
GLUCOSE SERPL-MCNC: 91 MG/DL (ref 65–99)
HDLC SERPL-MCNC: 74 MG/DL (ref 40–60)
LDLC SERPL CALC-MCNC: 107 MG/DL (ref 0–100)
POTASSIUM SERPL-SCNC: 3.8 MMOL/L (ref 3.5–5.2)
PROT SERPL-MCNC: 7 G/DL (ref 6–8.5)
SODIUM SERPL-SCNC: 140 MMOL/L (ref 136–145)
T4 FREE SERPL-MCNC: 1.34 NG/DL (ref 0.93–1.7)
TRIGL SERPL-MCNC: 114 MG/DL (ref 0–150)
TSH SERPL DL<=0.005 MIU/L-ACNC: 2.22 UIU/ML (ref 0.27–4.2)
VLDLC SERPL CALC-MCNC: 20 MG/DL (ref 5–40)

## 2024-01-16 ENCOUNTER — ANESTHESIA EVENT (OUTPATIENT)
Dept: PERIOP | Facility: HOSPITAL | Age: 69
End: 2024-01-16
Payer: MEDICARE

## 2024-01-17 ENCOUNTER — ANESTHESIA (OUTPATIENT)
Dept: PERIOP | Facility: HOSPITAL | Age: 69
End: 2024-01-17
Payer: MEDICARE

## 2024-01-17 ENCOUNTER — HOSPITAL ENCOUNTER (OUTPATIENT)
Facility: HOSPITAL | Age: 69
Setting detail: HOSPITAL OUTPATIENT SURGERY
Discharge: HOME | End: 2024-01-17
Attending: STUDENT IN AN ORGANIZED HEALTH CARE EDUCATION/TRAINING PROGRAM | Admitting: STUDENT IN AN ORGANIZED HEALTH CARE EDUCATION/TRAINING PROGRAM
Payer: MEDICARE

## 2024-01-17 VITALS
TEMPERATURE: 98.2 F | OXYGEN SATURATION: 99 % | HEART RATE: 81 BPM | RESPIRATION RATE: 18 BRPM | WEIGHT: 128 LBS | DIASTOLIC BLOOD PRESSURE: 68 MMHG | HEIGHT: 62 IN | BODY MASS INDEX: 23.55 KG/M2 | SYSTOLIC BLOOD PRESSURE: 124 MMHG

## 2024-01-17 DIAGNOSIS — Z12.11 ENCOUNTER FOR SCREENING FOR MALIGNANT NEOPLASM OF COLON: ICD-10-CM

## 2024-01-17 PROCEDURE — G0121 COLON CA SCRN NOT HI RSK IND: HCPCS | Performed by: STUDENT IN AN ORGANIZED HEALTH CARE EDUCATION/TRAINING PROGRAM

## 2024-01-17 PROCEDURE — 25010000002 PROPOFOL 200 MG/20ML EMULSION: Performed by: NURSE ANESTHETIST, CERTIFIED REGISTERED

## 2024-01-17 PROCEDURE — 25810000003 LACTATED RINGERS PER 1000 ML: Performed by: NURSE ANESTHETIST, CERTIFIED REGISTERED

## 2024-01-17 RX ORDER — SODIUM CHLORIDE 9 MG/ML
40 INJECTION, SOLUTION INTRAVENOUS AS NEEDED
Status: DISCONTINUED | OUTPATIENT
Start: 2024-01-17 | End: 2024-01-17 | Stop reason: HOSPADM

## 2024-01-17 RX ORDER — MAGNESIUM HYDROXIDE 1200 MG/15ML
LIQUID ORAL AS NEEDED
Status: DISCONTINUED | OUTPATIENT
Start: 2024-01-17 | End: 2024-01-17 | Stop reason: HOSPADM

## 2024-01-17 RX ORDER — SODIUM CHLORIDE 0.9 % (FLUSH) 0.9 %
10 SYRINGE (ML) INJECTION EVERY 12 HOURS SCHEDULED
Status: DISCONTINUED | OUTPATIENT
Start: 2024-01-17 | End: 2024-01-17 | Stop reason: HOSPADM

## 2024-01-17 RX ORDER — SODIUM CHLORIDE, SODIUM LACTATE, POTASSIUM CHLORIDE, CALCIUM CHLORIDE 600; 310; 30; 20 MG/100ML; MG/100ML; MG/100ML; MG/100ML
9 INJECTION, SOLUTION INTRAVENOUS CONTINUOUS PRN
Status: DISCONTINUED | OUTPATIENT
Start: 2024-01-17 | End: 2024-01-17 | Stop reason: HOSPADM

## 2024-01-17 RX ORDER — SODIUM CHLORIDE 0.9 % (FLUSH) 0.9 %
10 SYRINGE (ML) INJECTION AS NEEDED
Status: DISCONTINUED | OUTPATIENT
Start: 2024-01-17 | End: 2024-01-17 | Stop reason: HOSPADM

## 2024-01-17 RX ORDER — LIDOCAINE HYDROCHLORIDE 20 MG/ML
INJECTION, SOLUTION INFILTRATION; PERINEURAL AS NEEDED
Status: DISCONTINUED | OUTPATIENT
Start: 2024-01-17 | End: 2024-01-17 | Stop reason: SURG

## 2024-01-17 RX ORDER — PROPOFOL 10 MG/ML
INJECTION, EMULSION INTRAVENOUS AS NEEDED
Status: DISCONTINUED | OUTPATIENT
Start: 2024-01-17 | End: 2024-01-17 | Stop reason: SURG

## 2024-01-17 RX ORDER — ONDANSETRON 2 MG/ML
4 INJECTION INTRAMUSCULAR; INTRAVENOUS ONCE AS NEEDED
Status: DISCONTINUED | OUTPATIENT
Start: 2024-01-17 | End: 2024-01-17 | Stop reason: HOSPADM

## 2024-01-17 RX ADMIN — PROPOFOL INJECTABLE EMULSION 350 MG: 10 INJECTION, EMULSION INTRAVENOUS at 09:08

## 2024-01-17 RX ADMIN — LIDOCAINE HYDROCHLORIDE 100 MG: 20 INJECTION, SOLUTION INFILTRATION; PERINEURAL at 09:08

## 2024-01-17 RX ADMIN — SODIUM CHLORIDE, POTASSIUM CHLORIDE, SODIUM LACTATE AND CALCIUM CHLORIDE 9 ML/HR: 600; 310; 30; 20 INJECTION, SOLUTION INTRAVENOUS at 07:59

## 2024-01-17 NOTE — ANESTHESIA PREPROCEDURE EVALUATION
Anesthesia Evaluation     Patient summary reviewed and Nursing notes reviewed   no history of anesthetic complications:   NPO Solid Status: > 8 hours  NPO Liquid Status: > 8 hours           Airway   Mallampati: II  TM distance: >3 FB  Neck ROM: full  No difficulty expected  Dental          Pulmonary - negative pulmonary ROS    breath sounds clear to auscultation  Cardiovascular   Exercise tolerance: good (4-7 METS)    ECG reviewed  PT is on anticoagulation therapy  Rhythm: regular  Rate: normal    (+) hypertension well controlled less than 2 medications, dysrhythmias (Left Bundle Branch Block,   Status post SVT ablation in 2009, and again in 2011 by Dr. Yan at [facility] hyperlipidemia    ROS comment: Last ASA dose 1/8/24    EKG:    ECG 12 Lead     Date/Time: 10/30/2023 2:32 PM  Performed by: Cassie Callahan APRN     Authorized by: Cassie Callahan APRN  Comparison: compared with previous ECG   Similar to previous ECG  Comparison to previous ECG: LBBB      12/2021:  HEART RATE= 88  bpm  RR Interval= 684  ms  ME Interval= 169  ms  P Horizontal Axis= -14  deg  P Front Axis= 48  deg  QRSD Interval= 128  ms  QT Interval= 396  ms  QRS Axis= -36  deg  T Wave Axis= 95  deg  - ABNORMAL ECG -  Sinus rhythm  Probable left atrial enlargement  Left bundle branch block  NO PRIOR TRACING AVAILABLE FOR COMPARISON  Electronically Signed By: Deshawn Aranda ([facility]) 18-Dec-2021 11:56:23  Date and Time of Study: 2021-12-17 14:42:45      Neuro/Psych  (+) numbness  GI/Hepatic/Renal/Endo    (+) thyroid problem (Under Cassie Callahan's care for this.) hypothyroidism    Musculoskeletal     (+) back pain, chronic pain      ROS comment: Bulging of intervertebral disc between L4 and L5  Chronic bilateral low back pain with left-sided sciatica      Abdominal     Abdomen: soft.  Bowel sounds: normal.   Substance History   (+) alcohol use (1 - 2 glasses of wine per day)     OB/GYN      Comment: Vasomotor symptoms due to  menopause      Other - negative ROS                     Anesthesia Plan    ASA 3     MAC     intravenous induction     Anesthetic plan, risks, benefits, and alternatives have been provided, discussed and informed consent has been obtained with: patient.  Pre-procedure education provided  Use of blood products discussed with patient  Consented to blood products.    Plan discussed with CRNA.      CODE STATUS:

## 2024-01-17 NOTE — H&P
"Patient Care Team:  Cassie Callahan APRN as PCP - General (Family Medicine)    CHIEF COMPLAINT: Screening CRC    HISTORY OF PRESENT ILLNESS:  For average risk screening.   Last c/s in 12/2014 had no polyps.     Past Medical History:   Diagnosis Date    Abnormal ECG 2002    Discovered left bundle branch block    Allergic [id number]    Oral Prednisone    Cataract 2021    Cataract surgery on 01/13/21 & 01/27/21.    Chronic bilateral low back pain with left-sided sciatica 01/07/2020    Disease of thyroid gland     SUBCLINICAL HYPOTHYROIDISM    Fibroid [id number]    Uterine fibroid/s removed.    Hx of [place] spotted fever     Hyperlipidemia     Hypertension     Hypothyroidism     Under Cassie Callahan's care for this.    Inflammatory bowel disease 06/10/2021    \"mild\" IBD, taking Florastor    Left bundle branch block     Lumbosacral disc disease 08/22/2019    MRI -disclosed disc bulging at L4-L5    Lymphocytic colitis 06/12/2016    Osteopenia     Osteopenia     Supraventricular tachycardia     Status post ablation in 2009 and again in 2011    Varicella very young age     Past Surgical History:   Procedure Laterality Date    BREAST BIOPSY Left     benign    BREAST SURGERY      CARDIAC ABLATION  04/09/2009    Karthik Hernandez at [facility] x2    CARDIAC SURGERY  Apr 2009, Oct 2011    SVTs - Heart Catheter Ablations (x 2)    CATARACT EXTRACTION  01/2021    both eyes    COLONOSCOPY  December 2014    EPIDURAL Left 06/29/2021    Procedure: transforaminal epidural steroid injection left lumbar 4 and lumbar 5;  Surgeon: Hung Lemos MD;  Location: [facility] MA[facility] OR;  Service: Pain Management;  Laterality: Left;    EPIDURAL BLOCK  10/31/19, 12/03/19, 06/29/21    FRACTURE SURGERY      WRIST    MYOMECTOMY ABDOMINAL APPROACH  [id number]    SUBTOTAL HYSTERECTOMY  12/22/1998    Uterus + Cervix only; retained ovaries    TONSILLECTOMY      TOTAL ABDOMINAL HYSTERECTOMY  12/22/1998    UTERINE FIBROID SURGERY      WISDOM " "TOOTH EXTRACTION  very young age     Family History   Problem Relation Age of Onset    Heart disease Other     Hypertension Other     Cancer Other     Asthma Other     Kidney disease Other     Alcohol abuse Other     Arthritis Other     Glaucoma Other     Macular degeneration Other     Skin cancer Mother     Hypertension Mother     Atrial fibrillation Mother     Cancer Mother         Several bouts of skin cancer.    Vision loss Mother         diagnosed 06/22/2017 with DRY macular degeneration    Mental illness Mother         Dementia (diagnosed 2022)    Heart disease Father 79        Heart attack 12/24/2016 followed by quintuple bypass surgery    COPD Father         Histoplasmosis    Skin cancer Father     Nephrolithiasis Father         congenital unilateral kidney    Cancer Father         A few spots of skin cancer removed from his head.    Hearing loss Father         caused by swim diving incident    Kidney disease Father         CKD Stage 3a (born w/1 kidney)    Asthma Sister     Diabetes Sister         diagnosed in 2019    Arthritis Sister         Psoriatic    Glaucoma Maternal Grandmother     Macular degeneration Maternal Grandmother     Hypertension Maternal Grandmother         DOD 07/07/2014 (age 97)    Hypertension Paternal Grandmother     Glaucoma Paternal Grandmother     Cancer Maternal Uncle         Basal cell & squamous cell skin cancer    Hypertension Maternal Uncle     Stroke Maternal Uncle         2022 brain scan shows TIA \"at some point\" (unknown)    Vision loss Sister         diagnosed in Dec 2021 with glaucoma    Breast cancer Neg Hx     Colon cancer Neg Hx     Ovarian cancer Neg Hx     Uterine cancer Neg Hx      Social History     Tobacco Use    Smoking status: Never     Passive exposure: Never    Smokeless tobacco: Never   Vaping Use    Vaping Use: Never used   Substance Use Topics    Alcohol use: Yes     Alcohol/week: 5.0 - 7.0 standard drinks of alcohol     Types: 5 - 7 Glasses of wine per week " "   Drug use: No     Medications Prior to Admission   Medication Sig Dispense Refill Last Dose    atorvastatin (LIPITOR) 20 MG tablet TAKE 1 TABLET EVERY NIGHT 90 tablet 3 1/15/2024    hydroCHLOROthiazide (HYDRODIURIL) 25 MG tablet TAKE 1 TABLET DAILY 90 tablet 3 1/15/2024    levothyroxine (SYNTHROID, LEVOTHROID) 50 MCG tablet Take 1 tablet by mouth Daily. 30 tablet 0 1/17/2024    MAGNESIUM GLYCINATE PO Take  by mouth.   Past Week    Misc Natural Products (OSTEO BI-FLEX ADV TRIPLE ST) tablet Take  by mouth.   Past Week    Multiple Vitamins-Minerals (OCUVITE ADULT FORMULA PO) Take  by mouth.   Past Week    Omega-3 Fatty Acids (FISH OIL) 1000 MG capsule capsule Take 2 capsules by mouth Daily.   Past Week    PARoxetine (PAXIL) 10 MG tablet TAKE 1 TABLET EVERY MORNING 90 tablet 3 1/16/2024    saccharomyces boulardii (Florastor) 250 MG capsule Take 1 capsule by mouth 2 (Two) Times a Day. 60 capsule 0 Past Week    Ubiquinol 100 MG capsule    Past Week    aspirin 81 MG tablet Take  by mouth Daily.   1/8/2024     Allergies:  Prednisone    REVIEW OF SYSTEMS:  Please see the above history of present illness for pertinent positives and negatives.  The remainder of the patient's systems have been reviewed and are negative.     Vital Signs  Temp:  [98.2 °F (36.8 °C)] 98.2 °F (36.8 °C)  Heart Rate:  [73] 73  Resp:  [16] 16  BP: (127)/(70) 127/70    Flowsheet Rows      Flowsheet Row First Filed Value   Admission Height 157.5 cm (62\") Documented at 01/16/2024 1532   Admission Weight 57.2 kg (126 lb) Documented at 01/16/2024 1532             Physical Exam:  Physical Exam   Constitutional: Patient appears well-developed and well-nourished and in no acute distress   HEENT:   Head: Normocephalic and atraumatic.   Eyes:  Pupils are equal, round, and reactive to light. EOM are intact. Sclerae are anicteric and non-injected.  Mouth and Throat: Patient has moist mucous membranes. Oropharynx is clear of any erythema or exudate.     Neck: " Neck supple. No JVD present. No thyromegaly present. No lymphadenopathy present.  Cardiovascular: Regular rate, regular rhythm, S1 normal and S2 normal.  Exam reveals no gallop and no friction rub.  No murmur heard.  Pulmonary/Chest: Lungs are clear to auscultation bilaterally. No respiratory distress. No wheezes. No rhonchi. No rales.   Abdominal: Soft. Bowel sounds are normal. No distension and no mass. There is no hepatosplenomegaly. There is no tenderness.   Musculoskeletal: Normal Muscle tone  Extremities: No edema. Pulses are palpable in all 4 extremities.  Neurological: Patient is alert and oriented to person, place, and time. Cranial nerves II-XII are grossly intact with no focal deficits.  Skin: Skin is warm. No rash noted. Nails show no clubbing.  No cyanosis or erythema.    Debilities/Disabilities Identified: None  Emotional Behavior: Appropriate     Results Review:   I reviewed the patient's new clinical results.    Lab Results (most recent)       None            Imaging Results (Most Recent)       None          reviewed    ECG/EMG Results (most recent)       None          reviewed    Assessment & Plan   Screening CRC/  colonoscopy      I discussed the patient's findings and my recommendations with patient.     Mingo Polo MD  01/17/24  09:07 EST    Time: 10 min prior to procedure.

## 2024-01-17 NOTE — ANESTHESIA POSTPROCEDURE EVALUATION
Patient: Tierney Rolon    Procedure Summary       Date: 01/17/24 Room / Location: [facility] ENDOSCOPY 2 /  [facility] OR    Anesthesia Start: 0905 Anesthesia Stop: 0926    Procedure: COLONOSCOPY Diagnosis:       Encounter for screening for malignant neoplasm of colon      (Encounter for screening for malignant neoplasm of colon [Z12.11])    Surgeons: Mingo Polo MD Provider: Gatito Wiggins CRNA    Anesthesia Type: MAC ASA Status: 3            Anesthesia Type: MAC    Vitals  Vitals Value Taken Time   /68 01/17/24 0950   Temp     Pulse 86 01/17/24 0959   Resp 18 01/17/24 0935   SpO2 97 % 01/17/24 0959   Vitals shown include unfiled device data.        Post Anesthesia Care and Evaluation    Patient location during evaluation: PHASE II  Patient participation: complete - patient participated  Level of consciousness: awake  Pain management: adequate    Airway patency: patent  Anesthetic complications: No anesthetic complications  PONV Status: none  Cardiovascular status: acceptable  Respiratory status: acceptable  Hydration status: acceptable

## 2024-01-18 ENCOUNTER — OFFICE VISIT (OUTPATIENT)
Dept: INTERNAL MEDICINE | Facility: CLINIC | Age: 69
End: 2024-01-18
Payer: COMMERCIAL

## 2024-01-18 VITALS
HEART RATE: 80 BPM | HEIGHT: 62 IN | TEMPERATURE: 97.8 F | OXYGEN SATURATION: 98 % | SYSTOLIC BLOOD PRESSURE: 124 MMHG | WEIGHT: 130 LBS | DIASTOLIC BLOOD PRESSURE: 76 MMHG | BODY MASS INDEX: 23.92 KG/M2

## 2024-01-18 DIAGNOSIS — E03.9 ACQUIRED HYPOTHYROIDISM: ICD-10-CM

## 2024-01-18 DIAGNOSIS — I10 PRIMARY HYPERTENSION: Primary | ICD-10-CM

## 2024-01-18 DIAGNOSIS — E78.2 MIXED HYPERLIPIDEMIA: ICD-10-CM

## 2024-01-18 PROCEDURE — 99214 OFFICE O/P EST MOD 30 MIN: CPT | Performed by: NURSE PRACTITIONER

## 2024-01-18 RX ORDER — LEVOTHYROXINE SODIUM 0.05 MG/1
50 TABLET ORAL DAILY
Qty: 90 TABLET | Refills: 1 | Status: SHIPPED | OUTPATIENT
Start: 2024-01-18

## 2024-01-18 NOTE — PROGRESS NOTES
Subjective   Tierney Rolon is a 68 y.o. female presenting today for follow up of   Chief Complaint   Patient presents with    Hypertension     F/u    Hyperlipidemia     F/u       History of Present Illness     Outpatient Medications Marked as Taking for the 1/18/24 encounter (Office Visit) with Cassie Callahan APRN   Medication Sig Dispense Refill    aspirin 81 MG tablet Take  by mouth Daily.      atorvastatin (LIPITOR) 20 MG tablet TAKE 1 TABLET EVERY NIGHT 90 tablet 3    hydroCHLOROthiazide (HYDRODIURIL) 25 MG tablet TAKE 1 TABLET DAILY 90 tablet 3    levothyroxine (SYNTHROID, LEVOTHROID) 50 MCG tablet Take 1 tablet by mouth Daily. 90 tablet 1    MAGNESIUM GLYCINATE PO Take  by mouth.      Misc Natural Products (OSTEO BI-FLEX ADV TRIPLE ST) tablet Take  by mouth.      Multiple Vitamins-Minerals (OCUVITE ADULT FORMULA PO) Take  by mouth.      Omega-3 Fatty Acids (FISH OIL) 1000 MG capsule capsule Take 2 capsules by mouth Daily.      PARoxetine (PAXIL) 10 MG tablet TAKE 1 TABLET EVERY MORNING 90 tablet 3    saccharomyces boulardii (Florastor) 250 MG capsule Take 1 capsule by mouth 2 (Two) Times a Day. 60 capsule 0    Ubiquinol 100 MG capsule       [DISCONTINUED] levothyroxine (SYNTHROID, LEVOTHROID) 50 MCG tablet Take 1 tablet by mouth Daily. 30 tablet 0       HTN - tolerating medication regimen w/o c/o;does not self monitor; denies CP, SOB, HA, or CP     HLD - tolerating statin and ASA w/o c/o     Thyroid - taking 50mcg of Levothyroxine     Mood and post-menopausal vasomotor S&S - stable w/ Paroxitine      The following portions of the patient's history were reviewed and updated as appropriate: allergies, current medications, past family history, past medical history, past social history, past surgical history and problem list.        Objective   Vitals:    01/18/24 1242   BP: 124/76   BP Location: Left arm   Patient Position: Sitting   Cuff Size: Adult   Pulse: 80   Temp: 97.8 °F (36.6 °C)   TempSrc:  "Infrared   SpO2: 98%   Weight: 59 kg (130 lb)   Height: 157.5 cm (62\")       BP Readings from Last 3 Encounters:   01/18/24 124/76   01/17/24 124/68   10/30/23 128/78        Wt Readings from Last 3 Encounters:   01/18/24 59 kg (130 lb)   01/17/24 58.1 kg (128 lb)   10/30/23 60.1 kg (132 lb 9.6 oz)        Body mass index is 23.78 kg/m².  Nursing notes and vitals reviewed.    Physical Exam  Constitutional:       General: She is not in acute distress.     Appearance: She is well-developed.   Pulmonary:      Effort: Pulmonary effort is normal.   Neurological:      Mental Status: She is alert.   Psychiatric:         Attention and Perception: She is attentive.         Speech: Speech normal.         Recent Results (from the past 672 hour(s))   T4, Free    Collection Time: 01/09/24  9:18 AM    Specimen: Blood   Result Value Ref Range    Free T4 1.34 0.93 - 1.70 ng/dL   TSH    Collection Time: 01/09/24  9:18 AM    Specimen: Blood   Result Value Ref Range    TSH 2.220 0.270 - 4.200 uIU/mL   Lipid Panel With / Chol / HDL Ratio    Collection Time: 01/09/24  9:18 AM    Specimen: Blood   Result Value Ref Range    Total Cholesterol 201 (H) 0 - 200 mg/dL    Triglycerides 114 0 - 150 mg/dL    HDL Cholesterol 74 (H) 40 - 60 mg/dL    VLDL Cholesterol Trevor 20 5 - 40 mg/dL    LDL Chol Calc (NIH) 107 (H) 0 - 100 mg/dL    Chol/HDL Ratio 2.72    Comprehensive Metabolic Panel    Collection Time: 01/09/24  9:18 AM    Specimen: Blood   Result Value Ref Range    Glucose 91 65 - 99 mg/dL    BUN 15 8 - 23 mg/dL    Creatinine 0.85 0.57 - 1.00 mg/dL    EGFR Result 75.7 >60.0 mL/min/1.73    BUN/Creatinine Ratio 17.6 7.0 - 25.0    Sodium 140 136 - 145 mmol/L    Potassium 3.8 3.5 - 5.2 mmol/L    Chloride 99 98 - 107 mmol/L    Total CO2 28.6 22.0 - 29.0 mmol/L    Calcium 10.3 8.6 - 10.5 mg/dL    Total Protein 7.0 6.0 - 8.5 g/dL    Albumin 4.8 3.5 - 5.2 g/dL    Globulin 2.2 gm/dL    A/G Ratio 2.2 g/dL    Total Bilirubin 0.6 0.0 - 1.2 mg/dL    Alkaline " Phosphatase 91 39 - 117 U/L    AST (SGOT) 27 1 - 32 U/L    ALT (SGPT) 31 1 - 33 U/L         Assessment & Plan   Diagnoses and all orders for this visit:    1. Primary hypertension (Primary)    2. Mixed hyperlipidemia    3. Acquired hypothyroidism  -     levothyroxine (SYNTHROID, LEVOTHROID) 50 MCG tablet; Take 1 tablet by mouth Daily.  Dispense: 90 tablet; Refill: 1        Except as noted above, pt will continue current medications as noted in the medication list. I will continue to authorize refills as needed.        Medications, including side effects, were discussed with the patient. Patient verbalized understanding.  The plan of care was discussed. All questions were answered. Patient verbalized understanding.      Return in about 6 months (around 7/18/2024) for Medicare Wellness, fasting labs one week prior to.

## 2024-03-29 ENCOUNTER — APPOINTMENT (OUTPATIENT)
Dept: BONE DENSITY | Facility: HOSPITAL | Age: 69
End: 2024-03-29
Payer: COMMERCIAL

## 2024-03-29 ENCOUNTER — HOSPITAL ENCOUNTER (OUTPATIENT)
Dept: MAMMOGRAPHY | Facility: HOSPITAL | Age: 69
Discharge: HOME | End: 2024-03-29
Payer: COMMERCIAL

## 2024-03-29 DIAGNOSIS — Z12.31 ENCOUNTER FOR SCREENING MAMMOGRAM FOR MALIGNANT NEOPLASM OF BREAST: ICD-10-CM

## 2024-03-29 DIAGNOSIS — Z78.0 POSTMENOPAUSE: ICD-10-CM

## 2024-03-29 PROCEDURE — 77067 SCR MAMMO BI INCL CAD: CPT

## 2024-03-29 PROCEDURE — 77063 BREAST TOMOSYNTHESIS BI: CPT

## 2024-03-29 PROCEDURE — 77080 DXA BONE DENSITY AXIAL: CPT

## 2024-04-25 RX ORDER — HYDROCHLOROTHIAZIDE 25 MG/1
TABLET ORAL
Qty: 90 TABLET | Refills: 3 | Status: SHIPPED | OUTPATIENT
Start: 2024-04-25

## 2024-04-28 ENCOUNTER — HOSPITAL ENCOUNTER (EMERGENCY)
Facility: HOSPITAL | Age: 69
Discharge: HOME | End: 2024-04-28
Attending: EMERGENCY MEDICINE | Admitting: EMERGENCY MEDICINE
Payer: COMMERCIAL

## 2024-04-28 ENCOUNTER — APPOINTMENT (OUTPATIENT)
Dept: CT IMAGING | Facility: HOSPITAL | Age: 69
End: 2024-04-28
Payer: COMMERCIAL

## 2024-04-28 VITALS
SYSTOLIC BLOOD PRESSURE: 166 MMHG | HEART RATE: 89 BPM | RESPIRATION RATE: 18 BRPM | WEIGHT: 118 LBS | TEMPERATURE: 98.6 F | DIASTOLIC BLOOD PRESSURE: 85 MMHG | OXYGEN SATURATION: 100 % | HEIGHT: 62 IN | BODY MASS INDEX: 21.71 KG/M2

## 2024-04-28 DIAGNOSIS — M54.50 LUMBAR BACK PAIN: Primary | ICD-10-CM

## 2024-04-28 PROCEDURE — 99284 EMERGENCY DEPT VISIT MOD MDM: CPT

## 2024-04-28 PROCEDURE — 72131 CT LUMBAR SPINE W/O DYE: CPT

## 2024-04-28 RX ORDER — HYDROCODONE BITARTRATE AND ACETAMINOPHEN 5; 325 MG/1; MG/1
1 TABLET ORAL ONCE
Status: DISCONTINUED | OUTPATIENT
Start: 2024-04-28 | End: 2024-04-28 | Stop reason: HOSPADM

## 2024-04-28 RX ORDER — IBUPROFEN 400 MG/1
TABLET ORAL
Status: COMPLETED
Start: 2024-04-28 | End: 2024-04-28

## 2024-04-28 RX ORDER — IBUPROFEN 400 MG/1
800 TABLET ORAL ONCE
Status: COMPLETED | OUTPATIENT
Start: 2024-04-28 | End: 2024-04-28

## 2024-04-28 RX ORDER — HYDROCODONE BITARTRATE AND ACETAMINOPHEN 5; 325 MG/1; MG/1
1 TABLET ORAL EVERY 8 HOURS PRN
Qty: 12 TABLET | Refills: 0 | Status: SHIPPED | OUTPATIENT
Start: 2024-04-28

## 2024-04-28 RX ADMIN — IBUPROFEN 800 MG: 400 TABLET, FILM COATED ORAL at 15:16

## 2024-04-28 RX ADMIN — IBUPROFEN 800 MG: 400 TABLET ORAL at 15:16

## 2024-04-28 NOTE — ED PROVIDER NOTES
Subjective   History of Present Illness  68-year-old female presents emergency room complaint of back pain.  Patient states she was leaning over getting close out of her drawer this morning around 10 AM when she felt a pop in her lower back with sudden onset of lumbar back pain.  Patient states she took over-the-counter medication which was not helpful.  Patient states she is having no radiculopathy nor difficulty with strength or utilizing her lower extremities however she does have pain in her lumbar back when she tries to walk or with certain movements or positions.  Patient states she has a past medical history of bulging disks in her lumbar spine but this feels different and she never had sudden onset of pain like this.      Review of Systems   Constitutional:  Negative for activity change, appetite change, chills, diaphoresis, fatigue and fever.   HENT:  Negative for congestion, rhinorrhea and sore throat.    Eyes:  Negative for photophobia and visual disturbance.   Respiratory:  Negative for cough and shortness of breath.    Cardiovascular:  Negative for chest pain, palpitations and leg swelling.   Gastrointestinal:  Negative for abdominal distention, abdominal pain, diarrhea, nausea and vomiting.   Genitourinary:  Negative for dysuria and flank pain.   Musculoskeletal:  Positive for back pain. Negative for arthralgias.   Skin:  Negative for rash.   Neurological:  Negative for dizziness, weakness and headaches.   Psychiatric/Behavioral:  Negative for agitation and behavioral problems.        Past Medical History:   Diagnosis Date    Abnormal ECG 2002    Discovered left bundle branch block    Allergic [id number]    Oral Prednisone    Cataract 2021    Cataract surgery on 01/13/21 & 01/27/21.    Chronic bilateral low back pain with left-sided sciatica 01/07/2020    Disease of thyroid gland     SUBCLINICAL HYPOTHYROIDISM    Fibroid [id number]    Uterine fibroid/s removed.    Hx of [place] spotted fever      "Hyperlipidemia     Hypertension     Hypothyroidism     Under Cassie Callahan's care for this.    Inflammatory bowel disease 06/10/2021    \"mild\" IBD, taking Florastor    Left bundle branch block     Lumbosacral disc disease 08/22/2019    MRI -disclosed disc bulging at L4-L5    Lymphocytic colitis 06/12/2016    Osteopenia     Osteopenia     Supraventricular tachycardia     Status post ablation in 2009 and again in 2011    Varicella very young age       Allergies   Allergen Reactions    Prednisone Other (See Comments)     Oral prednisone caused joint pain       Past Surgical History:   Procedure Laterality Date    BREAST BIOPSY Left     benign    BREAST SURGERY      CARDIAC ABLATION  04/09/2009    Karthik Hernandez at [facility] x2    CARDIAC SURGERY  Apr 2009, Oct 2011    SVTs - Heart Catheter Ablations (x 2)    CATARACT EXTRACTION  01/2021    both eyes    COLONOSCOPY  December 2014    COLONOSCOPY N/A 1/17/2024    Procedure: COLONOSCOPY;  Surgeon: Mingo Polo MD;  Location: [facility] OR;  Service: Gastroenterology;  Laterality: N/A;  diverticulosis, external hemorrhoids    EPIDURAL Left 06/29/2021    Procedure: transforaminal epidural steroid injection left lumbar 4 and lumbar 5;  Surgeon: Hung Lemos MD;  Location: [facility] MAIN OR;  Service: Pain Management;  Laterality: Left;    EPIDURAL BLOCK  10/31/19, 12/03/19, 06/29/21    FRACTURE SURGERY      WRIST    MYOMECTOMY ABDOMINAL APPROACH  [id number]    SUBTOTAL HYSTERECTOMY  12/22/1998    Uterus + Cervix only; retained ovaries    TONSILLECTOMY      TOTAL ABDOMINAL HYSTERECTOMY  12/22/1998    UTERINE FIBROID SURGERY      WISDOM TOOTH EXTRACTION  very young age       Family History   Problem Relation Age of Onset    Heart disease Other     Hypertension Other     Cancer Other     Asthma Other     Kidney disease Other     Alcohol abuse Other     Arthritis Other     Glaucoma Other     Macular degeneration Other     Skin cancer Mother     Hypertension Mother     " "Atrial fibrillation Mother     Cancer Mother         Several bouts of skin cancer.    Vision loss Mother         diagnosed 06/22/2017 with DRY macular degeneration    Mental illness Mother         Dementia (diagnosed 11/2022)    Heart disease Father 79        Heart attack 12/24/2016 followed by quintuple bypass surgery    COPD Father         Histoplasmosis    Skin cancer Father     Nephrolithiasis Father         congenital unilateral kidney    Cancer Father         A few spots of skin cancer removed from his head.    Hearing loss Father         caused by swim diving incident    Kidney disease Father         CKD Stage 3a (born w/1 kidney)    Asthma Sister     Diabetes Sister         diagnosed in 2019    Arthritis Sister         Psoriatic    Glaucoma Maternal Grandmother     Macular degeneration Maternal Grandmother     Hypertension Maternal Grandmother         DOD 07/07/2014 (age 97)    Hypertension Paternal Grandmother     Glaucoma Paternal Grandmother     Cancer Maternal Uncle         Basal cell & squamous cell skin cancer    Hypertension Maternal Uncle     Stroke Maternal Uncle         2022 brain scan shows TIA \"at some point\" (unknown)    Vision loss Sister         diagnosed in Dec 2021 with glaucoma    Breast cancer Neg Hx     Colon cancer Neg Hx     Ovarian cancer Neg Hx     Uterine cancer Neg Hx        Social History     Socioeconomic History    Marital status:    Tobacco Use    Smoking status: Never     Passive exposure: Never    Smokeless tobacco: Never   Vaping Use    Vaping status: Never Used   Substance and Sexual Activity    Alcohol use: Yes     Alcohol/week: 5.0 - 7.0 standard drinks of alcohol     Types: 5 - 7 Glasses of wine per week    Drug use: No    Sexual activity: Not Currently     Partners: Male     Birth control/protection: Other, Post-menopausal, Hysterectomy     Comment: Had partial hysterectomy on 12/22/1998.           Objective   Physical Exam  Vitals and nursing note reviewed. "   Constitutional:       General: She is not in acute distress.     Appearance: Normal appearance. She is not ill-appearing, toxic-appearing or diaphoretic.      Comments: 68-year-old female in no acute distress.  Patient is mildly anxious   HENT:      Head: Normocephalic and atraumatic.      Nose: Nose normal.      Mouth/Throat:      Mouth: Mucous membranes are moist.   Eyes:      Conjunctiva/sclera: Conjunctivae normal.   Cardiovascular:      Rate and Rhythm: Normal rate and regular rhythm.      Pulses: Normal pulses.   Pulmonary:      Effort: Pulmonary effort is normal.      Breath sounds: Normal breath sounds.   Abdominal:      General: Abdomen is flat. There is no distension.      Tenderness: There is no abdominal tenderness.   Musculoskeletal:         General: No swelling. Normal range of motion.      Cervical back: Normal range of motion and neck supple.      Lumbar back: Spasms, tenderness and bony tenderness present. No swelling, edema, deformity, signs of trauma or lacerations. Decreased range of motion. Negative right straight leg raise test and negative left straight leg raise test.        Legs:       Comments: Patient has tenderness to palpation in the area seen on diagram.  Patient has both paraspinal and reported spinal tenderness to palpation.    Patient's straight leg raises bilaterally are normal and patient is neurovascular intact in her bilateral lower extremities.   Skin:     General: Skin is warm and dry.   Neurological:      General: No focal deficit present.      Mental Status: She is alert and oriented to person, place, and time.   Psychiatric:         Mood and Affect: Mood normal.         Procedures           ED Course  ED Course as of 04/28/24 1528   Sun Apr 28, 2024   1505 Patient is now stating that she was dropped off by a neighbor but she has to drive home so she cannot have narcotic pain medicine.  We will give patient 800 mg of Motrin [BH]   1526 CT lumbar spine without  contrast:  IMPRESSION:  Impression:  1. Multilevel degenerative disc disease with mild spinal canal stenosis at L3-L4 and L4-L5 secondary to disc bulges.  2. Mild levocurvature of the lumbar spine centered at the L2-L3 level with asymmetric right-sided disc height loss.           Electronically Signed: Arnie James    4/28/2024 3:16 PM EDT    Workstation ID: [id number]   []   1526 Spoke with patient concerning radiologic findings specifically no acute abnormality noted.  Will treat patient for musculoskeletal lumbar back pain and follow-up with her primary care physician as needed and/or can return the emergency room for new persistent worsening symptoms.  Patient states she understands agrees with plan and all questions answered to her satisfaction. []      ED Course User Index  [] Alfred Rivera MD                                             Medical Decision Making  My differential diagnosis for back pain includes but is not limited to:  Musculoskeletal strain, contusion, retroperitoneal hematoma, disc protrusion, vertebral fracture, transverse process fracture, rib fracture, facet syndrome, sacroiliac joint strain, sciatica, renal injury, splenic injury, pancreatic injury, osteoarthritis, lumbar spondylosis, spinal stenosis, ankylosing spondylitis, sacroiliac joint inflammation, pancreatitis, perforated peptic ulcer, diverticulitis, endometriosis, chronic PID, epidural abscess, osteomyelitis, retroperitoneal abscess, pyelonephritis, pneumonia, subphrenic abscess, tuberculosis, neurofibroma, meningioma, multiple myeloma, lymphoma, metastatic cancer, primary cancer, AAA, aortic dissection, spinal ischemia, referred pain, ureterolithiasis     Amount and/or Complexity of Data Reviewed  Radiology: ordered. Decision-making details documented in ED Course.    Risk  Prescription drug management.        Final diagnoses:   Lumbar back pain       ED Disposition  ED Disposition       ED Disposition   Discharge     Condition   Stable    Comment   --               Cassie Callahan, APRN  [address]  [address]  [place] [address]  [phone]    Schedule an appointment as soon as possible for a visit       [facility] EMERGENCY DEPARTMENT  [address]  [place] [address] [phone]  Go to   As needed         Medication List        New Prescriptions      HYDROcodone-acetaminophen 5-325 MG per tablet  Commonly known as: NORCO  Take 1 tablet by mouth Every 8 (Eight) Hours As Needed for Moderate Pain.               Where to Get Your Medications        These medications were sent to [facility] [id number] - [place], [place] - [address] - [phone]  - [phone]  [address], [facility] [address]      Phone: [phone]   HYDROcodone-acetaminophen 5-325 MG per tablet            Alfred Rivera MD  04/28/24 8762

## 2024-05-02 DIAGNOSIS — Z78.0 MENOPAUSE: ICD-10-CM

## 2024-05-02 RX ORDER — PAROXETINE 10 MG/1
TABLET, FILM COATED ORAL
Qty: 90 TABLET | Refills: 3 | Status: SHIPPED | OUTPATIENT
Start: 2024-05-02

## 2024-07-30 DIAGNOSIS — E03.9 ACQUIRED HYPOTHYROIDISM: ICD-10-CM

## 2024-07-30 RX ORDER — LEVOTHYROXINE SODIUM 0.05 MG/1
50 TABLET ORAL DAILY
Qty: 90 TABLET | Refills: 1 | Status: CANCELLED | OUTPATIENT
Start: 2024-07-30

## 2024-07-30 RX ORDER — LEVOTHYROXINE SODIUM 0.05 MG/1
50 TABLET ORAL DAILY
Qty: 90 TABLET | Refills: 0 | Status: SHIPPED | OUTPATIENT
Start: 2024-07-30

## 2024-10-28 DIAGNOSIS — E78.2 MIXED HYPERLIPIDEMIA: ICD-10-CM

## 2024-10-28 RX ORDER — ATORVASTATIN CALCIUM 20 MG/1
TABLET, FILM COATED ORAL
Qty: 90 TABLET | Refills: 3 | OUTPATIENT
Start: 2024-10-28

## (undated) DEVICE — ADAPT CLN BIOGUARD AIR/H2O DISP

## (undated) DEVICE — EPIDURAL TRAY: Brand: MEDLINE INDUSTRIES, INC.

## (undated) DEVICE — GLV SURG TRIUMPH PF LTX 7.5 STRL

## (undated) DEVICE — Device: Brand: PORTEX

## (undated) DEVICE — SOL IRR H2O BTL 1000ML STRL

## (undated) DEVICE — LINER SURG CANSTR SXN S/RIGD 1500CC

## (undated) DEVICE — NDL SPINE 22G 31/2IN BLK

## (undated) DEVICE — KT ORCA ORCAPOD DISP STRL

## (undated) DEVICE — Device

## (undated) DEVICE — SYR LL W/SCALE/MARK 3ML STRL

## (undated) DEVICE — BW-412T DISP COMBO CLEANING BRUSH: Brand: SINGLE USE COMBINATION CLEANING BRUSH